# Patient Record
Sex: FEMALE | Race: WHITE | Employment: OTHER | URBAN - METROPOLITAN AREA
[De-identification: names, ages, dates, MRNs, and addresses within clinical notes are randomized per-mention and may not be internally consistent; named-entity substitution may affect disease eponyms.]

---

## 2017-01-05 ENCOUNTER — OFFICE VISIT (OUTPATIENT)
Dept: ORTHOPEDIC SURGERY | Age: 77
End: 2017-01-05

## 2017-01-05 VITALS — BODY MASS INDEX: 21.67 KG/M2 | HEIGHT: 65 IN | WEIGHT: 130.07 LBS

## 2017-01-05 DIAGNOSIS — S52.531A CLOSED COLLES' FRACTURE OF RIGHT RADIUS, INITIAL ENCOUNTER: Primary | ICD-10-CM

## 2017-01-05 DIAGNOSIS — S52.532A CLOSED COLLES' FRACTURE OF LEFT RADIUS, INITIAL ENCOUNTER: ICD-10-CM

## 2017-01-05 PROCEDURE — 73110 X-RAY EXAM OF WRIST: CPT | Performed by: ORTHOPAEDIC SURGERY

## 2017-01-05 PROCEDURE — L3908 WHO COCK-UP NONMOLDE PRE OTS: HCPCS | Performed by: ORTHOPAEDIC SURGERY

## 2017-01-05 PROCEDURE — 99024 POSTOP FOLLOW-UP VISIT: CPT | Performed by: ORTHOPAEDIC SURGERY

## 2017-01-11 ENCOUNTER — TELEPHONE (OUTPATIENT)
Dept: INTERNAL MEDICINE | Age: 77
End: 2017-01-11

## 2017-01-11 RX ORDER — OXYCODONE AND ACETAMINOPHEN 10; 325 MG/1; MG/1
1 TABLET ORAL EVERY 6 HOURS PRN
Qty: 120 TABLET | Refills: 0 | Status: SHIPPED | OUTPATIENT
Start: 2017-01-11 | End: 2017-02-09 | Stop reason: SDUPTHER

## 2017-01-23 ENCOUNTER — OFFICE VISIT (OUTPATIENT)
Dept: ORTHOPEDIC SURGERY | Age: 77
End: 2017-01-23

## 2017-01-23 VITALS — BODY MASS INDEX: 21.67 KG/M2 | WEIGHT: 130.07 LBS | HEIGHT: 65 IN

## 2017-01-23 DIAGNOSIS — S52.532A CLOSED COLLES' FRACTURE OF LEFT RADIUS, INITIAL ENCOUNTER: ICD-10-CM

## 2017-01-23 DIAGNOSIS — S52.531A CLOSED COLLES' FRACTURE OF RIGHT RADIUS, INITIAL ENCOUNTER: Primary | ICD-10-CM

## 2017-01-23 PROCEDURE — 73110 X-RAY EXAM OF WRIST: CPT | Performed by: ORTHOPAEDIC SURGERY

## 2017-01-23 PROCEDURE — 99024 POSTOP FOLLOW-UP VISIT: CPT | Performed by: ORTHOPAEDIC SURGERY

## 2017-01-23 PROCEDURE — L3908 WHO COCK-UP NONMOLDE PRE OTS: HCPCS | Performed by: ORTHOPAEDIC SURGERY

## 2017-01-27 ENCOUNTER — HOSPITAL ENCOUNTER (OUTPATIENT)
Dept: OCCUPATIONAL THERAPY | Age: 77
Discharge: HOME | End: 2017-01-31
Admitting: ORTHOPAEDIC SURGERY

## 2017-02-03 ENCOUNTER — HOSPITAL ENCOUNTER (OUTPATIENT)
Dept: OCCUPATIONAL THERAPY | Age: 77
Discharge: HOME | End: 2017-02-03
Admitting: ORTHOPAEDIC SURGERY

## 2017-02-09 ENCOUNTER — TELEPHONE (OUTPATIENT)
Dept: INTERNAL MEDICINE | Age: 77
End: 2017-02-09

## 2017-02-09 RX ORDER — OXYCODONE AND ACETAMINOPHEN 10; 325 MG/1; MG/1
1 TABLET ORAL EVERY 6 HOURS PRN
Qty: 120 TABLET | Refills: 0 | Status: SHIPPED | OUTPATIENT
Start: 2017-02-09 | End: 2017-04-03 | Stop reason: SDUPTHER

## 2017-02-13 ENCOUNTER — OFFICE VISIT (OUTPATIENT)
Dept: ORTHOPEDIC SURGERY | Age: 77
End: 2017-02-13

## 2017-02-13 ENCOUNTER — TELEPHONE (OUTPATIENT)
Dept: INTERNAL MEDICINE | Age: 77
End: 2017-02-13

## 2017-02-13 DIAGNOSIS — F32.A ANXIETY AND DEPRESSION: ICD-10-CM

## 2017-02-13 DIAGNOSIS — S52.532A CLOSED COLLES' FRACTURE OF LEFT RADIUS, INITIAL ENCOUNTER: ICD-10-CM

## 2017-02-13 DIAGNOSIS — S52.531A CLOSED COLLES' FRACTURE OF RIGHT RADIUS, INITIAL ENCOUNTER: Primary | ICD-10-CM

## 2017-02-13 DIAGNOSIS — F41.9 ANXIETY AND DEPRESSION: ICD-10-CM

## 2017-02-13 PROCEDURE — 73110 X-RAY EXAM OF WRIST: CPT | Performed by: ORTHOPAEDIC SURGERY

## 2017-02-13 PROCEDURE — 99024 POSTOP FOLLOW-UP VISIT: CPT | Performed by: ORTHOPAEDIC SURGERY

## 2017-02-13 RX ORDER — ALPRAZOLAM 0.5 MG/1
TABLET ORAL
Qty: 90 TABLET | Refills: 0 | Status: SHIPPED | OUTPATIENT
Start: 2017-02-13 | End: 2017-04-14 | Stop reason: SDUPTHER

## 2017-03-13 ENCOUNTER — OFFICE VISIT (OUTPATIENT)
Dept: ORTHOPEDIC SURGERY | Age: 77
End: 2017-03-13

## 2017-03-13 VITALS
DIASTOLIC BLOOD PRESSURE: 68 MMHG | WEIGHT: 130.07 LBS | HEIGHT: 65 IN | BODY MASS INDEX: 21.67 KG/M2 | SYSTOLIC BLOOD PRESSURE: 145 MMHG | HEART RATE: 71 BPM

## 2017-03-13 DIAGNOSIS — S52.531A CLOSED COLLES' FRACTURE OF RIGHT RADIUS, INITIAL ENCOUNTER: Primary | ICD-10-CM

## 2017-03-13 DIAGNOSIS — S52.532A CLOSED COLLES' FRACTURE OF LEFT RADIUS, INITIAL ENCOUNTER: ICD-10-CM

## 2017-03-13 DIAGNOSIS — G56.01 CARPAL TUNNEL SYNDROME ON RIGHT: ICD-10-CM

## 2017-03-13 PROCEDURE — 73110 X-RAY EXAM OF WRIST: CPT | Performed by: ORTHOPAEDIC SURGERY

## 2017-03-13 PROCEDURE — 99024 POSTOP FOLLOW-UP VISIT: CPT | Performed by: ORTHOPAEDIC SURGERY

## 2017-03-13 PROCEDURE — 20526 THER INJECTION CARP TUNNEL: CPT | Performed by: ORTHOPAEDIC SURGERY

## 2017-03-22 ENCOUNTER — TELEPHONE (OUTPATIENT)
Dept: INTERNAL MEDICINE | Age: 77
End: 2017-03-22

## 2017-03-22 RX ORDER — ALPRAZOLAM 0.5 MG/1
0.5 TABLET ORAL 3 TIMES DAILY PRN
Qty: 21 TABLET | Refills: 0 | Status: SHIPPED | OUTPATIENT
Start: 2017-03-22 | End: 2017-03-29

## 2017-03-22 RX ORDER — OXYCODONE AND ACETAMINOPHEN 10; 325 MG/1; MG/1
1 TABLET ORAL EVERY 6 HOURS PRN
Qty: 28 TABLET | Refills: 0 | Status: SHIPPED | OUTPATIENT
Start: 2017-03-22 | End: 2017-03-29

## 2017-04-03 ENCOUNTER — OFFICE VISIT (OUTPATIENT)
Dept: INTERNAL MEDICINE | Age: 77
End: 2017-04-03

## 2017-04-03 VITALS
BODY MASS INDEX: 21.66 KG/M2 | HEIGHT: 65 IN | HEART RATE: 72 BPM | SYSTOLIC BLOOD PRESSURE: 148 MMHG | DIASTOLIC BLOOD PRESSURE: 80 MMHG | WEIGHT: 130 LBS

## 2017-04-03 DIAGNOSIS — E78.5 HYPERLIPIDEMIA, UNSPECIFIED HYPERLIPIDEMIA TYPE: ICD-10-CM

## 2017-04-03 DIAGNOSIS — Z00.00 PREVENTATIVE HEALTH CARE: ICD-10-CM

## 2017-04-03 DIAGNOSIS — F41.9 ANXIETY AND DEPRESSION: ICD-10-CM

## 2017-04-03 DIAGNOSIS — G89.29 CHRONIC BILATERAL LOW BACK PAIN WITHOUT SCIATICA: ICD-10-CM

## 2017-04-03 DIAGNOSIS — S52.532D CLOSED COLLES' FRACTURE OF LEFT RADIUS WITH ROUTINE HEALING, SUBSEQUENT ENCOUNTER: ICD-10-CM

## 2017-04-03 DIAGNOSIS — G89.29 CHRONIC RIGHT FLANK PAIN: ICD-10-CM

## 2017-04-03 DIAGNOSIS — I10 ESSENTIAL HYPERTENSION: ICD-10-CM

## 2017-04-03 DIAGNOSIS — F10.10 ALCOHOL ABUSE: ICD-10-CM

## 2017-04-03 DIAGNOSIS — F10.10 ALCOHOL ABUSE, CONTINUOUS: ICD-10-CM

## 2017-04-03 DIAGNOSIS — Z00.00 PREVENTATIVE HEALTH CARE: Primary | ICD-10-CM

## 2017-04-03 DIAGNOSIS — M54.50 CHRONIC BILATERAL LOW BACK PAIN WITHOUT SCIATICA: ICD-10-CM

## 2017-04-03 DIAGNOSIS — N39.0 SEPSIS SECONDARY TO UTI (HCC): ICD-10-CM

## 2017-04-03 DIAGNOSIS — F32.A ANXIETY AND DEPRESSION: ICD-10-CM

## 2017-04-03 DIAGNOSIS — Z87.891 FORMER CIGARETTE SMOKER: ICD-10-CM

## 2017-04-03 DIAGNOSIS — S52.531D CLOSED COLLES' FRACTURE OF RIGHT RADIUS WITH ROUTINE HEALING, SUBSEQUENT ENCOUNTER: ICD-10-CM

## 2017-04-03 DIAGNOSIS — A41.9 SEPSIS SECONDARY TO UTI (HCC): ICD-10-CM

## 2017-04-03 DIAGNOSIS — R10.9 CHRONIC RIGHT FLANK PAIN: ICD-10-CM

## 2017-04-03 DIAGNOSIS — J32.9 SINUSITIS, UNSPECIFIED CHRONICITY, UNSPECIFIED LOCATION: ICD-10-CM

## 2017-04-03 LAB
BASOPHILS ABSOLUTE: 0.1 K/UL (ref 0–0.2)
BASOPHILS RELATIVE PERCENT: 1 %
BILIRUBIN URINE: NEGATIVE
BLOOD, URINE: NEGATIVE
CLARITY: CLEAR
COLOR: YELLOW
EOSINOPHILS ABSOLUTE: 0 K/UL (ref 0–0.6)
EOSINOPHILS RELATIVE PERCENT: 0.6 %
GLUCOSE URINE: NEGATIVE MG/DL
HCT VFR BLD CALC: 46.7 % (ref 36–48)
HEMOGLOBIN: 15.3 G/DL (ref 12–16)
KETONES, URINE: NEGATIVE MG/DL
LEUKOCYTE ESTERASE, URINE: NEGATIVE
LYMPHOCYTES ABSOLUTE: 1.1 K/UL (ref 1–5.1)
LYMPHOCYTES RELATIVE PERCENT: 12.9 %
MCH RBC QN AUTO: 31.8 PG (ref 26–34)
MCHC RBC AUTO-ENTMCNC: 32.7 G/DL (ref 31–36)
MCV RBC AUTO: 97.1 FL (ref 80–100)
MICROSCOPIC EXAMINATION: NORMAL
MONOCYTES ABSOLUTE: 0.6 K/UL (ref 0–1.3)
MONOCYTES RELATIVE PERCENT: 7.6 %
NEUTROPHILS ABSOLUTE: 6.6 K/UL (ref 1.7–7.7)
NEUTROPHILS RELATIVE PERCENT: 77.9 %
NITRITE, URINE: NEGATIVE
PDW BLD-RTO: 15.5 % (ref 12.4–15.4)
PH UA: 5.5
PLATELET # BLD: 246 K/UL (ref 135–450)
PMV BLD AUTO: 9 FL (ref 5–10.5)
PROTEIN UA: NEGATIVE MG/DL
RBC # BLD: 4.81 M/UL (ref 4–5.2)
SPECIFIC GRAVITY UA: 1.02
URINE TYPE: NORMAL
UROBILINOGEN, URINE: 0.2 E.U./DL
WBC # BLD: 8.5 K/UL (ref 4–11)

## 2017-04-03 PROCEDURE — G0439 PPPS, SUBSEQ VISIT: HCPCS | Performed by: INTERNAL MEDICINE

## 2017-04-03 PROCEDURE — 93000 ELECTROCARDIOGRAM COMPLETE: CPT | Performed by: INTERNAL MEDICINE

## 2017-04-03 RX ORDER — OXYCODONE AND ACETAMINOPHEN 10; 325 MG/1; MG/1
1 TABLET ORAL EVERY 6 HOURS PRN
Qty: 120 TABLET | Refills: 0 | Status: SHIPPED | OUTPATIENT
Start: 2017-04-03 | End: 2017-05-10 | Stop reason: SDUPTHER

## 2017-04-03 RX ORDER — AZITHROMYCIN 500 MG/1
TABLET, FILM COATED ORAL
Qty: 3 TABLET | Refills: 0 | Status: SHIPPED | OUTPATIENT
Start: 2017-04-03 | End: 2017-04-13

## 2017-04-03 ASSESSMENT — ENCOUNTER SYMPTOMS
RESPIRATORY NEGATIVE: 1
EYES NEGATIVE: 1
GASTROINTESTINAL NEGATIVE: 1
BACK PAIN: 1
SINUS PRESSURE: 1

## 2017-04-04 LAB
A/G RATIO: 1.3 (ref 1.1–2.2)
ALBUMIN SERPL-MCNC: 4.5 G/DL (ref 3.4–5)
ALP BLD-CCNC: 86 U/L (ref 40–129)
ALT SERPL-CCNC: 10 U/L (ref 10–40)
AMPHETAMINE SCREEN, URINE: ABNORMAL
ANION GAP SERPL CALCULATED.3IONS-SCNC: 20 MMOL/L (ref 3–16)
AST SERPL-CCNC: 14 U/L (ref 15–37)
BARBITURATE SCREEN URINE: ABNORMAL
BENZODIAZEPINE SCREEN, URINE: POSITIVE
BILIRUB SERPL-MCNC: 0.4 MG/DL (ref 0–1)
BUN BLDV-MCNC: 30 MG/DL (ref 7–20)
CALCIUM SERPL-MCNC: 9.9 MG/DL (ref 8.3–10.6)
CANNABINOID SCREEN URINE: ABNORMAL
CHLORIDE BLD-SCNC: 94 MMOL/L (ref 99–110)
CHOLESTEROL, TOTAL: 323 MG/DL (ref 0–199)
CO2: 21 MMOL/L (ref 21–32)
COCAINE METABOLITE SCREEN URINE: ABNORMAL
CREAT SERPL-MCNC: 1.1 MG/DL (ref 0.6–1.2)
GFR AFRICAN AMERICAN: 59
GFR NON-AFRICAN AMERICAN: 48
GLOBULIN: 3.4 G/DL
GLUCOSE BLD-MCNC: 90 MG/DL (ref 70–99)
HDLC SERPL-MCNC: 120 MG/DL (ref 40–60)
LDL CHOLESTEROL CALCULATED: ABNORMAL MG/DL
LDL CHOLESTEROL DIRECT: 155 MG/DL
Lab: ABNORMAL
METHADONE SCREEN, URINE: ABNORMAL
OPIATE SCREEN URINE: ABNORMAL
OXYCODONE URINE: POSITIVE
PH UA: 5.5
PHENCYCLIDINE SCREEN URINE: ABNORMAL
POTASSIUM SERPL-SCNC: 4.9 MMOL/L (ref 3.5–5.1)
PROPOXYPHENE SCREEN: ABNORMAL
SODIUM BLD-SCNC: 135 MMOL/L (ref 136–145)
TOTAL PROTEIN: 7.9 G/DL (ref 6.4–8.2)
TRIGL SERPL-MCNC: 364 MG/DL (ref 0–150)
TSH REFLEX FT4: 1.11 UIU/ML (ref 0.27–4.2)
VLDLC SERPL CALC-MCNC: ABNORMAL MG/DL

## 2017-04-05 ENCOUNTER — TELEPHONE (OUTPATIENT)
Dept: INTERNAL MEDICINE | Age: 77
End: 2017-04-05

## 2017-04-05 DIAGNOSIS — I10 ESSENTIAL HYPERTENSION: ICD-10-CM

## 2017-04-05 DIAGNOSIS — E78.49 OTHER HYPERLIPIDEMIA: Primary | ICD-10-CM

## 2017-04-05 RX ORDER — ATORVASTATIN CALCIUM 10 MG/1
10 TABLET, FILM COATED ORAL DAILY
Qty: 30 TABLET | Refills: 11 | Status: SHIPPED | OUTPATIENT
Start: 2017-04-05 | End: 2017-06-21 | Stop reason: CLARIF

## 2017-04-14 ENCOUNTER — TELEPHONE (OUTPATIENT)
Dept: INTERNAL MEDICINE | Age: 77
End: 2017-04-14

## 2017-04-14 DIAGNOSIS — F32.A ANXIETY AND DEPRESSION: ICD-10-CM

## 2017-04-14 DIAGNOSIS — F41.9 ANXIETY AND DEPRESSION: ICD-10-CM

## 2017-04-14 RX ORDER — ALPRAZOLAM 0.5 MG/1
TABLET ORAL
Qty: 90 TABLET | Refills: 0 | OUTPATIENT
Start: 2017-04-14 | End: 2017-05-10 | Stop reason: SDUPTHER

## 2017-05-08 ENCOUNTER — OFFICE VISIT (OUTPATIENT)
Dept: ORTHOPEDIC SURGERY | Age: 77
End: 2017-05-08

## 2017-05-08 VITALS — HEIGHT: 65 IN | BODY MASS INDEX: 21.67 KG/M2 | WEIGHT: 130.07 LBS

## 2017-05-08 DIAGNOSIS — S52.532A CLOSED COLLES' FRACTURE OF LEFT RADIUS, INITIAL ENCOUNTER: Primary | ICD-10-CM

## 2017-05-08 PROCEDURE — 3014F SCREEN MAMMO DOC REV: CPT | Performed by: ORTHOPAEDIC SURGERY

## 2017-05-08 PROCEDURE — 4040F PNEUMOC VAC/ADMIN/RCVD: CPT | Performed by: ORTHOPAEDIC SURGERY

## 2017-05-08 PROCEDURE — 1123F ACP DISCUSS/DSCN MKR DOCD: CPT | Performed by: ORTHOPAEDIC SURGERY

## 2017-05-08 PROCEDURE — G8400 PT W/DXA NO RESULTS DOC: HCPCS | Performed by: ORTHOPAEDIC SURGERY

## 2017-05-08 PROCEDURE — 1036F TOBACCO NON-USER: CPT | Performed by: ORTHOPAEDIC SURGERY

## 2017-05-08 PROCEDURE — 99213 OFFICE O/P EST LOW 20 MIN: CPT | Performed by: ORTHOPAEDIC SURGERY

## 2017-05-08 PROCEDURE — 1090F PRES/ABSN URINE INCON ASSESS: CPT | Performed by: ORTHOPAEDIC SURGERY

## 2017-05-08 PROCEDURE — G8427 DOCREV CUR MEDS BY ELIG CLIN: HCPCS | Performed by: ORTHOPAEDIC SURGERY

## 2017-05-08 PROCEDURE — G8419 CALC BMI OUT NRM PARAM NOF/U: HCPCS | Performed by: ORTHOPAEDIC SURGERY

## 2017-05-08 PROCEDURE — 3017F COLORECTAL CA SCREEN DOC REV: CPT | Performed by: ORTHOPAEDIC SURGERY

## 2017-05-08 PROCEDURE — 73110 X-RAY EXAM OF WRIST: CPT | Performed by: ORTHOPAEDIC SURGERY

## 2017-05-10 ENCOUNTER — TELEPHONE (OUTPATIENT)
Dept: INTERNAL MEDICINE | Age: 77
End: 2017-05-10

## 2017-05-10 DIAGNOSIS — F41.9 ANXIETY AND DEPRESSION: ICD-10-CM

## 2017-05-10 DIAGNOSIS — F32.A ANXIETY AND DEPRESSION: ICD-10-CM

## 2017-05-10 RX ORDER — OXYCODONE AND ACETAMINOPHEN 10; 325 MG/1; MG/1
1 TABLET ORAL EVERY 6 HOURS PRN
Qty: 120 TABLET | Refills: 0 | Status: SHIPPED | OUTPATIENT
Start: 2017-05-10 | End: 2017-06-06 | Stop reason: SDUPTHER

## 2017-05-10 RX ORDER — ALPRAZOLAM 0.5 MG/1
TABLET ORAL
Qty: 90 TABLET | Refills: 0 | Status: SHIPPED | OUTPATIENT
Start: 2017-05-10 | End: 2017-06-26 | Stop reason: SDUPTHER

## 2017-06-06 ENCOUNTER — OFFICE VISIT (OUTPATIENT)
Dept: INTERNAL MEDICINE | Age: 77
End: 2017-06-06

## 2017-06-06 ENCOUNTER — TELEPHONE (OUTPATIENT)
Dept: PAIN MANAGEMENT | Age: 77
End: 2017-06-06

## 2017-06-06 VITALS
SYSTOLIC BLOOD PRESSURE: 138 MMHG | BODY MASS INDEX: 21.85 KG/M2 | WEIGHT: 128 LBS | HEART RATE: 64 BPM | HEIGHT: 64 IN | DIASTOLIC BLOOD PRESSURE: 82 MMHG

## 2017-06-06 DIAGNOSIS — N28.89 RENAL MASS, RIGHT: ICD-10-CM

## 2017-06-06 DIAGNOSIS — R93.5 ABNORMAL CT OF THE ABDOMEN: Primary | ICD-10-CM

## 2017-06-06 DIAGNOSIS — M25.552 LEFT HIP PAIN: ICD-10-CM

## 2017-06-06 DIAGNOSIS — R10.9 CHRONIC RIGHT FLANK PAIN: ICD-10-CM

## 2017-06-06 DIAGNOSIS — M54.50 CHRONIC BILATERAL LOW BACK PAIN WITHOUT SCIATICA: ICD-10-CM

## 2017-06-06 DIAGNOSIS — F10.10 ALCOHOL ABUSE, CONTINUOUS: ICD-10-CM

## 2017-06-06 DIAGNOSIS — G89.29 CHRONIC RIGHT FLANK PAIN: ICD-10-CM

## 2017-06-06 DIAGNOSIS — E78.49 OTHER HYPERLIPIDEMIA: ICD-10-CM

## 2017-06-06 DIAGNOSIS — G89.29 CHRONIC BILATERAL LOW BACK PAIN WITHOUT SCIATICA: ICD-10-CM

## 2017-06-06 DIAGNOSIS — I10 ESSENTIAL HYPERTENSION: ICD-10-CM

## 2017-06-06 PROBLEM — J32.9 SINUSITIS: Status: RESOLVED | Noted: 2017-04-03 | Resolved: 2017-06-06

## 2017-06-06 PROCEDURE — 3017F COLORECTAL CA SCREEN DOC REV: CPT | Performed by: INTERNAL MEDICINE

## 2017-06-06 PROCEDURE — G8427 DOCREV CUR MEDS BY ELIG CLIN: HCPCS | Performed by: INTERNAL MEDICINE

## 2017-06-06 PROCEDURE — 1036F TOBACCO NON-USER: CPT | Performed by: INTERNAL MEDICINE

## 2017-06-06 PROCEDURE — 1123F ACP DISCUSS/DSCN MKR DOCD: CPT | Performed by: INTERNAL MEDICINE

## 2017-06-06 PROCEDURE — G8400 PT W/DXA NO RESULTS DOC: HCPCS | Performed by: INTERNAL MEDICINE

## 2017-06-06 PROCEDURE — G8419 CALC BMI OUT NRM PARAM NOF/U: HCPCS | Performed by: INTERNAL MEDICINE

## 2017-06-06 PROCEDURE — 1090F PRES/ABSN URINE INCON ASSESS: CPT | Performed by: INTERNAL MEDICINE

## 2017-06-06 PROCEDURE — 4040F PNEUMOC VAC/ADMIN/RCVD: CPT | Performed by: INTERNAL MEDICINE

## 2017-06-06 PROCEDURE — 99214 OFFICE O/P EST MOD 30 MIN: CPT | Performed by: INTERNAL MEDICINE

## 2017-06-06 RX ORDER — OXYCODONE AND ACETAMINOPHEN 10; 325 MG/1; MG/1
1 TABLET ORAL EVERY 6 HOURS PRN
Qty: 120 TABLET | Refills: 0 | Status: SHIPPED | OUTPATIENT
Start: 2017-06-06 | End: 2017-06-26 | Stop reason: SDUPTHER

## 2017-06-06 ASSESSMENT — PATIENT HEALTH QUESTIONNAIRE - PHQ9
1. LITTLE INTEREST OR PLEASURE IN DOING THINGS: 0
SUM OF ALL RESPONSES TO PHQ QUESTIONS 1-9: 0
2. FEELING DOWN, DEPRESSED OR HOPELESS: 0
SUM OF ALL RESPONSES TO PHQ9 QUESTIONS 1 & 2: 0

## 2017-06-06 ASSESSMENT — ENCOUNTER SYMPTOMS
RESPIRATORY NEGATIVE: 1
BACK PAIN: 1
TROUBLE SWALLOWING: 0

## 2017-06-07 ENCOUNTER — TELEPHONE (OUTPATIENT)
Dept: PAIN MANAGEMENT | Age: 77
End: 2017-06-07

## 2017-06-07 LAB
A/G RATIO: 1.6 (ref 1.1–2.2)
ALBUMIN SERPL-MCNC: 4.4 G/DL (ref 3.4–5)
ALP BLD-CCNC: 65 U/L (ref 40–129)
ALT SERPL-CCNC: 9 U/L (ref 10–40)
ANION GAP SERPL CALCULATED.3IONS-SCNC: 13 MMOL/L (ref 3–16)
AST SERPL-CCNC: 13 U/L (ref 15–37)
BASOPHILS ABSOLUTE: 0 K/UL (ref 0–0.2)
BASOPHILS RELATIVE PERCENT: 0.6 %
BILIRUB SERPL-MCNC: 0.8 MG/DL (ref 0–1)
BILIRUBIN URINE: NEGATIVE
BLOOD, URINE: NEGATIVE
BUN BLDV-MCNC: 24 MG/DL (ref 7–20)
CALCIUM SERPL-MCNC: 9.2 MG/DL (ref 8.3–10.6)
CHLORIDE BLD-SCNC: 100 MMOL/L (ref 99–110)
CHOLESTEROL, TOTAL: 257 MG/DL (ref 0–199)
CLARITY: CLEAR
CO2: 25 MMOL/L (ref 21–32)
COLOR: YELLOW
CREAT SERPL-MCNC: 1.2 MG/DL (ref 0.6–1.2)
EOSINOPHILS ABSOLUTE: 0.2 K/UL (ref 0–0.6)
EOSINOPHILS RELATIVE PERCENT: 4 %
GFR AFRICAN AMERICAN: 53
GFR NON-AFRICAN AMERICAN: 44
GLOBULIN: 2.7 G/DL
GLUCOSE BLD-MCNC: 84 MG/DL (ref 70–99)
GLUCOSE URINE: NEGATIVE MG/DL
HCT VFR BLD CALC: 40.9 % (ref 36–48)
HDLC SERPL-MCNC: 92 MG/DL (ref 40–60)
HEMOGLOBIN: 13.4 G/DL (ref 12–16)
KETONES, URINE: NEGATIVE MG/DL
LDL CHOLESTEROL CALCULATED: 123 MG/DL
LEUKOCYTE ESTERASE, URINE: NEGATIVE
LYMPHOCYTES ABSOLUTE: 1.2 K/UL (ref 1–5.1)
LYMPHOCYTES RELATIVE PERCENT: 20.3 %
MCH RBC QN AUTO: 32.6 PG (ref 26–34)
MCHC RBC AUTO-ENTMCNC: 32.7 G/DL (ref 31–36)
MCV RBC AUTO: 99.7 FL (ref 80–100)
MICROSCOPIC EXAMINATION: NORMAL
MONOCYTES ABSOLUTE: 0.6 K/UL (ref 0–1.3)
MONOCYTES RELATIVE PERCENT: 10.8 %
NEUTROPHILS ABSOLUTE: 3.8 K/UL (ref 1.7–7.7)
NEUTROPHILS RELATIVE PERCENT: 64.3 %
NITRITE, URINE: NEGATIVE
PDW BLD-RTO: 15.4 % (ref 12.4–15.4)
PH UA: 6.5
PLATELET # BLD: 294 K/UL (ref 135–450)
PMV BLD AUTO: 8.9 FL (ref 5–10.5)
POTASSIUM SERPL-SCNC: 5.7 MMOL/L (ref 3.5–5.1)
PROTEIN UA: NEGATIVE MG/DL
RBC # BLD: 4.1 M/UL (ref 4–5.2)
SODIUM BLD-SCNC: 138 MMOL/L (ref 136–145)
SPECIFIC GRAVITY UA: 1.01
TOTAL PROTEIN: 7.1 G/DL (ref 6.4–8.2)
TRIGL SERPL-MCNC: 211 MG/DL (ref 0–150)
URINE REFLEX TO CULTURE: NORMAL
URINE TYPE: NORMAL
UROBILINOGEN, URINE: 0.2 E.U./DL
VLDLC SERPL CALC-MCNC: 42 MG/DL
WBC # BLD: 6 K/UL (ref 4–11)

## 2017-06-08 ENCOUNTER — TELEPHONE (OUTPATIENT)
Dept: INTERNAL MEDICINE | Age: 77
End: 2017-06-08

## 2017-06-08 DIAGNOSIS — E87.5 SERUM POTASSIUM ELEVATED: Primary | ICD-10-CM

## 2017-06-13 DIAGNOSIS — E87.5 SERUM POTASSIUM ELEVATED: ICD-10-CM

## 2017-06-13 LAB
A/G RATIO: 1.7 (ref 1.1–2.2)
ALBUMIN SERPL-MCNC: 4.2 G/DL (ref 3.4–5)
ALP BLD-CCNC: 56 U/L (ref 40–129)
ALT SERPL-CCNC: 10 U/L (ref 10–40)
ANION GAP SERPL CALCULATED.3IONS-SCNC: 13 MMOL/L (ref 3–16)
AST SERPL-CCNC: 13 U/L (ref 15–37)
BILIRUB SERPL-MCNC: 0.5 MG/DL (ref 0–1)
BUN BLDV-MCNC: 29 MG/DL (ref 7–20)
CALCIUM SERPL-MCNC: 8.9 MG/DL (ref 8.3–10.6)
CHLORIDE BLD-SCNC: 100 MMOL/L (ref 99–110)
CO2: 25 MMOL/L (ref 21–32)
CREAT SERPL-MCNC: 1.5 MG/DL (ref 0.6–1.2)
GFR AFRICAN AMERICAN: 41
GFR NON-AFRICAN AMERICAN: 34
GLOBULIN: 2.5 G/DL
GLUCOSE BLD-MCNC: 122 MG/DL (ref 70–99)
POTASSIUM SERPL-SCNC: 4.7 MMOL/L (ref 3.5–5.1)
SODIUM BLD-SCNC: 138 MMOL/L (ref 136–145)
TOTAL PROTEIN: 6.7 G/DL (ref 6.4–8.2)

## 2017-06-19 ENCOUNTER — HOSPITAL ENCOUNTER (OUTPATIENT)
Dept: CT IMAGING | Age: 77
Discharge: HOME | End: 2017-06-19
Attending: INTERNAL MEDICINE | Admitting: INTERNAL MEDICINE

## 2017-06-19 DIAGNOSIS — R93.5 ABNORMAL FINDINGS ON DIAGNOSTIC IMAGING OF OTHER ABDOMINAL REGIONS, INCLUDING RETROPERITONEUM: ICD-10-CM

## 2017-06-19 DIAGNOSIS — R93.5 ABNORMAL CT OF THE ABDOMEN: ICD-10-CM

## 2017-06-19 DIAGNOSIS — M25.552 LEFT HIP PAIN: ICD-10-CM

## 2017-06-19 LAB
GFR AFRICAN AMERICAN: 48
GFR NON-AFRICAN AMERICAN: 40
PERFORMED ON: ABNORMAL
POC CREATININE: 1.3 MG/DL (ref 0.6–1.2)
POC SAMPLE TYPE: ABNORMAL

## 2017-06-21 ENCOUNTER — OFFICE VISIT (OUTPATIENT)
Dept: PAIN MANAGEMENT | Age: 77
End: 2017-06-21

## 2017-06-21 VITALS
HEIGHT: 65 IN | DIASTOLIC BLOOD PRESSURE: 59 MMHG | HEART RATE: 68 BPM | SYSTOLIC BLOOD PRESSURE: 120 MMHG | BODY MASS INDEX: 22 KG/M2 | WEIGHT: 132.06 LBS

## 2017-06-21 DIAGNOSIS — M41.25 OTHER IDIOPATHIC SCOLIOSIS, THORACOLUMBAR REGION: ICD-10-CM

## 2017-06-21 DIAGNOSIS — M43.17 SPONDYLOLISTHESIS OF LUMBOSACRAL REGION: Primary | ICD-10-CM

## 2017-06-21 DIAGNOSIS — F11.90 CHRONIC, CONTINUOUS USE OF OPIOIDS: ICD-10-CM

## 2017-06-21 DIAGNOSIS — G89.4 CHRONIC PAIN SYNDROME: ICD-10-CM

## 2017-06-21 PROCEDURE — G8420 CALC BMI NORM PARAMETERS: HCPCS | Performed by: ANESTHESIOLOGY

## 2017-06-21 PROCEDURE — 3017F COLORECTAL CA SCREEN DOC REV: CPT | Performed by: ANESTHESIOLOGY

## 2017-06-21 PROCEDURE — 1090F PRES/ABSN URINE INCON ASSESS: CPT | Performed by: ANESTHESIOLOGY

## 2017-06-21 PROCEDURE — 99204 OFFICE O/P NEW MOD 45 MIN: CPT | Performed by: ANESTHESIOLOGY

## 2017-06-21 PROCEDURE — G8427 DOCREV CUR MEDS BY ELIG CLIN: HCPCS | Performed by: ANESTHESIOLOGY

## 2017-06-21 PROCEDURE — G8400 PT W/DXA NO RESULTS DOC: HCPCS | Performed by: ANESTHESIOLOGY

## 2017-06-21 PROCEDURE — 4040F PNEUMOC VAC/ADMIN/RCVD: CPT | Performed by: ANESTHESIOLOGY

## 2017-06-21 PROCEDURE — 1123F ACP DISCUSS/DSCN MKR DOCD: CPT | Performed by: ANESTHESIOLOGY

## 2017-06-21 PROCEDURE — G0444 DEPRESSION SCREEN ANNUAL: HCPCS | Performed by: ANESTHESIOLOGY

## 2017-06-21 PROCEDURE — 1036F TOBACCO NON-USER: CPT | Performed by: ANESTHESIOLOGY

## 2017-06-21 ASSESSMENT — PATIENT HEALTH QUESTIONNAIRE - PHQ9
3. TROUBLE FALLING OR STAYING ASLEEP: 1
5. POOR APPETITE OR OVEREATING: 2
4. FEELING TIRED OR HAVING LITTLE ENERGY: 2
1. LITTLE INTEREST OR PLEASURE IN DOING THINGS: 3
9. THOUGHTS THAT YOU WOULD BE BETTER OFF DEAD, OR OF HURTING YOURSELF: 0
2. FEELING DOWN, DEPRESSED OR HOPELESS: 2
6. FEELING BAD ABOUT YOURSELF - OR THAT YOU ARE A FAILURE OR HAVE LET YOURSELF OR YOUR FAMILY DOWN: 1
10. IF YOU CHECKED OFF ANY PROBLEMS, HOW DIFFICULT HAVE THESE PROBLEMS MADE IT FOR YOU TO DO YOUR WORK, TAKE CARE OF THINGS AT HOME, OR GET ALONG WITH OTHER PEOPLE: 1
8. MOVING OR SPEAKING SO SLOWLY THAT OTHER PEOPLE COULD HAVE NOTICED. OR THE OPPOSITE, BEING SO FIGETY OR RESTLESS THAT YOU HAVE BEEN MOVING AROUND A LOT MORE THAN USUAL: 0
SUM OF ALL RESPONSES TO PHQ9 QUESTIONS 1 & 2: 5
7. TROUBLE CONCENTRATING ON THINGS, SUCH AS READING THE NEWSPAPER OR WATCHING TELEVISION: 1
SUM OF ALL RESPONSES TO PHQ QUESTIONS 1-9: 12

## 2017-06-21 ASSESSMENT — ENCOUNTER SYMPTOMS
COUGH: 0
EYE PAIN: 0
ORTHOPNEA: 0
EYE REDNESS: 0
WHEEZING: 0
HEARTBURN: 0
BACK PAIN: 1
HEMOPTYSIS: 0
SHORTNESS OF BREATH: 0
ABDOMINAL PAIN: 0
EYE DISCHARGE: 0
NAUSEA: 0
VOMITING: 0
CONSTIPATION: 0

## 2017-06-25 LAB
6-ACETYLMORPHINE: NOT DETECTED
7-AMINOCLONAZEPAM: NOT DETECTED
ALPHA-OH-ALPRAZOLAM: PRESENT
ALPRAZOLAM: PRESENT
AMPHETAMINE: NOT DETECTED
BARBITURATES: NOT DETECTED
BENZOYLECGONINE: NOT DETECTED
BUPRENORPHINE: NOT DETECTED
CARISOPRODOL: NOT DETECTED
CLONAZEPAM: NOT DETECTED
CODEINE: NOT DETECTED
CREATININE URINE: 160.7 MG/DL (ref 20–400)
DIAZEPAM: NOT DETECTED
DRUGS EXPECTED: NORMAL
EER PAIN MGT DRUG PANEL, HIGH RES/EMIT U: NORMAL
ETHYL GLUCURONIDE: NOT DETECTED
FENTANYL: NOT DETECTED
HYDROCODONE: NOT DETECTED
HYDROMORPHONE: NOT DETECTED
LORAZEPAM: NOT DETECTED
MARIJUANA METABOLITE: NOT DETECTED
MDA: NOT DETECTED
MDEA: NOT DETECTED
MDMA URINE: NOT DETECTED
MEPERIDINE: NOT DETECTED
METHADONE: NOT DETECTED
METHAMPHETAMINE: NOT DETECTED
METHYLPHENIDATE: NOT DETECTED
MIDAZOLAM: NOT DETECTED
MORPHINE: NOT DETECTED
NORBUPRENORPHINE, FREE: NOT DETECTED
NORDIAZEPAM: NOT DETECTED
NORFENTANYL: NOT DETECTED
NORHYDROCODONE, URINE: NOT DETECTED
NOROXYCODONE: PRESENT
NOROXYMORPHONE, URINE: PRESENT
OXAZEPAM: NOT DETECTED
OXYCODONE: PRESENT
OXYMORPHONE: PRESENT
PAIN MANAGEMENT DRUG PANEL: NORMAL
PAIN MANAGEMENT DRUG PANEL: NORMAL
PCP: NOT DETECTED
PHENTERMINE: NOT DETECTED
PROPOXYPHENE: NOT DETECTED
TAPENTADOL, URINE: NOT DETECTED
TAPENTADOL-O-SULFATE, URINE: NOT DETECTED
TEMAZEPAM: NOT DETECTED
TRAMADOL: NOT DETECTED
ZOLPIDEM: NOT DETECTED

## 2017-06-26 ENCOUNTER — HOSPITAL ENCOUNTER (OUTPATIENT)
Dept: OTHER | Age: 77
Discharge: HOME | End: 2017-06-26

## 2017-06-26 ENCOUNTER — OFFICE VISIT (OUTPATIENT)
Dept: INTERNAL MEDICINE | Age: 77
End: 2017-06-26

## 2017-06-26 VITALS
WEIGHT: 132 LBS | HEIGHT: 65 IN | DIASTOLIC BLOOD PRESSURE: 80 MMHG | HEART RATE: 76 BPM | SYSTOLIC BLOOD PRESSURE: 136 MMHG | BODY MASS INDEX: 21.99 KG/M2

## 2017-06-26 DIAGNOSIS — M43.17 SPONDYLOLISTHESIS, LUMBOSACRAL REGION: ICD-10-CM

## 2017-06-26 DIAGNOSIS — N28.89 RENAL MASS, RIGHT: ICD-10-CM

## 2017-06-26 DIAGNOSIS — R93.5 ABNORMAL CT OF THE ABDOMEN: ICD-10-CM

## 2017-06-26 DIAGNOSIS — R39.11 URINARY HESITANCY: ICD-10-CM

## 2017-06-26 DIAGNOSIS — F41.9 ANXIETY AND DEPRESSION: ICD-10-CM

## 2017-06-26 DIAGNOSIS — F32.A ANXIETY AND DEPRESSION: ICD-10-CM

## 2017-06-26 DIAGNOSIS — G89.4 CHRONIC PAIN SYNDROME: ICD-10-CM

## 2017-06-26 PROCEDURE — 3017F COLORECTAL CA SCREEN DOC REV: CPT | Performed by: INTERNAL MEDICINE

## 2017-06-26 PROCEDURE — 1036F TOBACCO NON-USER: CPT | Performed by: INTERNAL MEDICINE

## 2017-06-26 PROCEDURE — 1090F PRES/ABSN URINE INCON ASSESS: CPT | Performed by: INTERNAL MEDICINE

## 2017-06-26 PROCEDURE — 1123F ACP DISCUSS/DSCN MKR DOCD: CPT | Performed by: INTERNAL MEDICINE

## 2017-06-26 PROCEDURE — G8427 DOCREV CUR MEDS BY ELIG CLIN: HCPCS | Performed by: INTERNAL MEDICINE

## 2017-06-26 PROCEDURE — G8420 CALC BMI NORM PARAMETERS: HCPCS | Performed by: INTERNAL MEDICINE

## 2017-06-26 PROCEDURE — 4040F PNEUMOC VAC/ADMIN/RCVD: CPT | Performed by: INTERNAL MEDICINE

## 2017-06-26 PROCEDURE — G8400 PT W/DXA NO RESULTS DOC: HCPCS | Performed by: INTERNAL MEDICINE

## 2017-06-26 PROCEDURE — 99214 OFFICE O/P EST MOD 30 MIN: CPT | Performed by: INTERNAL MEDICINE

## 2017-06-26 RX ORDER — ALPRAZOLAM 0.5 MG/1
TABLET ORAL
Qty: 90 TABLET | Refills: 0 | Status: SHIPPED | OUTPATIENT
Start: 2017-06-26 | End: 2017-09-27 | Stop reason: SDUPTHER

## 2017-06-26 RX ORDER — OXYCODONE AND ACETAMINOPHEN 10; 325 MG/1; MG/1
1 TABLET ORAL EVERY 6 HOURS PRN
Qty: 120 TABLET | Refills: 0 | Status: SHIPPED | OUTPATIENT
Start: 2017-06-26 | End: 2017-08-23 | Stop reason: DRUGHIGH

## 2017-06-26 ASSESSMENT — ENCOUNTER SYMPTOMS
BLOOD IN STOOL: 0
BACK PAIN: 1
SHORTNESS OF BREATH: 0
TROUBLE SWALLOWING: 0
RHINORRHEA: 1

## 2017-07-24 ENCOUNTER — TELEPHONE (OUTPATIENT)
Dept: INTERNAL MEDICINE | Age: 77
End: 2017-07-24

## 2017-08-07 ENCOUNTER — OFFICE VISIT (OUTPATIENT)
Dept: ORTHOPEDIC SURGERY | Age: 77
End: 2017-08-07

## 2017-08-07 VITALS — HEIGHT: 65 IN | WEIGHT: 132.06 LBS | BODY MASS INDEX: 22 KG/M2

## 2017-08-07 DIAGNOSIS — S52.531A CLOSED COLLES' FRACTURE OF RIGHT RADIUS, INITIAL ENCOUNTER: Primary | ICD-10-CM

## 2017-08-07 DIAGNOSIS — S52.532A CLOSED COLLES' FRACTURE OF LEFT RADIUS, INITIAL ENCOUNTER: ICD-10-CM

## 2017-08-07 PROCEDURE — 73110 X-RAY EXAM OF WRIST: CPT | Performed by: ORTHOPAEDIC SURGERY

## 2017-08-07 PROCEDURE — 1036F TOBACCO NON-USER: CPT | Performed by: ORTHOPAEDIC SURGERY

## 2017-08-07 PROCEDURE — 4040F PNEUMOC VAC/ADMIN/RCVD: CPT | Performed by: ORTHOPAEDIC SURGERY

## 2017-08-07 PROCEDURE — G8420 CALC BMI NORM PARAMETERS: HCPCS | Performed by: ORTHOPAEDIC SURGERY

## 2017-08-07 PROCEDURE — 1090F PRES/ABSN URINE INCON ASSESS: CPT | Performed by: ORTHOPAEDIC SURGERY

## 2017-08-07 PROCEDURE — 1123F ACP DISCUSS/DSCN MKR DOCD: CPT | Performed by: ORTHOPAEDIC SURGERY

## 2017-08-07 PROCEDURE — 3017F COLORECTAL CA SCREEN DOC REV: CPT | Performed by: ORTHOPAEDIC SURGERY

## 2017-08-07 PROCEDURE — G8400 PT W/DXA NO RESULTS DOC: HCPCS | Performed by: ORTHOPAEDIC SURGERY

## 2017-08-07 PROCEDURE — G8427 DOCREV CUR MEDS BY ELIG CLIN: HCPCS | Performed by: ORTHOPAEDIC SURGERY

## 2017-08-07 PROCEDURE — 99213 OFFICE O/P EST LOW 20 MIN: CPT | Performed by: ORTHOPAEDIC SURGERY

## 2017-08-09 ENCOUNTER — TELEPHONE (OUTPATIENT)
Dept: INTERNAL MEDICINE | Age: 77
End: 2017-08-09

## 2017-08-09 ENCOUNTER — OFFICE VISIT (OUTPATIENT)
Dept: INTERNAL MEDICINE | Age: 77
End: 2017-08-09

## 2017-08-09 VITALS
WEIGHT: 137 LBS | HEIGHT: 64 IN | SYSTOLIC BLOOD PRESSURE: 140 MMHG | DIASTOLIC BLOOD PRESSURE: 80 MMHG | HEART RATE: 64 BPM | BODY MASS INDEX: 23.39 KG/M2

## 2017-08-09 DIAGNOSIS — G89.4 CHRONIC PAIN SYNDROME: ICD-10-CM

## 2017-08-09 DIAGNOSIS — F41.9 ANXIETY AND DEPRESSION: ICD-10-CM

## 2017-08-09 DIAGNOSIS — F32.A ANXIETY AND DEPRESSION: ICD-10-CM

## 2017-08-09 PROCEDURE — 1123F ACP DISCUSS/DSCN MKR DOCD: CPT | Performed by: INTERNAL MEDICINE

## 2017-08-09 PROCEDURE — 1036F TOBACCO NON-USER: CPT | Performed by: INTERNAL MEDICINE

## 2017-08-09 PROCEDURE — G8400 PT W/DXA NO RESULTS DOC: HCPCS | Performed by: INTERNAL MEDICINE

## 2017-08-09 PROCEDURE — G8427 DOCREV CUR MEDS BY ELIG CLIN: HCPCS | Performed by: INTERNAL MEDICINE

## 2017-08-09 PROCEDURE — G8420 CALC BMI NORM PARAMETERS: HCPCS | Performed by: INTERNAL MEDICINE

## 2017-08-09 PROCEDURE — 99214 OFFICE O/P EST MOD 30 MIN: CPT | Performed by: INTERNAL MEDICINE

## 2017-08-09 PROCEDURE — 4040F PNEUMOC VAC/ADMIN/RCVD: CPT | Performed by: INTERNAL MEDICINE

## 2017-08-09 PROCEDURE — 3017F COLORECTAL CA SCREEN DOC REV: CPT | Performed by: INTERNAL MEDICINE

## 2017-08-09 PROCEDURE — 1090F PRES/ABSN URINE INCON ASSESS: CPT | Performed by: INTERNAL MEDICINE

## 2017-08-09 RX ORDER — FLUCONAZOLE 100 MG/1
100 TABLET ORAL DAILY
Qty: 7 TABLET | Refills: 0 | Status: SHIPPED | OUTPATIENT
Start: 2017-08-09 | End: 2018-02-16

## 2017-08-09 RX ORDER — VENLAFAXINE HYDROCHLORIDE 75 MG/1
75 CAPSULE, EXTENDED RELEASE ORAL DAILY
Qty: 30 CAPSULE | Refills: 5 | Status: SHIPPED | OUTPATIENT
Start: 2017-08-09 | End: 2018-02-09 | Stop reason: SDUPTHER

## 2017-08-09 ASSESSMENT — ENCOUNTER SYMPTOMS
BACK PAIN: 1
SHORTNESS OF BREATH: 0
ABDOMINAL PAIN: 0

## 2017-08-11 ENCOUNTER — TELEPHONE (OUTPATIENT)
Dept: PAIN MANAGEMENT | Age: 77
End: 2017-08-11

## 2017-08-11 DIAGNOSIS — M43.17 SPONDYLOLISTHESIS OF LUMBOSACRAL REGION: Primary | ICD-10-CM

## 2017-08-23 ENCOUNTER — OFFICE VISIT (OUTPATIENT)
Dept: PAIN MANAGEMENT | Age: 77
End: 2017-08-23

## 2017-08-23 VITALS
BODY MASS INDEX: 22.13 KG/M2 | SYSTOLIC BLOOD PRESSURE: 136 MMHG | HEART RATE: 81 BPM | DIASTOLIC BLOOD PRESSURE: 78 MMHG | HEIGHT: 65 IN | WEIGHT: 132.8 LBS

## 2017-08-23 DIAGNOSIS — M43.17 SPONDYLOLISTHESIS OF LUMBOSACRAL REGION: Primary | ICD-10-CM

## 2017-08-23 DIAGNOSIS — F11.90 CHRONIC, CONTINUOUS USE OF OPIOIDS: ICD-10-CM

## 2017-08-23 PROCEDURE — G8427 DOCREV CUR MEDS BY ELIG CLIN: HCPCS | Performed by: ANESTHESIOLOGY

## 2017-08-23 PROCEDURE — G8400 PT W/DXA NO RESULTS DOC: HCPCS | Performed by: ANESTHESIOLOGY

## 2017-08-23 PROCEDURE — 1090F PRES/ABSN URINE INCON ASSESS: CPT | Performed by: ANESTHESIOLOGY

## 2017-08-23 PROCEDURE — 3017F COLORECTAL CA SCREEN DOC REV: CPT | Performed by: ANESTHESIOLOGY

## 2017-08-23 PROCEDURE — G8420 CALC BMI NORM PARAMETERS: HCPCS | Performed by: ANESTHESIOLOGY

## 2017-08-23 PROCEDURE — 1123F ACP DISCUSS/DSCN MKR DOCD: CPT | Performed by: ANESTHESIOLOGY

## 2017-08-23 PROCEDURE — 99213 OFFICE O/P EST LOW 20 MIN: CPT | Performed by: ANESTHESIOLOGY

## 2017-08-23 PROCEDURE — 4040F PNEUMOC VAC/ADMIN/RCVD: CPT | Performed by: ANESTHESIOLOGY

## 2017-08-23 PROCEDURE — 1036F TOBACCO NON-USER: CPT | Performed by: ANESTHESIOLOGY

## 2017-08-23 RX ORDER — OXYCODONE AND ACETAMINOPHEN 10; 325 MG/1; MG/1
1 TABLET ORAL EVERY 8 HOURS PRN
Qty: 90 TABLET | Refills: 0 | Status: SHIPPED | OUTPATIENT
Start: 2017-08-23 | End: 2017-10-03 | Stop reason: SDUPTHER

## 2017-08-23 ASSESSMENT — ENCOUNTER SYMPTOMS
CONSTIPATION: 0
SHORTNESS OF BREATH: 0
WHEEZING: 0
HEMOPTYSIS: 0
COUGH: 0
VOMITING: 0
ABDOMINAL PAIN: 0
EYE PAIN: 0
ORTHOPNEA: 0
BACK PAIN: 0
HEARTBURN: 0
EYE DISCHARGE: 0
EYE REDNESS: 0
NAUSEA: 0

## 2017-08-28 ENCOUNTER — HOSPITAL ENCOUNTER (OUTPATIENT)
Dept: PHYSICAL THERAPY | Age: 77
Discharge: HOME | End: 2017-08-31
Admitting: ANESTHESIOLOGY

## 2017-08-30 ENCOUNTER — HOSPITAL ENCOUNTER (OUTPATIENT)
Dept: PHYSICAL THERAPY | Age: 77
Discharge: HOME | End: 2017-08-31

## 2017-09-06 ENCOUNTER — HOSPITAL ENCOUNTER (OUTPATIENT)
Dept: PHYSICAL THERAPY | Age: 77
Discharge: HOME | End: 2017-09-07

## 2017-09-08 ENCOUNTER — HOSPITAL ENCOUNTER (OUTPATIENT)
Dept: PHYSICAL THERAPY | Age: 77
Discharge: HOME | End: 2017-09-09

## 2017-09-13 ENCOUNTER — HOSPITAL ENCOUNTER (OUTPATIENT)
Dept: PHYSICAL THERAPY | Age: 77
Discharge: HOME | End: 2017-09-14

## 2017-09-22 ENCOUNTER — HOSPITAL ENCOUNTER (OUTPATIENT)
Dept: PHYSICAL THERAPY | Age: 77
Discharge: HOME | End: 2017-09-23

## 2017-09-27 ENCOUNTER — TELEPHONE (OUTPATIENT)
Dept: INTERNAL MEDICINE | Age: 77
End: 2017-09-27

## 2017-09-27 DIAGNOSIS — F41.9 ANXIETY AND DEPRESSION: ICD-10-CM

## 2017-09-27 DIAGNOSIS — F32.A ANXIETY AND DEPRESSION: ICD-10-CM

## 2017-09-27 RX ORDER — ALPRAZOLAM 0.5 MG/1
TABLET ORAL
Qty: 90 TABLET | Refills: 0 | OUTPATIENT
Start: 2017-09-27 | End: 2017-11-10 | Stop reason: SDUPTHER

## 2017-10-03 ENCOUNTER — HOSPITAL ENCOUNTER (OUTPATIENT)
Dept: PHYSICAL THERAPY | Age: 77
Discharge: HOME | End: 2017-10-04
Admitting: ANESTHESIOLOGY

## 2017-10-03 DIAGNOSIS — M43.17 SPONDYLOLISTHESIS OF LUMBOSACRAL REGION: ICD-10-CM

## 2017-10-03 DIAGNOSIS — F11.90 CHRONIC, CONTINUOUS USE OF OPIOIDS: ICD-10-CM

## 2017-10-03 RX ORDER — OXYCODONE AND ACETAMINOPHEN 10; 325 MG/1; MG/1
1 TABLET ORAL EVERY 8 HOURS PRN
Qty: 60 TABLET | Refills: 0 | Status: SHIPPED | OUTPATIENT
Start: 2017-10-03 | End: 2017-10-20 | Stop reason: SDUPTHER

## 2017-10-03 NOTE — TELEPHONE ENCOUNTER
Pt is calling for med refill on her Percocet 10mg. Pt states that she has 3 left, and all so states that if she can not get a refill she would like to come in for a f/u. Last seen 8/23/17  Disposition 10/20/17  Ok to refill?

## 2017-10-20 ENCOUNTER — OFFICE VISIT (OUTPATIENT)
Dept: PAIN MANAGEMENT | Age: 77
End: 2017-10-20

## 2017-10-20 VITALS
HEART RATE: 78 BPM | DIASTOLIC BLOOD PRESSURE: 74 MMHG | WEIGHT: 137 LBS | BODY MASS INDEX: 22.82 KG/M2 | SYSTOLIC BLOOD PRESSURE: 133 MMHG | HEIGHT: 65 IN

## 2017-10-20 DIAGNOSIS — M54.17 LUMBOSACRAL RADICULOPATHY: Primary | ICD-10-CM

## 2017-10-20 DIAGNOSIS — M43.17 SPONDYLOLISTHESIS OF LUMBOSACRAL REGION: ICD-10-CM

## 2017-10-20 DIAGNOSIS — F11.90 CHRONIC, CONTINUOUS USE OF OPIOIDS: ICD-10-CM

## 2017-10-20 PROCEDURE — G8420 CALC BMI NORM PARAMETERS: HCPCS | Performed by: ANESTHESIOLOGY

## 2017-10-20 PROCEDURE — 4040F PNEUMOC VAC/ADMIN/RCVD: CPT | Performed by: ANESTHESIOLOGY

## 2017-10-20 PROCEDURE — G8427 DOCREV CUR MEDS BY ELIG CLIN: HCPCS | Performed by: ANESTHESIOLOGY

## 2017-10-20 PROCEDURE — 4004F PT TOBACCO SCREEN RCVD TLK: CPT | Performed by: ANESTHESIOLOGY

## 2017-10-20 PROCEDURE — 1090F PRES/ABSN URINE INCON ASSESS: CPT | Performed by: ANESTHESIOLOGY

## 2017-10-20 PROCEDURE — G8400 PT W/DXA NO RESULTS DOC: HCPCS | Performed by: ANESTHESIOLOGY

## 2017-10-20 PROCEDURE — 1123F ACP DISCUSS/DSCN MKR DOCD: CPT | Performed by: ANESTHESIOLOGY

## 2017-10-20 PROCEDURE — 3017F COLORECTAL CA SCREEN DOC REV: CPT | Performed by: ANESTHESIOLOGY

## 2017-10-20 PROCEDURE — G8484 FLU IMMUNIZE NO ADMIN: HCPCS | Performed by: ANESTHESIOLOGY

## 2017-10-20 PROCEDURE — 99213 OFFICE O/P EST LOW 20 MIN: CPT | Performed by: ANESTHESIOLOGY

## 2017-10-20 RX ORDER — OXYCODONE AND ACETAMINOPHEN 10; 325 MG/1; MG/1
1 TABLET ORAL 3 TIMES DAILY PRN
Qty: 90 TABLET | Refills: 0 | Status: SHIPPED | OUTPATIENT
Start: 2017-10-23 | End: 2017-11-16 | Stop reason: SDUPTHER

## 2017-10-20 ASSESSMENT — ENCOUNTER SYMPTOMS
EYE REDNESS: 0
WHEEZING: 0
ORTHOPNEA: 0
SHORTNESS OF BREATH: 0
BACK PAIN: 0
COUGH: 0
EYE PAIN: 0
ABDOMINAL PAIN: 0
VOMITING: 0
EYE DISCHARGE: 0
NAUSEA: 0
HEARTBURN: 0
HEMOPTYSIS: 0
CONSTIPATION: 0

## 2017-10-20 NOTE — PROGRESS NOTES
[facility], [place]  [address], [place], [address]  [phone] (H)  [phone] (f)    10/20/17        Durwood Fear  1940      Juan Carroll MD      Chief Complain:   Chief Complaint   Patient presents with    Lower Back Pain     2 month f/u on lumbar pain that radiates down to the Lt leg to the calf        History of Present Illness   HPI    Chronic progressive right sided low back pain episodically since 1970, progressive for the past 2 years, worsened after hospital admission for a fall 7 months ago; no acute changes in bladder or bowel. Pain is achy, throbbing, sharp in nature with radiation to the legs (L>R); no weakness. Pain worse in the morning, and worsened by lifting, bending, twisting, changing position and helped by opioid pain medications. Reports she tried possible spinal injections last year without any benefit. She has been on chronic opiates for several years.     RED FLAG symptoms (Symptoms may include, but not limited to, acute trauma, fever, drug use, malignancy, weakness, perianal numbness, bladder/bowel changes) since last visit - No    Changes since last visit:   Currently in PT      Past Medical History:   Diagnosis Date    Anxiety     Colon polyps     check q5yrs    HTN (hypertension) 10/11    Hyperlipidemia     Proteinuria 10/11    Screening mammogram 2010    benign       Past Surgical History:   Procedure Laterality Date    COLONOSCOPY  2010    Dr. Heather Proctor - recheck 5 yrs    ERCP  5/8/2015    sphincter and stent    WRIST FRACTURE SURGERY Left 12/23/2016    OPEN REDUCTION INTERNAL FIXATION LEFT DISTAL RADIUS FRACTURE       Allergies   Allergen Reactions    Penicillins Swelling     Swelling in vaginal area only  Vaginal swelling       Current Outpatient Prescriptions   Medication Sig Dispense Refill    [START ON 10/23/2017] oxyCODONE-acetaminophen (PERCOCET)  MG per tablet Take 1 tablet by mouth 3 times daily as needed (for use of opioids  ORT score = Low; risk factors - depression, advanced age. Plan:     1. Chronic pain in lower back with radiation to legs - no acute changes. 2. Reports minimal help from PT; pain moderately controlled on Percocet 10 mg TID PRN. 3. Failed conservative care with PT, NSAIDs and medications over the past 3 months - recommend MRI lumbar spine. 4. Discussed further options including injections vs. Surgery. Meanwhile continue care as above; follow with us after MRI for further options. - Failed conservative care with PT and medications. - Based on an analysis of risks, benefits, and alternatives, prescription of opioid appears appropriate for management of his chronic pain. Analgesic Plan:   Continue present regimen: Yes   Adjust dose of present analgesic: No   Switch analgesics: No   Add/Adjust concomitant therapy: No     - Encouraged regular home exercises and back strengthening as long-term goals. - Counseled on role and limitations of medications and injections.  - Educational material provided on multidisciplinary chronic pain management, safe opioid use. Controlled Substances Monitoring: Attestation: The Prescription Monitoring Report for this patient was reviewed today. Robi Zuniga MD)  Documentation: Possible medication side effects, risk of tolerance and/or dependence, and alternative treatments discussed., Obtaining appropriate analgesic effect of treatment., No signs of potential drug abuse or diversion identified., Existing medication contract. Robi Zuniga MD)    The entire treatment plan was discussed with patient and agreed. More than 25 minutes spent on face-to-face encounter with the patient by the provider. More than 50% of the time spent on counseling/coordination of care regarding chronic pain.         Amrita Nguyen MD  Board Certified in Anesthesiology and Pain Medicine

## 2017-10-20 NOTE — PATIENT INSTRUCTIONS
1. Continue home exercises. 2. I have ordered MRI for the lower back; continue Percocet for severe pain as provided. 3. Follow with us after MRI is completed for further options. Patient Education        Low Back Pain: Exercises  Your Care Instructions  Here are some examples of typical rehabilitation exercises for your condition. Start each exercise slowly. Ease off the exercise if you start to have pain. Your doctor or physical therapist will tell you when you can start these exercises and which ones will work best for you. How to do the exercises  Press-up    1. Lie on your stomach, supporting your body with your forearms. 2. Press your elbows down into the floor to raise your upper back. As you do this, relax your stomach muscles and allow your back to arch without using your back muscles. As your press up, do not let your hips or pelvis come off the floor. 3. Hold for 15 to 30 seconds, then relax. 4. Repeat 2 to 4 times. Alternate arm and leg (bird dog) exercise    Note: Do this exercise slowly. Try to keep your body straight at all times, and do not let one hip drop lower than the other. 1. Start on the floor, on your hands and knees. 2. Tighten your belly muscles. 3. Raise one leg off the floor, and hold it straight out behind you. Be careful not to let your hip drop down, because that will twist your trunk. 4. Hold for about 6 seconds, then lower your leg and switch to the other leg. 5. Repeat 8 to 12 times on each leg. 6. Over time, work up to holding for 10 to 30 seconds each time. 7. If you feel stable and secure with your leg raised, try raising the opposite arm straight out in front of you at the same time. Knee-to-chest exercise    1. Lie on your back with your knees bent and your feet flat on the floor. 2. Bring one knee to your chest, keeping the other foot flat on the floor (or keeping the other leg straight, whichever feels better on your lower back).   3. Keep your lower back pressed to the floor. Hold for at least 15 to 30 seconds. 4. Relax, and lower the knee to the starting position. 5. Repeat with the other leg. Repeat 2 to 4 times with each leg. 6. To get more stretch, put your other leg flat on the floor while pulling your knee to your chest.  Curl-ups    1. Lie on the floor on your back with your knees bent at a 90-degree angle. Your feet should be flat on the floor, about 12 inches from your buttocks. 2. Cross your arms over your chest. If this bothers your neck, try putting your hands behind your neck (not your head), with your elbows spread apart. 3. Slowly tighten your belly muscles and raise your shoulder blades off the floor. 4. Keep your head in line with your body, and do not press your chin to your chest.  5. Hold this position for 1 or 2 seconds, then slowly lower yourself back down to the floor. 6. Repeat 8 to 12 times. Pelvic tilt exercise    1. Lie on your back with your knees bent. 2. \"Brace\" your stomach. This means to tighten your muscles by pulling in and imagining your belly button moving toward your spine. You should feel like your back is pressing to the floor and your hips and pelvis are rocking back. 3. Hold for about 6 seconds while you breathe smoothly. 4. Repeat 8 to 12 times. Heel dig bridging    1. Lie on your back with both knees bent and your ankles bent so that only your heels are digging into the floor. Your knees should be bent about 90 degrees. 2. Then push your heels into the floor, squeeze your buttocks, and lift your hips off the floor until your shoulders, hips, and knees are all in a straight line. 3. Hold for about 6 seconds as you continue to breathe normally, and then slowly lower your hips back down to the floor and rest for up to 10 seconds. 4. Do 8 to 12 repetitions. Hamstring stretch in doorway    1. Lie on your back in a doorway, with one leg through the open door. 2. Slide your leg up the wall to straighten your knee.

## 2017-10-25 ENCOUNTER — HOSPITAL ENCOUNTER (OUTPATIENT)
Dept: MRI IMAGING | Age: 77
Discharge: HOME | End: 2017-10-25
Attending: ANESTHESIOLOGY | Admitting: ANESTHESIOLOGY

## 2017-10-25 DIAGNOSIS — M54.17 LUMBOSACRAL NEURITIS: ICD-10-CM

## 2017-10-25 DIAGNOSIS — M54.17 RADICULOPATHY OF LUMBOSACRAL REGION: ICD-10-CM

## 2017-11-01 ENCOUNTER — HOSPITAL ENCOUNTER (OUTPATIENT)
Dept: PHYSICAL THERAPY | Age: 77
Discharge: HOME | End: 2017-11-30
Attending: ANESTHESIOLOGY | Admitting: ANESTHESIOLOGY

## 2017-11-03 ENCOUNTER — OFFICE VISIT (OUTPATIENT)
Dept: PAIN MANAGEMENT | Age: 77
End: 2017-11-03

## 2017-11-03 VITALS
WEIGHT: 137 LBS | HEART RATE: 80 BPM | BODY MASS INDEX: 22.82 KG/M2 | HEIGHT: 65 IN | SYSTOLIC BLOOD PRESSURE: 175 MMHG | DIASTOLIC BLOOD PRESSURE: 84 MMHG

## 2017-11-03 DIAGNOSIS — F11.90 CHRONIC, CONTINUOUS USE OF OPIOIDS: ICD-10-CM

## 2017-11-03 DIAGNOSIS — M48.062 SPINAL STENOSIS OF LUMBAR REGION WITH NEUROGENIC CLAUDICATION: Primary | ICD-10-CM

## 2017-11-03 DIAGNOSIS — M43.16 SPONDYLOLISTHESIS, LUMBAR REGION: ICD-10-CM

## 2017-11-03 PROCEDURE — G8427 DOCREV CUR MEDS BY ELIG CLIN: HCPCS | Performed by: ANESTHESIOLOGY

## 2017-11-03 PROCEDURE — 1090F PRES/ABSN URINE INCON ASSESS: CPT | Performed by: ANESTHESIOLOGY

## 2017-11-03 PROCEDURE — 99213 OFFICE O/P EST LOW 20 MIN: CPT | Performed by: ANESTHESIOLOGY

## 2017-11-03 PROCEDURE — 4040F PNEUMOC VAC/ADMIN/RCVD: CPT | Performed by: ANESTHESIOLOGY

## 2017-11-03 PROCEDURE — G8484 FLU IMMUNIZE NO ADMIN: HCPCS | Performed by: ANESTHESIOLOGY

## 2017-11-03 PROCEDURE — 3017F COLORECTAL CA SCREEN DOC REV: CPT | Performed by: ANESTHESIOLOGY

## 2017-11-03 PROCEDURE — G8400 PT W/DXA NO RESULTS DOC: HCPCS | Performed by: ANESTHESIOLOGY

## 2017-11-03 PROCEDURE — 1123F ACP DISCUSS/DSCN MKR DOCD: CPT | Performed by: ANESTHESIOLOGY

## 2017-11-03 PROCEDURE — 4004F PT TOBACCO SCREEN RCVD TLK: CPT | Performed by: ANESTHESIOLOGY

## 2017-11-03 PROCEDURE — G8420 CALC BMI NORM PARAMETERS: HCPCS | Performed by: ANESTHESIOLOGY

## 2017-11-03 ASSESSMENT — ENCOUNTER SYMPTOMS
HEMOPTYSIS: 0
SHORTNESS OF BREATH: 0
EYE PAIN: 0
EYE DISCHARGE: 0
WHEEZING: 0
NAUSEA: 0
VOMITING: 0
COUGH: 0
HEARTBURN: 0
ORTHOPNEA: 0
EYE REDNESS: 0
CONSTIPATION: 0
ABDOMINAL PAIN: 0
BACK PAIN: 1

## 2017-11-03 NOTE — PATIENT INSTRUCTIONS
1. Continue home exercises and Percocet for pain as provided. 2. I have referred you to spine surgeon for consultation. 3. Follow with us after surgical consultation      Patient Education        Lumbar Spinal Stenosis: Care Instructions  Your Care Instructions    Stenosis in the spine is a narrowing of the canal that is around the spinal cord and nerve roots in your back. It can happen as part of aging. Sometimes bone and other tissue grow into this canal and press on the nerves that branch out from the spinal cord. This can cause pain, numbness, and weakness. When it happens in the lower part of your back, it is called lumbar spinal stenosis. It can cause problems in the legs, feet, and rear end (buttocks). You may be able to get relief from the symptoms of spinal stenosis by taking pain medicine. Your doctor may suggest physical therapy and exercises to keep your spine strong and flexible. Some people try steroid shots to reduce swelling. If pain and numbness in your legs are still so bad that you cannot do your normal activities, you may need surgery. Follow-up care is a key part of your treatment and safety. Be sure to make and go to all appointments, and call your doctor if you are having problems. It's also a good idea to know your test results and keep a list of the medicines you take. How can you care for yourself at home? · Take an over-the-counter pain medicine, such as acetaminophen (Tylenol), ibuprofen (Advil, Motrin), or naproxen (Aleve). Be safe with medicines. Read and follow all instructions on the label. · Do not take two or more pain medicines at the same time unless the doctor told you to. Many pain medicines have acetaminophen, which is Tylenol. Too much acetaminophen (Tylenol) can be harmful. · Stay at a healthy weight. Being overweight puts extra strain on your spine. · Change positions often when you sit or stand. This can ease pain.  It may also reduce pressure on the spinal cord and instructions adapted under license by [facility] ([facility]). If you have questions about a medical condition or this instruction, always ask your healthcare professional. [address] any warranty or liability for your use of this information. Patient Education        Safe Use of Opioid Pain Medicine: Care Instructions  Your Care Instructions  Pain is your body's way of warning you that something is wrong. Pain feels different for everybody. Only you can describe your pain. A doctor can suggest or prescribe many types of medicines for pain. These range from nonprescription medicines like acetaminophen (Tylenol) to powerful medicines called opioids. Opioids work well to relieve pain. But they also can cause problems, especially if they are taken too often or in too large a dose. They can interact with other medicines, or they may make it hard for you to do your job or to think clearly. They can even cause death. For these reasons, doctors are very careful about how they prescribe opioids. The doctor carefully considered what pain medicine is right for you. You may not have received opioid pain medicine if your doctor was concerned about drug interactions or your safety, or if he or she had other concerns. It is best to have one doctor or clinic treat your pain. This way you will get the pain medicine that will help you the most, and a doctor will be able to watch for any problems that the medicine might cause. The doctor has checked you carefully, but problems can develop later. If you notice any problems or new symptoms, get medical treatment right away. Follow-up care is a key part of your treatment and safety. Be sure to make and go to all appointments, and call your doctor if you are having problems. It's also a good idea to know your test results and keep a list of the medicines you take. How can you care for yourself at home?   · Try other ways to reduce pain:  ¨ Relax, and reduce

## 2017-11-09 ENCOUNTER — TELEPHONE (OUTPATIENT)
Dept: INTERNAL MEDICINE | Age: 77
End: 2017-11-09

## 2017-11-09 DIAGNOSIS — F32.A ANXIETY AND DEPRESSION: ICD-10-CM

## 2017-11-09 DIAGNOSIS — F41.9 ANXIETY AND DEPRESSION: ICD-10-CM

## 2017-11-09 NOTE — TELEPHONE ENCOUNTER
Patient needs a refill on ALPRAZolam (XANAX) 0.5 MG tablet     . They need a 30 day supply.      Mail order or local pharmacy: local    Pharmacy:     Patient  or mail to patient(If mail order):patient

## 2017-11-10 RX ORDER — ALPRAZOLAM 0.5 MG/1
TABLET ORAL
Qty: 90 TABLET | Refills: 0 | Status: SHIPPED | OUTPATIENT
Start: 2017-11-10 | End: 2017-12-15 | Stop reason: SDUPTHER

## 2017-11-10 NOTE — TELEPHONE ENCOUNTER
Pt calling back and was informed that Xanax pended for Gabby Toledo MD to review    Advised Pt to check with Pharm in 24-48  hours . ..  Pls send     Pt states she will be completely out of medication   Please call pt back at [phone]

## 2017-11-14 ENCOUNTER — TELEPHONE (OUTPATIENT)
Dept: PAIN MANAGEMENT | Age: 77
End: 2017-11-14

## 2017-11-14 NOTE — TELEPHONE ENCOUNTER
Pt called and would like to come in for a f/u or make an injection appt. Pt stated that she is trying to go to the [facility]. Pt stated that Jayla told her that she would have to have injections done before she can be seen with them.

## 2017-11-16 DIAGNOSIS — M43.17 SPONDYLOLISTHESIS OF LUMBOSACRAL REGION: ICD-10-CM

## 2017-11-16 RX ORDER — OXYCODONE AND ACETAMINOPHEN 10; 325 MG/1; MG/1
1 TABLET ORAL 3 TIMES DAILY PRN
Qty: 90 TABLET | Refills: 0 | Status: SHIPPED | OUTPATIENT
Start: 2017-11-22 | End: 2017-12-13 | Stop reason: SDUPTHER

## 2017-12-07 ENCOUNTER — HOSPITAL ENCOUNTER (OUTPATIENT)
Dept: CARDIAC CATH/INVASIVE PROCEDURES | Age: 77
Discharge: HOME | End: 2017-12-07
Attending: ANESTHESIOLOGY | Admitting: ANESTHESIOLOGY

## 2017-12-07 DIAGNOSIS — M48.062 LUMBAR STENOSIS WITH NEUROGENIC CLAUDICATION: ICD-10-CM

## 2017-12-07 NOTE — PROCEDURES
Procedure: Caudal Epidural steroid injection  under fluorsocopy     Anesthesia: Local  Blood loss: None  Complications: None     Patient has chronic low back and leg pain. No improvement with PT, medications and here for trial of epidural steroid injections.     The entire procedure was done under sterile precautions. After informed consent, patient was placed prone and monitors placed. Time-out session was done. The back was cleaned with Chloraprep and sterile drapes placed. After infiltrating skin and subcutaneous tissues with 1% lidocaine, I used 22g spinal needle to access caudal epidural space under fluoroscopy. The needle tip was verified on lateral view. After negative aspiration for blood and CSF, I placed 1 ml of Omnipaque 180, which showed epidural spread without vascular uptake. Then after negative aspiration, I placed 10 ml of a mixture of Kenalog 80 mg in preservative free normal saline incrementally. Then the needle was removed, hemostasis obtained and needle entry site covered with band-aid. Patient tolerated procedure well without any problems, was ambulatory and discharged in stable condition.     Plan:  I will assess benefit in 2 weeks and repeat CASSIE if needed in a month.         Gracie Gunderson MD  Board Certified in Anesthesiology and Pain Medicine

## 2017-12-07 NOTE — H&P
Cc: low back and leg pain    Past Medical History:   Diagnosis Date    Anxiety     Colon polyps     check q5yrs    HTN (hypertension) 10/11    Hyperlipidemia     Proteinuria 10/11    Screening mammogram 2010    benign       Past Surgical History:   Procedure Laterality Date    COLONOSCOPY  2010    Dr. Parish Dural - recheck 5 yrs    ERCP  5/8/2015    sphincter and stent    WRIST FRACTURE SURGERY Left 12/23/2016    OPEN REDUCTION INTERNAL FIXATION LEFT DISTAL RADIUS FRACTURE       Allergies   Allergen Reactions    Penicillins Swelling     Swelling in vaginal area only  Vaginal swelling       Current Outpatient Prescriptions on File Prior to Encounter   Medication Sig Dispense Refill    oxyCODONE-acetaminophen (PERCOCET)  MG per tablet Take 1 tablet by mouth 3 times daily as needed (for severe pain)  Refill date: 11/22/2017. Earliest Fill Date: 11/22/17 90 tablet 0    ALPRAZolam (XANAX) 0.5 MG tablet Take 1 tid prn  . 90 tablet 0    venlafaxine (EFFEXOR XR) 75 MG extended release capsule Take 1 capsule by mouth daily 30 capsule 5    fluconazole (DIFLUCAN) 100 MG tablet Take 1 tablet by mouth daily 7 tablet 0    ibuprofen (ADVIL;MOTRIN) 200 MG tablet Take 200 mg by mouth every 6 hours as needed for Pain       No current facility-administered medications on file prior to encounter. REVIEW OF SYSTEMS  Constitutional: No weight loss, malaise or fevers.   HEENT: Negative for frequent or significant headaches  Resp: Negative for cough, wheezing, or shortness of breath  Cardiovascular: Negative for chest pain, leg swelling or palpitations  GI: Negative for abdominal discomfort, blood in stools or black stools or change in bowel habits  : No history of dysuria, frequency, or incontinence  Endo: Negative for cold or heat intolerance, polyuria, polydipsia and goiter  Heme/Lymph: Negative for prolonged bleeding, bruising easily or swollen nodes  Neurologic: No history or headaches, syncope, paralysis, seizures or tremors      PHYSICAL EXAM  Constitutional: Well developed, Well nourished and No acute distress  HEENT: PERRLA and EOM's intact  Resp: Clear , Respiratory effort: normal and Chest wall: Normal  Cardiovascular: Regular rate and rhythm, no m/r/g  GI: Soft, Non-tender, Bowel sounds present and Masses - no  Integumentary: Warm, Dry and No rash on chest, arms or legs  Musculoskeletal: No deformities  Neurological/Psychiatric: Oriented to time, place & person , Alert and No gross focal neurologic deficits      DIAGNOSIS: Lumbar spinal stenosis    PROCEDURE: Lumbar/caudal Epidural steroid injection under fluoroscopy      Discussed procedure, benefits, possible complications and alternatives with patient in detail, and agreed.     Irma Gold MD  Board Certified in Anesthesiology and Pain Medicine

## 2017-12-12 DIAGNOSIS — F32.A ANXIETY AND DEPRESSION: ICD-10-CM

## 2017-12-12 DIAGNOSIS — F41.9 ANXIETY AND DEPRESSION: ICD-10-CM

## 2017-12-12 RX ORDER — ALPRAZOLAM 0.5 MG/1
TABLET ORAL
Qty: 90 TABLET | Refills: 0 | OUTPATIENT
Start: 2017-12-12

## 2017-12-13 ENCOUNTER — TELEPHONE (OUTPATIENT)
Dept: PAIN MANAGEMENT | Age: 77
End: 2017-12-13

## 2017-12-13 DIAGNOSIS — M43.17 SPONDYLOLISTHESIS OF LUMBOSACRAL REGION: ICD-10-CM

## 2017-12-13 RX ORDER — OXYCODONE AND ACETAMINOPHEN 10; 325 MG/1; MG/1
1 TABLET ORAL 3 TIMES DAILY PRN
Qty: 90 TABLET | Refills: 0 | Status: SHIPPED | OUTPATIENT
Start: 2017-12-21 | End: 2018-01-19 | Stop reason: ALTCHOICE

## 2017-12-14 ENCOUNTER — TELEPHONE (OUTPATIENT)
Dept: INTERNAL MEDICINE | Age: 77
End: 2017-12-14

## 2017-12-14 DIAGNOSIS — F41.9 ANXIETY AND DEPRESSION: ICD-10-CM

## 2017-12-14 DIAGNOSIS — F32.A ANXIETY AND DEPRESSION: ICD-10-CM

## 2017-12-14 NOTE — TELEPHONE ENCOUNTER
Pt called needs RX for [employer] to . .. Pharm on file . .. Advised Pt to check with Pharm in 24 hours . ..  Pls send    Pt has appt for 12/22- Needs medication till appt

## 2017-12-15 RX ORDER — ALPRAZOLAM 0.5 MG/1
TABLET ORAL
Qty: 30 TABLET | Refills: 0 | OUTPATIENT
Start: 2017-12-15 | End: 2017-12-22

## 2017-12-19 NOTE — TELEPHONE ENCOUNTER
The patient is stating that the pharmacy she took her last pill today. She says that the bottle from the pharmacy says 90 but they must have shorted her 7 pills. She said that she is absolutely not taking anymore than three per day. I told her that she needs to start counting her pills at the pharmacy before she leaves the counter; especially with controlled substances. She is requesting that you would give her just 7 pills until Thursday when she can fill the prescription that she has already to fill 12/21/2017.

## 2017-12-21 ENCOUNTER — HOSPITAL ENCOUNTER (OUTPATIENT)
Dept: CARDIAC CATH/INVASIVE PROCEDURES | Age: 77
Discharge: HOME | End: 2017-12-21
Attending: ANESTHESIOLOGY | Admitting: ANESTHESIOLOGY

## 2017-12-21 DIAGNOSIS — M51.36 LUMBAR ADJACENT SEGMENT DISEASE WITH SPONDYLOLISTHESIS: ICD-10-CM

## 2017-12-21 DIAGNOSIS — M43.16 LUMBAR ADJACENT SEGMENT DISEASE WITH SPONDYLOLISTHESIS: ICD-10-CM

## 2017-12-21 ASSESSMENT — ENCOUNTER SYMPTOMS
COUGH: 0
CONSTIPATION: 0
ORTHOPNEA: 0
EYE PAIN: 0
EYE DISCHARGE: 0
NAUSEA: 0
SHORTNESS OF BREATH: 0
EYE REDNESS: 0
HEMOPTYSIS: 0
HEARTBURN: 0
ABDOMINAL PAIN: 0
WHEEZING: 0
BACK PAIN: 1
VOMITING: 0

## 2017-12-21 NOTE — H&P
[address], [place], [address]  [phone] (D)  [phone] (f)      12/21/17      Eyal Aldridgeintosh  1940       Fortunato Kearns MD        Chief Complain:      Low back and leg pain    No chief complaint on file. History of Present Illness   HPI    Low back and left leg pain      Past Medical History:   Diagnosis Date    Anxiety     Colon polyps     check q5yrs    HTN (hypertension) 10/11    Hyperlipidemia     Proteinuria 10/11    Screening mammogram 2010    benign       Past Surgical History:   Procedure Laterality Date    COLONOSCOPY  2010    Dr. Deonna Mijares - recheck 5 yrs    ERCP  5/8/2015    sphincter and stent    WRIST FRACTURE SURGERY Left 12/23/2016    OPEN REDUCTION INTERNAL FIXATION LEFT DISTAL RADIUS FRACTURE       Allergies   Allergen Reactions    Penicillins Swelling     Swelling in vaginal area only  Vaginal swelling       Current Outpatient Prescriptions   Medication Sig Dispense Refill    ALPRAZolam (XANAX) 0.5 MG tablet Take 1 tid prn  . 30 tablet 0    oxyCODONE-acetaminophen (PERCOCET)  MG per tablet Take 1 tablet by mouth 3 times daily as needed (for severe pain)  Refill date: 11/22/2017. Earliest Fill Date: 12/21/17 90 tablet 0    venlafaxine (EFFEXOR XR) 75 MG extended release capsule Take 1 capsule by mouth daily 30 capsule 5    fluconazole (DIFLUCAN) 100 MG tablet Take 1 tablet by mouth daily 7 tablet 0    ibuprofen (ADVIL;MOTRIN) 200 MG tablet Take 200 mg by mouth every 6 hours as needed for Pain       No current facility-administered medications for this encounter. Family History   Problem Relation Age of Onset    Diabetes Mother     Heart Disease Father     Cancer Brother     Kidney Disease Brother        Social History     Social History    Marital status:      Spouse name: N/A    Number of children: N/A    Years of education: N/A     Occupational History    Not on file.      Social

## 2017-12-22 ENCOUNTER — OFFICE VISIT (OUTPATIENT)
Dept: INTERNAL MEDICINE | Age: 77
End: 2017-12-22

## 2017-12-22 VITALS
DIASTOLIC BLOOD PRESSURE: 80 MMHG | WEIGHT: 129 LBS | HEART RATE: 84 BPM | BODY MASS INDEX: 21.49 KG/M2 | SYSTOLIC BLOOD PRESSURE: 150 MMHG | HEIGHT: 65 IN

## 2017-12-22 DIAGNOSIS — F41.9 ANXIETY AND DEPRESSION: ICD-10-CM

## 2017-12-22 DIAGNOSIS — F32.A ANXIETY AND DEPRESSION: ICD-10-CM

## 2017-12-22 DIAGNOSIS — F17.210 CIGARETTE SMOKER: ICD-10-CM

## 2017-12-22 DIAGNOSIS — I10 ESSENTIAL HYPERTENSION: ICD-10-CM

## 2017-12-22 DIAGNOSIS — G89.4 CHRONIC PAIN SYNDROME: ICD-10-CM

## 2017-12-22 PROCEDURE — 1123F ACP DISCUSS/DSCN MKR DOCD: CPT | Performed by: INTERNAL MEDICINE

## 2017-12-22 PROCEDURE — G8484 FLU IMMUNIZE NO ADMIN: HCPCS | Performed by: INTERNAL MEDICINE

## 2017-12-22 PROCEDURE — 3017F COLORECTAL CA SCREEN DOC REV: CPT | Performed by: INTERNAL MEDICINE

## 2017-12-22 PROCEDURE — 99214 OFFICE O/P EST MOD 30 MIN: CPT | Performed by: INTERNAL MEDICINE

## 2017-12-22 PROCEDURE — 1090F PRES/ABSN URINE INCON ASSESS: CPT | Performed by: INTERNAL MEDICINE

## 2017-12-22 PROCEDURE — 4004F PT TOBACCO SCREEN RCVD TLK: CPT | Performed by: INTERNAL MEDICINE

## 2017-12-22 PROCEDURE — G8420 CALC BMI NORM PARAMETERS: HCPCS | Performed by: INTERNAL MEDICINE

## 2017-12-22 PROCEDURE — 4040F PNEUMOC VAC/ADMIN/RCVD: CPT | Performed by: INTERNAL MEDICINE

## 2017-12-22 PROCEDURE — G8427 DOCREV CUR MEDS BY ELIG CLIN: HCPCS | Performed by: INTERNAL MEDICINE

## 2017-12-22 PROCEDURE — G8400 PT W/DXA NO RESULTS DOC: HCPCS | Performed by: INTERNAL MEDICINE

## 2017-12-22 RX ORDER — ALPRAZOLAM 0.5 MG/1
TABLET ORAL
Qty: 90 TABLET | Refills: 2 | Status: SHIPPED | OUTPATIENT
Start: 2017-12-22 | End: 2018-03-23 | Stop reason: SDUPTHER

## 2017-12-22 ASSESSMENT — ENCOUNTER SYMPTOMS
ABDOMINAL PAIN: 1
BACK PAIN: 1
SHORTNESS OF BREATH: 0

## 2017-12-22 NOTE — ASSESSMENT & PLAN NOTE
Status post injections. CASSIE. Mild help in the past. Her right abdominal pain actually left for a while but has since returned. Per her pain clinic.

## 2017-12-22 NOTE — PROGRESS NOTES
SUBJECTIVE:    Patient ID: Basim Trinh is an 78 y.o. female. HPI: Patient here today for the f/u of chronic problems -- see Problem List and associated comments. New issues or complaints include (also see Assessment for more details):  Here for refill of her Xanax. Her depression and anxiety is stable and not significant weight change over the last 3-4 years. Has coincided with her chronic pain syndrome. She sustained functional and productive on medications and current treatments. Review of Systems   Constitutional: Negative for activity change and appetite change. Respiratory: Negative for shortness of breath. Cardiovascular: Negative for chest pain. Gastrointestinal: Positive for abdominal pain (Recurrent/persistentright side). Musculoskeletal: Positive for back pain. Neurological: Positive for numbness (Right handstatus post wrist fracture). Psychiatric/Behavioral: Negative for behavioral problems, dysphoric mood, sleep disturbance and suicidal ideas. The patient is nervous/anxious. OBJECTIVE:    BP (!) 150/80 (Site: Left Arm, Position: Sitting, Cuff Size: Medium Adult)   Pulse 84   Ht 5' 5\" (1.651 m)   Wt 129 lb (58.5 kg)   BMI 21.47 kg/m²      Physical Exam   Constitutional: She is oriented to person, place, and time. She appears well-developed and well-nourished. No distress. Eyes: EOM are normal. No scleral icterus. Cardiovascular: Normal rate and regular rhythm. Pulmonary/Chest: Effort normal. No stridor. No respiratory distress. She has decreased breath sounds. She has no wheezes. She has no rhonchi. Abdominal: Soft. Bowel sounds are normal.   Musculoskeletal: She exhibits no edema. Lymphadenopathy:     She has no cervical adenopathy. Neurological: She is alert and oriented to person, place, and time. Skin: She is not diaphoretic. No pallor.    Psychiatric: Her speech is normal and behavior is normal. Judgment and thought content normal. Her mood appears not anxious. Cognition and memory are normal. She does not exhibit a depressed mood. ASSESSMENT:       Encounter Diagnoses   Name Primary?  Anxiety and depression     Cigarette smoker     Essential hypertension     Chronic pain syndrome        Anxiety and depression  Monitor report done. Refill Xanax. Medication worked well for her. She is well aware of potential interactions with pain medications. So far she has doing well with both. Cigarette smoker  Smoking again. Discussed. Essential hypertension  BP OK - continue current meds and treatment as is. Chronic pain syndrome  Status post injections. CASSIE. Mild help in the past. Her right abdominal pain actually left for a while but has since returned. Per her pain clinic. PLAN:  See ASSESSMENT for evaluation & PLAN    No orders of the defined types were placed in this encounter. PSH, PMH, SH and FH reviewed and noted. Recent and past labs, tests and consults also reviewed. Recent or new meds also reviewed.

## 2017-12-22 NOTE — ASSESSMENT & PLAN NOTE
Monitor report done. Refill Xanax. Medication worked well for her. She is well aware of potential interactions with pain medications. So far she has doing well with both.

## 2018-01-04 ENCOUNTER — TELEPHONE (OUTPATIENT)
Dept: PAIN MANAGEMENT | Age: 78
End: 2018-01-04

## 2018-01-04 DIAGNOSIS — M48.062 SPINAL STENOSIS OF LUMBAR REGION WITH NEUROGENIC CLAUDICATION: ICD-10-CM

## 2018-01-04 DIAGNOSIS — M43.17 SPONDYLOLISTHESIS OF LUMBOSACRAL REGION: Primary | ICD-10-CM

## 2018-01-05 PROBLEM — M48.062 SPINAL STENOSIS OF LUMBAR REGION WITH NEUROGENIC CLAUDICATION: Status: ACTIVE | Noted: 2018-01-05

## 2018-01-05 NOTE — TELEPHONE ENCOUNTER
She is stating three per Day of the percocet is not enough she was wondering if you can increase the frequency.

## 2018-01-09 ENCOUNTER — HOSPITAL ENCOUNTER (OUTPATIENT)
Dept: GENERAL RADIOLOGY | Age: 78
Discharge: HOME | End: 2018-01-09
Attending: NEUROLOGICAL SURGERY | Admitting: NEUROLOGICAL SURGERY

## 2018-01-09 DIAGNOSIS — R52 PAIN: ICD-10-CM

## 2018-01-09 DIAGNOSIS — Z13.820 ENCOUNTER FOR IMAGING TO ASSESS OSTEOPOROSIS: ICD-10-CM

## 2018-01-09 DIAGNOSIS — M81.0 AGE-RELATED OSTEOPOROSIS WITHOUT CURRENT PATHOLOGICAL FRACTURE: ICD-10-CM

## 2018-01-19 ENCOUNTER — OFFICE VISIT (OUTPATIENT)
Dept: PAIN MANAGEMENT | Age: 78
End: 2018-01-19

## 2018-01-19 VITALS
HEART RATE: 88 BPM | BODY MASS INDEX: 21.49 KG/M2 | WEIGHT: 129 LBS | HEIGHT: 65 IN | DIASTOLIC BLOOD PRESSURE: 75 MMHG | SYSTOLIC BLOOD PRESSURE: 143 MMHG

## 2018-01-19 DIAGNOSIS — M43.16 SPONDYLOLISTHESIS OF LUMBAR REGION: Primary | ICD-10-CM

## 2018-01-19 DIAGNOSIS — M48.062 SPINAL STENOSIS OF LUMBAR REGION WITH NEUROGENIC CLAUDICATION: ICD-10-CM

## 2018-01-19 DIAGNOSIS — F11.90 CHRONIC, CONTINUOUS USE OF OPIOIDS: ICD-10-CM

## 2018-01-19 PROCEDURE — G8427 DOCREV CUR MEDS BY ELIG CLIN: HCPCS | Performed by: ANESTHESIOLOGY

## 2018-01-19 PROCEDURE — G8420 CALC BMI NORM PARAMETERS: HCPCS | Performed by: ANESTHESIOLOGY

## 2018-01-19 PROCEDURE — 4040F PNEUMOC VAC/ADMIN/RCVD: CPT | Performed by: ANESTHESIOLOGY

## 2018-01-19 PROCEDURE — 1090F PRES/ABSN URINE INCON ASSESS: CPT | Performed by: ANESTHESIOLOGY

## 2018-01-19 PROCEDURE — 1123F ACP DISCUSS/DSCN MKR DOCD: CPT | Performed by: ANESTHESIOLOGY

## 2018-01-19 PROCEDURE — 99213 OFFICE O/P EST LOW 20 MIN: CPT | Performed by: ANESTHESIOLOGY

## 2018-01-19 PROCEDURE — G8399 PT W/DXA RESULTS DOCUMENT: HCPCS | Performed by: ANESTHESIOLOGY

## 2018-01-19 PROCEDURE — 3017F COLORECTAL CA SCREEN DOC REV: CPT | Performed by: ANESTHESIOLOGY

## 2018-01-19 PROCEDURE — G8484 FLU IMMUNIZE NO ADMIN: HCPCS | Performed by: ANESTHESIOLOGY

## 2018-01-19 PROCEDURE — 4004F PT TOBACCO SCREEN RCVD TLK: CPT | Performed by: ANESTHESIOLOGY

## 2018-01-19 RX ORDER — MORPHINE SULFATE 15 MG/1
15 TABLET, FILM COATED, EXTENDED RELEASE ORAL 2 TIMES DAILY
Qty: 30 TABLET | Refills: 0 | Status: SHIPPED | OUTPATIENT
Start: 2018-01-19 | End: 2018-01-30 | Stop reason: SDUPTHER

## 2018-01-19 ASSESSMENT — ENCOUNTER SYMPTOMS
EYE REDNESS: 0
BACK PAIN: 1
ABDOMINAL PAIN: 0
VOMITING: 0
NAUSEA: 0
WHEEZING: 0
HEMOPTYSIS: 0
SHORTNESS OF BREATH: 0
EYE PAIN: 0
HEARTBURN: 0
ORTHOPNEA: 0
CONSTIPATION: 0
COUGH: 0
EYE DISCHARGE: 0

## 2018-01-19 NOTE — PATIENT INSTRUCTIONS
October 14, 2016  Content Version: 11.5  © 3423-6443 [employer]. Care instructions adapted under license by [facility] [place] ([facility]). If you have questions about a medical condition or this instruction, always ask your healthcare professional. [address] any warranty or liability for your use of this information.

## 2018-01-19 NOTE — PROGRESS NOTES
0    ALPRAZolam (XANAX) 0.5 MG tablet Take 1 tid prn  . 90 tablet 2    oxyCODONE-acetaminophen (PERCOCET)  MG per tablet Take 1 tablet by mouth 3 times daily as needed (for severe pain)  Refill date: 11/22/2017. Earliest Fill Date: 12/21/17 90 tablet 0    venlafaxine (EFFEXOR XR) 75 MG extended release capsule Take 1 capsule by mouth daily 30 capsule 5    ibuprofen (ADVIL;MOTRIN) 200 MG tablet Take 200 mg by mouth every 6 hours as needed for Pain      fluconazole (DIFLUCAN) 100 MG tablet Take 1 tablet by mouth daily 7 tablet 0     No current facility-administered medications for this visit. Family History   Problem Relation Age of Onset    Diabetes Mother     Heart Disease Father     Cancer Brother     Kidney Disease Brother        Social History     Social History    Marital status:      Spouse name: N/A    Number of children: N/A    Years of education: N/A     Occupational History    Not on file. Social History Main Topics    Smoking status: Current Every Day Smoker     Packs/day: 2.00     Years: 50.00     Types: Cigarettes     Last attempt to quit: 12/9/2016    Smokeless tobacco: Never Used    Alcohol use No    Drug use: No    Sexual activity: No     Other Topics Concern    Not on file     Social History Narrative    No narrative on file         Imaging Studies:   MRI LS (10/27/2017)  \"Impression       Diffuse degenerative disc disease and degenerative spondylosis   with marked central bilateral foraminal spinal stenosis as   described.       Please see above levels.       Grade 1 spondylolisthesis L4-L5 and L5 on S1.\"          Results of the above studies were personally reviewed by me with the patient in detail along with the images, when available. The patient was instructed to contact the primary care provider regarding other unrelated findings not addressed during today's visit.     Review of Systems:   Review of Systems   Constitutional: Negative for chills, fever, No     Reports lost or stolen prescriptions: No     Attempts to obtain prescriptions from other providers: No     Use of pain medication in response to situational stressor: No      Increasingly unkempt or impaired: No     Negative mood changes: No     Abusing alcohol or illicit drugs: No     Appears intoxicated: No     Other: NA     Controlled Substances Monitoring:    Attestation: The Prescription Monitoring Report for this patient was reviewed today. Kenji Gregory MD)  Documentation: Possible medication side effects, risk of tolerance and/or dependence, and alternative treatments discussed., No signs of potential drug abuse or diversion identified. Kenji Gregory MD)    Based on an analysis of risks, benefits, and alternatives, utilization of prescription drugs is medically reasonable for the treatment of chronic pain. Overall Progress:       PEG Score = 10 / 10     Physical Therapy: Benefit 30%   Counseling: N/A   Injections: Benefit <30%     Patient compliance on plan of care: Fair   Progress on current plan:  Poor    Assessment:    1. Spondylolisthesis of lumbar region  Chronic.  - morphine (MS CONTIN) 15 MG extended release tablet; Take 1 tablet by mouth 2 times daily for 15 days. Dispense: 30 tablet; Refill: 0  - External Referral To Physicial Medicine    2. Spinal stenosis of lumbar region with neurogenic claudication  Chronic  - morphine (MS CONTIN) 15 MG extended release tablet; Take 1 tablet by mouth 2 times daily for 15 days. Dispense: 30 tablet; Refill: 0  - External Referral To Physicial Medicine    3. Chronic, continuous use of opioids  - External Referral To Physicial Medicine      Plan:     1. Chronic progressive low back pain with radiation due to spondylolisthesis and spinal stenosis  2. Tried PT and two lumbar CASSIE without help; Seen spine surgeon at [address] [place] [address] -  surgery offered. 3. She is requesting referral for a 'pain pump' - will refer to [facility] for the same.   4.

## 2018-01-29 ENCOUNTER — TELEPHONE (OUTPATIENT)
Dept: INTERNAL MEDICINE | Age: 78
End: 2018-01-29

## 2018-01-29 DIAGNOSIS — M48.062 SPINAL STENOSIS OF LUMBAR REGION WITH NEUROGENIC CLAUDICATION: ICD-10-CM

## 2018-01-29 DIAGNOSIS — M43.16 SPONDYLOLISTHESIS OF LUMBAR REGION: ICD-10-CM

## 2018-01-30 RX ORDER — MORPHINE SULFATE 15 MG/1
15 TABLET, FILM COATED, EXTENDED RELEASE ORAL 2 TIMES DAILY
Qty: 12 TABLET | Refills: 0 | Status: SHIPPED | OUTPATIENT
Start: 2018-01-30 | End: 2018-02-07 | Stop reason: SDUPTHER

## 2018-02-07 ENCOUNTER — TELEPHONE (OUTPATIENT)
Dept: PAIN MANAGEMENT | Age: 78
End: 2018-02-07

## 2018-02-07 DIAGNOSIS — M48.062 SPINAL STENOSIS OF LUMBAR REGION WITH NEUROGENIC CLAUDICATION: ICD-10-CM

## 2018-02-07 DIAGNOSIS — M43.16 SPONDYLOLISTHESIS OF LUMBAR REGION: ICD-10-CM

## 2018-02-07 RX ORDER — MORPHINE SULFATE 15 MG/1
15 TABLET, FILM COATED, EXTENDED RELEASE ORAL 2 TIMES DAILY
Qty: 20 TABLET | Refills: 0 | Status: SHIPPED | OUTPATIENT
Start: 2018-02-07 | End: 2018-02-15 | Stop reason: ALTCHOICE

## 2018-02-08 ENCOUNTER — TELEPHONE (OUTPATIENT)
Dept: INTERNAL MEDICINE | Age: 78
End: 2018-02-08

## 2018-02-08 RX ORDER — FUROSEMIDE 40 MG/1
40 TABLET ORAL DAILY
Qty: 15 TABLET | Refills: 0 | Status: SHIPPED | OUTPATIENT
Start: 2018-02-08 | End: 2018-02-16 | Stop reason: SDUPTHER

## 2018-02-08 RX ORDER — POTASSIUM CHLORIDE 1.5 G/1.77G
20 POWDER, FOR SOLUTION ORAL 2 TIMES DAILY
Qty: 10 EACH | Refills: 0 | Status: SHIPPED | OUTPATIENT
Start: 2018-02-08 | End: 2018-02-16

## 2018-02-09 RX ORDER — VENLAFAXINE HYDROCHLORIDE 75 MG/1
CAPSULE, EXTENDED RELEASE ORAL
Qty: 30 CAPSULE | Refills: 0 | Status: SHIPPED | OUTPATIENT
Start: 2018-02-09 | End: 2018-03-12 | Stop reason: SDUPTHER

## 2018-02-15 ENCOUNTER — OFFICE VISIT (OUTPATIENT)
Dept: PAIN MANAGEMENT | Age: 78
End: 2018-02-15

## 2018-02-15 VITALS
BODY MASS INDEX: 21.49 KG/M2 | HEART RATE: 77 BPM | DIASTOLIC BLOOD PRESSURE: 63 MMHG | WEIGHT: 129 LBS | SYSTOLIC BLOOD PRESSURE: 110 MMHG | HEIGHT: 65 IN

## 2018-02-15 DIAGNOSIS — F11.90 CHRONIC, CONTINUOUS USE OF OPIOIDS: ICD-10-CM

## 2018-02-15 DIAGNOSIS — M43.17 SPONDYLOLISTHESIS OF LUMBOSACRAL REGION: Primary | ICD-10-CM

## 2018-02-15 DIAGNOSIS — G89.4 CHRONIC PAIN SYNDROME: ICD-10-CM

## 2018-02-15 PROCEDURE — 1123F ACP DISCUSS/DSCN MKR DOCD: CPT | Performed by: ANESTHESIOLOGY

## 2018-02-15 PROCEDURE — G8484 FLU IMMUNIZE NO ADMIN: HCPCS | Performed by: ANESTHESIOLOGY

## 2018-02-15 PROCEDURE — 3017F COLORECTAL CA SCREEN DOC REV: CPT | Performed by: ANESTHESIOLOGY

## 2018-02-15 PROCEDURE — 1090F PRES/ABSN URINE INCON ASSESS: CPT | Performed by: ANESTHESIOLOGY

## 2018-02-15 PROCEDURE — G8399 PT W/DXA RESULTS DOCUMENT: HCPCS | Performed by: ANESTHESIOLOGY

## 2018-02-15 PROCEDURE — G8420 CALC BMI NORM PARAMETERS: HCPCS | Performed by: ANESTHESIOLOGY

## 2018-02-15 PROCEDURE — 4040F PNEUMOC VAC/ADMIN/RCVD: CPT | Performed by: ANESTHESIOLOGY

## 2018-02-15 PROCEDURE — 99214 OFFICE O/P EST MOD 30 MIN: CPT | Performed by: ANESTHESIOLOGY

## 2018-02-15 PROCEDURE — 4004F PT TOBACCO SCREEN RCVD TLK: CPT | Performed by: ANESTHESIOLOGY

## 2018-02-15 PROCEDURE — G8427 DOCREV CUR MEDS BY ELIG CLIN: HCPCS | Performed by: ANESTHESIOLOGY

## 2018-02-15 RX ORDER — OXYCODONE AND ACETAMINOPHEN 10; 325 MG/1; MG/1
1 TABLET ORAL 3 TIMES DAILY PRN
Qty: 90 TABLET | Refills: 0 | Status: SHIPPED | OUTPATIENT
Start: 2018-02-15 | End: 2018-03-15 | Stop reason: DRUGHIGH

## 2018-02-15 ASSESSMENT — ENCOUNTER SYMPTOMS
SHORTNESS OF BREATH: 0
EYE REDNESS: 0
CONSTIPATION: 0
HEMOPTYSIS: 0
ORTHOPNEA: 0
BACK PAIN: 0
EYE PAIN: 0
NAUSEA: 0
ABDOMINAL PAIN: 0
HEARTBURN: 0
VOMITING: 0
EYE DISCHARGE: 0
WHEEZING: 0
COUGH: 0

## 2018-02-15 NOTE — PATIENT INSTRUCTIONS
1. Continue home exercises and use Percocet for severe pain. 2. Consider counseling in your PCP's office for the anxiety issues. 3. Follow with us after 1 month. Patient Education        Low Back Pain: Exercises  Your Care Instructions  Here are some examples of typical rehabilitation exercises for your condition. Start each exercise slowly. Ease off the exercise if you start to have pain. Your doctor or physical therapist will tell you when you can start these exercises and which ones will work best for you. How to do the exercises  Press-up    1. Lie on your stomach, supporting your body with your forearms. 2. Press your elbows down into the floor to raise your upper back. As you do this, relax your stomach muscles and allow your back to arch without using your back muscles. As your press up, do not let your hips or pelvis come off the floor. 3. Hold for 15 to 30 seconds, then relax. 4. Repeat 2 to 4 times. Alternate arm and leg (bird dog) exercise    Do this exercise slowly. Try to keep your body straight at all times, and do not let one hip drop lower than the other. 1. Start on the floor, on your hands and knees. 2. Tighten your belly muscles. 3. Raise one leg off the floor, and hold it straight out behind you. Be careful not to let your hip drop down, because that will twist your trunk. 4. Hold for about 6 seconds, then lower your leg and switch to the other leg. 5. Repeat 8 to 12 times on each leg. 6. Over time, work up to holding for 10 to 30 seconds each time. 7. If you feel stable and secure with your leg raised, try raising the opposite arm straight out in front of you at the same time. Knee-to-chest exercise    1. Lie on your back with your knees bent and your feet flat on the floor. 2. Bring one knee to your chest, keeping the other foot flat on the floor (or keeping the other leg straight, whichever feels better on your lower back). 3. Keep your lower back pressed to the floor. Hold for at least 15 to 30 seconds. 4. Relax, and lower the knee to the starting position. 5. Repeat with the other leg. Repeat 2 to 4 times with each leg. 6. To get more stretch, put your other leg flat on the floor while pulling your knee to your chest.  Curl-ups    1. Lie on the floor on your back with your knees bent at a 90-degree angle. Your feet should be flat on the floor, about 12 inches from your buttocks. 2. Cross your arms over your chest. If this bothers your neck, try putting your hands behind your neck (not your head), with your elbows spread apart. 3. Slowly tighten your belly muscles and raise your shoulder blades off the floor. 4. Keep your head in line with your body, and do not press your chin to your chest.  5. Hold this position for 1 or 2 seconds, then slowly lower yourself back down to the floor. 6. Repeat 8 to 12 times. Pelvic tilt exercise    1. Lie on your back with your knees bent. 2. \"Brace\" your stomach. This means to tighten your muscles by pulling in and imagining your belly button moving toward your spine. You should feel like your back is pressing to the floor and your hips and pelvis are rocking back. 3. Hold for about 6 seconds while you breathe smoothly. 4. Repeat 8 to 12 times. Heel dig bridging    1. Lie on your back with both knees bent and your ankles bent so that only your heels are digging into the floor. Your knees should be bent about 90 degrees. 2. Then push your heels into the floor, squeeze your buttocks, and lift your hips off the floor until your shoulders, hips, and knees are all in a straight line. 3. Hold for about 6 seconds as you continue to breathe normally, and then slowly lower your hips back down to the floor and rest for up to 10 seconds. 4. Do 8 to 12 repetitions. Hamstring stretch in doorway    1. Lie on your back in a doorway, with one leg through the open door. 2. Slide your leg up the wall to straighten your knee.  You should feel a gentle stretch down the back of your leg. 3. Hold the stretch for at least 15 to 30 seconds. Do not arch your back, point your toes, or bend either knee. Keep one heel touching the floor and the other heel touching the wall. 4. Repeat with your other leg. 5. Do 2 to 4 times for each leg. Hip flexor stretch    1. Kneel on the floor with one knee bent and one leg behind you. Place your forward knee over your foot. Keep your other knee touching the floor. 2. Slowly push your hips forward until you feel a stretch in the upper thigh of your rear leg. 3. Hold the stretch for at least 15 to 30 seconds. Repeat with your other leg. 4. Do 2 to 4 times on each side. Wall sit    1. Stand with your back 10 to 12 inches away from a wall. 2. Lean into the wall until your back is flat against it. 3. Slowly slide down until your knees are slightly bent, pressing your lower back into the wall. 4. Hold for about 6 seconds, then slide back up the wall. 5. Repeat 8 to 12 times. Follow-up care is a key part of your treatment and safety. Be sure to make and go to all appointments, and call your doctor if you are having problems. It's also a good idea to know your test results and keep a list of the medicines you take. Where can you learn more? Go to https://FIGHTER Interactivepeamaraeweb.Scopix. org and sign in to your [employer] account. Enter F853 in the GraphSQL box to learn more about \"Low Back Pain: Exercises. \"     If you do not have an account, please click on the \"Sign Up Now\" link. Current as of: March 21, 2017  Content Version: 11.5  © 0601-8411 seedchange. Care instructions adapted under license by [facility] (ChatAnd) [facility] ([facility]). If you have questions about a medical condition or this instruction, always ask your healthcare professional. [address] any warranty or liability for your use of this information.        Patient Education        Safe Use of Opioid Pain Medicine: Care you-and even save your life-if you take too much of an opioid. · Try other ways to reduce pain. ¨ Relax, and reduce stress. Relaxation techniques such as deep breathing or meditation can help. ¨ Keep moving. Gentle, daily exercise can help reduce pain over the long run. Try low- or no-impact exercises such as walking, swimming, and stationary biking. Do stretches to stay flexible. ¨ Try heat, cold packs, and massage. ¨ Get enough sleep. Pain can make you tired and drain your energy. Talk with your doctor if you have trouble sleeping because of pain. ¨ Think positive. Your thoughts can affect your pain level. Do things that you enjoy to distract yourself when you have pain instead of focusing on the pain. See a movie, read a book, listen to music, or spend time with a friend. · If you are not taking a prescription pain medicine, ask your doctor if you can take an over-the-counter medicine. When should you call for help? Call your doctor now or seek immediate medical care if:  ? · You have a new kind of pain. ? · You have new symptoms, such as a fever or rash, along with the pain. ? Watch closely for changes in your health, and be sure to contact your doctor if:  ? · You think you might be using too much pain medicine, and you need help to use less or stop. ? · Your pain gets worse. ? · You would like a referral to a doctor or clinic that specializes in pain management. Where can you learn more? Go to [email or web address]://Albert Medical Devicesrosette.[email or web address]. org and sign in to your [employer] account. Enter R108 in the [facility] box to learn more about \"Safe Use of Opioid Pain Medicine: Care Instructions. \"     If you do not have an account, please click on the \"Sign Up Now\" link. Current as of: October 14, 2016  Content Version: 11.5  © 4456-1360 Healthwise, [employer]. Care instructions adapted under license by [facility] ([facility]).  If you have questions about a medical condition or this instruction, always ask

## 2018-02-15 NOTE — PROGRESS NOTES
and weight loss. HENT: Negative for hearing loss and tinnitus. Eyes: Negative for pain, discharge and redness. Respiratory: Negative for cough, hemoptysis, shortness of breath and wheezing. Cardiovascular: Negative for chest pain, palpitations and orthopnea. Gastrointestinal: Negative for abdominal pain, constipation, heartburn, nausea and vomiting. Genitourinary: Negative for frequency, hematuria and urgency. Musculoskeletal: Negative for back pain, falls, joint pain, myalgias and neck pain. Skin: Negative for itching and rash. Neurological: Negative for dizziness, tingling, sensory change, focal weakness, seizures, weakness and headaches. Endo/Heme/Allergies: Does not bruise/bleed easily. Psychiatric/Behavioral: Negative for depression, substance abuse and suicidal ideas. The patient is not nervous/anxious and does not have insomnia. The patient was instructed to contact primary care physician regarding ROS positives not being addressed during today's visit. Physical Exam:   Physical Exam   Constitutional: She is oriented to person, place, and time. No distress. HENT:   Head: Normocephalic. Eyes: EOM are normal. Pupils are equal, round, and reactive to light. Neck: Normal range of motion. Neck supple. No thyromegaly present. Cardiovascular: Normal rate, regular rhythm, normal heart sounds and intact distal pulses. Pulmonary/Chest: Effort normal and breath sounds normal. No respiratory distress. She exhibits no tenderness. Abdominal: Soft. Bowel sounds are normal. She exhibits no mass. There is no tenderness. There is no guarding. Musculoskeletal: Normal range of motion. She exhibits no tenderness or deformity. Lymphadenopathy:     She has no cervical adenopathy. Neurological: She is alert and oriented to person, place, and time. She has normal strength and normal reflexes. No cranial nerve deficit or sensory deficit. She exhibits normal muscle tone. or illicit drugs: No     Appears intoxicated: No     Other: NA     Controlled Substances Monitoring:    Attestation: The Prescription Monitoring Report for this patient was reviewed today. Jay Gillespie MD)  Documentation: Possible medication side effects, risk of tolerance/dependence & alternative treatments discussed. , Random urine drug screen sent today., No signs of potential drug abuse or diversion identified. Jay Gillespie MD)    Based on an analysis of risks, benefits, and alternatives, utilization of prescription drugs is medically reasonable for the management of chronic pain. Overall Progress:       PEG Score = 7.5 / 10     Physical Therapy: Benefit 0% - last in 09/2017   Counseling: Benefit 0%   Injections: Benefit 0%     Patient compliance on plan of care:  Poor   Progress on current plan:  Fair    Assessment:    1. Spondylolisthesis of lumbosacral region  Chronic.  - oxyCODONE-acetaminophen (PERCOCET)  MG per tablet; Take 1 tablet by mouth 3 times daily as needed (for severe pain) for up to 30 days - Stop MS Contin. Earliest Fill Date: 2/15/18  Dispense: 90 tablet; Refill: 0    2. Chronic pain syndrome  Chronic. 3. Chronic, continuous use of opioids  - Drug Panel-PM-HI Res-UR Interp-A      Plan:     1. Chronic low back pain with tingling in legs; no acute changes. 2. Failed PT and multiple medications (including NSAIDs, neuropathic medications). 3. Encouraged her to see [facility] counselor in PCP's office - she declines at this time. 4. Failed spinal injections and was not offered surgery; scheduled to see [facility] for possible \"pain pump\". 5. Reports fair help from MS Contin, but wishes to switch back to Percocet, since it helped \"better\". 6. Counseled on limitation of opioids; will provide Percocet 10 mg TID PRN; will get UDS as well - re-assess next month.      Analgesic Plan:   Continue present regimen: No   Adjust dose of present analgesic: No   Switch analgesics: Yes - switch

## 2018-02-16 ENCOUNTER — OFFICE VISIT (OUTPATIENT)
Dept: INTERNAL MEDICINE | Age: 78
End: 2018-02-16

## 2018-02-16 VITALS
OXYGEN SATURATION: 99 % | DIASTOLIC BLOOD PRESSURE: 60 MMHG | HEIGHT: 65 IN | WEIGHT: 132 LBS | HEART RATE: 83 BPM | BODY MASS INDEX: 21.99 KG/M2 | SYSTOLIC BLOOD PRESSURE: 110 MMHG

## 2018-02-16 DIAGNOSIS — R60.0 EDEMA OF BOTH LEGS: ICD-10-CM

## 2018-02-16 DIAGNOSIS — I10 ESSENTIAL HYPERTENSION: Primary | ICD-10-CM

## 2018-02-16 DIAGNOSIS — R01.1 MURMUR: ICD-10-CM

## 2018-02-16 DIAGNOSIS — M81.8 OTHER OSTEOPOROSIS WITHOUT CURRENT PATHOLOGICAL FRACTURE: ICD-10-CM

## 2018-02-16 DIAGNOSIS — G89.29 CHRONIC BILATERAL LOW BACK PAIN WITHOUT SCIATICA: ICD-10-CM

## 2018-02-16 DIAGNOSIS — M54.50 CHRONIC BILATERAL LOW BACK PAIN WITHOUT SCIATICA: ICD-10-CM

## 2018-02-16 PROCEDURE — G8420 CALC BMI NORM PARAMETERS: HCPCS | Performed by: INTERNAL MEDICINE

## 2018-02-16 PROCEDURE — 4040F PNEUMOC VAC/ADMIN/RCVD: CPT | Performed by: INTERNAL MEDICINE

## 2018-02-16 PROCEDURE — 99214 OFFICE O/P EST MOD 30 MIN: CPT | Performed by: INTERNAL MEDICINE

## 2018-02-16 PROCEDURE — 3017F COLORECTAL CA SCREEN DOC REV: CPT | Performed by: INTERNAL MEDICINE

## 2018-02-16 PROCEDURE — G8399 PT W/DXA RESULTS DOCUMENT: HCPCS | Performed by: INTERNAL MEDICINE

## 2018-02-16 PROCEDURE — 1123F ACP DISCUSS/DSCN MKR DOCD: CPT | Performed by: INTERNAL MEDICINE

## 2018-02-16 PROCEDURE — G8427 DOCREV CUR MEDS BY ELIG CLIN: HCPCS | Performed by: INTERNAL MEDICINE

## 2018-02-16 PROCEDURE — G8484 FLU IMMUNIZE NO ADMIN: HCPCS | Performed by: INTERNAL MEDICINE

## 2018-02-16 PROCEDURE — 1090F PRES/ABSN URINE INCON ASSESS: CPT | Performed by: INTERNAL MEDICINE

## 2018-02-16 PROCEDURE — 4004F PT TOBACCO SCREEN RCVD TLK: CPT | Performed by: INTERNAL MEDICINE

## 2018-02-16 RX ORDER — POTASSIUM CHLORIDE 20 MEQ/1
20 TABLET, EXTENDED RELEASE ORAL DAILY
Qty: 90 TABLET | Refills: 3 | Status: SHIPPED | OUTPATIENT
Start: 2018-02-16 | End: 2018-02-16 | Stop reason: SDUPTHER

## 2018-02-16 RX ORDER — FUROSEMIDE 40 MG/1
40 TABLET ORAL DAILY
Qty: 90 TABLET | Refills: 3 | Status: SHIPPED | OUTPATIENT
Start: 2018-02-16 | End: 2019-02-13

## 2018-02-16 RX ORDER — POTASSIUM CHLORIDE 20 MEQ/1
20 TABLET, EXTENDED RELEASE ORAL DAILY
Qty: 90 TABLET | Refills: 3 | Status: SHIPPED | OUTPATIENT
Start: 2018-02-16 | End: 2019-02-13

## 2018-02-16 RX ORDER — FUROSEMIDE 40 MG/1
40 TABLET ORAL DAILY
Qty: 90 TABLET | Refills: 3 | Status: SHIPPED | OUTPATIENT
Start: 2018-02-16 | End: 2018-02-16 | Stop reason: SDUPTHER

## 2018-02-16 ASSESSMENT — ENCOUNTER SYMPTOMS
ABDOMINAL PAIN: 1
SHORTNESS OF BREATH: 0
BACK PAIN: 1

## 2018-02-16 NOTE — PROGRESS NOTES
SUBJECTIVE:    Patient ID: Dennie Haus is an 78 y.o. female. HPI: Patient here today for the f/u of chronic problems -- see Problem List and associated comments. New issues or complaints include (also see Assessment for more details):  ER follow-up for swelling in bilateral legs. ER evaluation otherwise unremarkable. Patient without any new breathing problems. No chest pain or palpitations. Chronic problems all stable. Also diagnosis of osteoporosis. Review of Systems   Respiratory: Negative for shortness of breath. Cardiovascular: Positive for leg swelling. Negative for chest pain and palpitations. Gastrointestinal: Positive for abdominal pain. Genitourinary: Negative for decreased urine volume and difficulty urinating. Musculoskeletal: Positive for back pain. OBJECTIVE:    /60 (Site: Left Arm, Position: Sitting, Cuff Size: Medium Adult)   Pulse 83   Ht 5' 5\" (1.651 m)   Wt 132 lb (59.9 kg)   SpO2 99%   BMI 21.97 kg/m²      Physical Exam   Constitutional: She is oriented to person, place, and time. She appears well-developed and well-nourished. No distress. Using wheeled walker   Neck: No JVD present. Cardiovascular: Normal rate, regular rhythm, normal heart sounds and intact distal pulses. Exam reveals no gallop. No murmur heard. Pulmonary/Chest: Effort normal. No respiratory distress. Abdominal: Soft. Bowel sounds are normal.   Musculoskeletal: She exhibits edema ( bilateral ankles trace). Flexed at waist.   Neurological: She is alert and oriented to person, place, and time. No cranial nerve deficit. Skin: She is not diaphoretic. No pallor. Psychiatric: Her behavior is normal. Judgment and thought content normal.       ASSESSMENT:       Encounter Diagnoses   Name Primary?     Essential hypertension Yes    Edema of both legs     Murmur     Other osteoporosis without current pathological fracture     Chronic bilateral low back pain without sciatica        Edema

## 2018-02-16 NOTE — ASSESSMENT & PLAN NOTE
New onset 2 weeks ago. No etiology. Evaluated at [facility] ER. Labs and chest x-ray okay. Responded to Lasix. We'll keep continue Lasix and may try just on a when necessary basis. Take potassium when she takes Lasix.  Check echocardiogram.

## 2018-02-19 ENCOUNTER — TELEPHONE (OUTPATIENT)
Dept: INTERNAL MEDICINE | Age: 78
End: 2018-02-19

## 2018-02-20 LAB
6-ACETYLMORPHINE: NOT DETECTED
7-AMINOCLONAZEPAM: NOT DETECTED
ALPHA-OH-ALPRAZOLAM: PRESENT
ALPRAZOLAM: PRESENT
AMPHETAMINE: NOT DETECTED
BARBITURATES: NOT DETECTED
BENZOYLECGONINE: NOT DETECTED
BUPRENORPHINE: NOT DETECTED
CARISOPRODOL: NOT DETECTED
CLONAZEPAM: NOT DETECTED
CODEINE: NOT DETECTED
CREATININE URINE: 75.3 MG/DL (ref 20–400)
DIAZEPAM: NOT DETECTED
DRUGS EXPECTED: NORMAL
EER PAIN MGT DRUG PANEL, HIGH RES/EMIT U: NORMAL
ETHYL GLUCURONIDE: PRESENT
FENTANYL: NOT DETECTED
HYDROCODONE: NOT DETECTED
HYDROMORPHONE: NOT DETECTED
LORAZEPAM: NOT DETECTED
MARIJUANA METABOLITE: NOT DETECTED
MDA: NOT DETECTED
MDEA: NOT DETECTED
MDMA URINE: NOT DETECTED
MEPERIDINE: NOT DETECTED
METHADONE: NOT DETECTED
METHAMPHETAMINE: NOT DETECTED
METHYLPHENIDATE: NOT DETECTED
MIDAZOLAM: NOT DETECTED
MORPHINE: PRESENT
NORBUPRENORPHINE, FREE: NOT DETECTED
NORDIAZEPAM: NOT DETECTED
NORFENTANYL: NOT DETECTED
NORHYDROCODONE, URINE: NOT DETECTED
NOROXYCODONE: NOT DETECTED
NOROXYMORPHONE, URINE: NOT DETECTED
OXAZEPAM: NOT DETECTED
OXYCODONE: NOT DETECTED
OXYMORPHONE: NOT DETECTED
PAIN MANAGEMENT DRUG PANEL: NORMAL
PAIN MANAGEMENT DRUG PANEL: NORMAL
PCP: NOT DETECTED
PHENTERMINE: NOT DETECTED
PROPOXYPHENE: NOT DETECTED
TAPENTADOL, URINE: NOT DETECTED
TAPENTADOL-O-SULFATE, URINE: NOT DETECTED
TEMAZEPAM: NOT DETECTED
TRAMADOL: NOT DETECTED
ZOLPIDEM: NOT DETECTED

## 2018-03-12 ENCOUNTER — TELEPHONE (OUTPATIENT)
Dept: INTERNAL MEDICINE | Age: 78
End: 2018-03-12

## 2018-03-12 RX ORDER — VENLAFAXINE HYDROCHLORIDE 75 MG/1
CAPSULE, EXTENDED RELEASE ORAL
Qty: 30 CAPSULE | Refills: 0 | Status: SHIPPED | OUTPATIENT
Start: 2018-03-12 | End: 2018-03-14 | Stop reason: SDUPTHER

## 2018-03-12 NOTE — TELEPHONE ENCOUNTER
Pt would like to be called with the results of an ECHO that Dr. Hale Friend ordered for her. She had the E[place] done [place] [facility]. The results are scanned into Epci and attached to the original order. Please call.

## 2018-03-12 NOTE — TELEPHONE ENCOUNTER
Echocardiogram looks good. Good heart strength. Heart is a little bit stiff and doesn't fully relax between beats but overall it is pretty good.

## 2018-03-14 ENCOUNTER — TELEPHONE (OUTPATIENT)
Dept: INTERNAL MEDICINE | Age: 78
End: 2018-03-14

## 2018-03-14 RX ORDER — VENLAFAXINE HYDROCHLORIDE 75 MG/1
CAPSULE, EXTENDED RELEASE ORAL
Qty: 20 CAPSULE | Refills: 0 | Status: SHIPPED | OUTPATIENT
Start: 2018-03-14 | End: 2018-05-05 | Stop reason: SDUPTHER

## 2018-03-15 ENCOUNTER — OFFICE VISIT (OUTPATIENT)
Dept: PAIN MANAGEMENT | Age: 78
End: 2018-03-15

## 2018-03-15 VITALS
BODY MASS INDEX: 22.66 KG/M2 | HEART RATE: 91 BPM | SYSTOLIC BLOOD PRESSURE: 141 MMHG | HEIGHT: 65 IN | DIASTOLIC BLOOD PRESSURE: 94 MMHG | WEIGHT: 136 LBS

## 2018-03-15 DIAGNOSIS — F11.90 CHRONIC, CONTINUOUS USE OF OPIOIDS: ICD-10-CM

## 2018-03-15 DIAGNOSIS — M43.17 SPONDYLOLISTHESIS OF LUMBOSACRAL REGION: Primary | ICD-10-CM

## 2018-03-15 DIAGNOSIS — M48.062 SPINAL STENOSIS OF LUMBAR REGION WITH NEUROGENIC CLAUDICATION: ICD-10-CM

## 2018-03-15 PROCEDURE — 99213 OFFICE O/P EST LOW 20 MIN: CPT | Performed by: ANESTHESIOLOGY

## 2018-03-15 PROCEDURE — G8484 FLU IMMUNIZE NO ADMIN: HCPCS | Performed by: ANESTHESIOLOGY

## 2018-03-15 PROCEDURE — 1123F ACP DISCUSS/DSCN MKR DOCD: CPT | Performed by: ANESTHESIOLOGY

## 2018-03-15 PROCEDURE — G8399 PT W/DXA RESULTS DOCUMENT: HCPCS | Performed by: ANESTHESIOLOGY

## 2018-03-15 PROCEDURE — 4040F PNEUMOC VAC/ADMIN/RCVD: CPT | Performed by: ANESTHESIOLOGY

## 2018-03-15 PROCEDURE — 4004F PT TOBACCO SCREEN RCVD TLK: CPT | Performed by: ANESTHESIOLOGY

## 2018-03-15 PROCEDURE — G8420 CALC BMI NORM PARAMETERS: HCPCS | Performed by: ANESTHESIOLOGY

## 2018-03-15 PROCEDURE — 1090F PRES/ABSN URINE INCON ASSESS: CPT | Performed by: ANESTHESIOLOGY

## 2018-03-15 PROCEDURE — G8427 DOCREV CUR MEDS BY ELIG CLIN: HCPCS | Performed by: ANESTHESIOLOGY

## 2018-03-15 PROCEDURE — 3017F COLORECTAL CA SCREEN DOC REV: CPT | Performed by: ANESTHESIOLOGY

## 2018-03-15 RX ORDER — OXYCODONE AND ACETAMINOPHEN 7.5; 325 MG/1; MG/1
1 TABLET ORAL EVERY 8 HOURS PRN
Qty: 90 TABLET | Refills: 0 | Status: SHIPPED | OUTPATIENT
Start: 2018-03-17 | End: 2018-04-12 | Stop reason: SDUPTHER

## 2018-03-15 ASSESSMENT — ENCOUNTER SYMPTOMS
ABDOMINAL PAIN: 0
ORTHOPNEA: 0
EYE PAIN: 0
CONSTIPATION: 0
NAUSEA: 0
HEMOPTYSIS: 0
BACK PAIN: 1
HEARTBURN: 0
WHEEZING: 0
SHORTNESS OF BREATH: 0
VOMITING: 0
EYE REDNESS: 0
COUGH: 0
EYE DISCHARGE: 0

## 2018-03-15 NOTE — PROGRESS NOTES
[facility]  [address], [place], [address]  [phone] (G)  [phone] (f)    3/15/18        Daniele July 1940      Tabatha Garcia MD      Chief Complaint:   Chief Complaint   Patient presents with    Lower Back Pain     4 Week follow up of Lumbar pain radiatating down the left leg. History of Present Illness   HPI    Seen us since 06/2017 -  Chronic low back pain for >40 years; on chronic opioids for years. No help from [facility] (12/2017) and no surgery offered (Dr. Phyllis Bhakta). Pain medication helps without much improvement in function. Pain constant in lower back, achy, sharp in nature with radiation to the left leg; mild weakness in leg; no incontinence. Pain worse with daily activities, standing, walking and helped somewhat by pain medication. RED FLAG symptoms (Symptoms may include, but not limited to, acute trauma, fever, drug use, malignancy, weakness, perianal numbness, bladder/bowel changes) since last visit - No    Changes since last visit:   Seen Rheum (Dr Sabino Collazo). Scheduled to see [address] [place] [address] for possible intrathecal pump. Past Medical History:   Diagnosis Date    Anxiety     Colon polyps     check q5yrs    HTN (hypertension) 10/11    Hyperlipidemia     Proteinuria 10/11    Screening mammogram 2010    benign       Past Surgical History:   Procedure Laterality Date    COLONOSCOPY  2010    Dr. Marissa Rosario - recheck 5 yrs    ERCP  5/8/2015    sphincter and stent    WRIST FRACTURE SURGERY Left 12/23/2016    OPEN REDUCTION INTERNAL FIXATION LEFT DISTAL RADIUS FRACTURE       Allergies   Allergen Reactions    Penicillins Swelling     Swelling in vaginal area only  Vaginal swelling       Current Outpatient Prescriptions   Medication Sig Dispense Refill    [START ON 3/17/2018] oxyCODONE-acetaminophen (PERCOCET) 7.5-325 MG per tablet Take 1 tablet by mouth every 8 hours as needed for Pain for up to 30 days.  Earliest Fill Date: 3/17/18 90 studies were personally reviewed by me with the patient in detail along with the images, when available. The patient was instructed to contact the primary care provider regarding other unrelated findings not addressed during today's visit. Review of Systems:   Review of Systems   Constitutional: Negative for chills, fever, malaise/fatigue and weight loss. HENT: Negative for hearing loss and tinnitus. Eyes: Negative for pain, discharge and redness. Respiratory: Negative for cough, hemoptysis, shortness of breath and wheezing. Cardiovascular: Negative for chest pain, palpitations and orthopnea. Gastrointestinal: Negative for abdominal pain, constipation, heartburn, nausea and vomiting. Genitourinary: Negative for frequency, hematuria and urgency. Musculoskeletal: Positive for back pain. Negative for falls, joint pain, myalgias and neck pain. Skin: Negative for itching and rash. Neurological: Positive for tingling and sensory change. Negative for dizziness, focal weakness, seizures, weakness and headaches. Endo/Heme/Allergies: Does not bruise/bleed easily. Psychiatric/Behavioral: Negative for depression, substance abuse and suicidal ideas. The patient is not nervous/anxious and does not have insomnia. The patient was instructed to contact primary care physician regarding ROS positives not being addressed during today's visit. Physical Exam:   Physical Exam   Constitutional: She is oriented to person, place, and time. No distress. Cheerful today, ambulates with cane   HENT:   Head: Normocephalic. Eyes: EOM are normal. Pupils are equal, round, and reactive to light. Neck: Normal range of motion. Neck supple. No thyromegaly present. Cardiovascular: Normal rate, regular rhythm, normal heart sounds and intact distal pulses. Pulmonary/Chest: Effort normal and breath sounds normal. No respiratory distress. She exhibits no tenderness. Abdominal: Soft.  Bowel sounds are normal. She exhibits no mass. There is no tenderness. There is no guarding. Musculoskeletal: Normal range of motion. She exhibits tenderness. She exhibits no deformity. Mild to moderate lumbosacral paraspinal tenderness present. Lymphadenopathy:     She has no cervical adenopathy. Neurological: She is alert and oriented to person, place, and time. She has normal strength. No cranial nerve deficit or sensory deficit. She exhibits abnormal muscle tone (mild left foot weakness on dorsiflexion). Gait (compensated) abnormal. Coordination normal. She displays no Babinski's sign on the right side. She displays no Babinski's sign on the left side. Reflex Scores:       Tricep reflexes are 2+ on the right side and 2+ on the left side. Bicep reflexes are 2+ on the right side and 2+ on the left side. Brachioradialis reflexes are 2+ on the right side and 2+ on the left side. Patellar reflexes are 1+ on the right side and 1+ on the left side. Achilles reflexes are 1+ on the right side and 1+ on the left side. Skin: Skin is warm. No rash noted. She is not diaphoretic. Psychiatric: She has a normal mood and affect. Her behavior is normal. Thought content normal.       Vitals:    03/15/18 1437 03/15/18 1438   BP: (!) 148/91 (!) 141/94   Site: Right Arm    Position: Sitting    Cuff Size: Medium Adult    Pulse: 91    Weight: 136 lb (61.7 kg)    Height: 5' 5\" (1.651 m)        Body mass index is 22.63 kg/m². Pain Score:   9 /10    Goals of chronic pain therapy:      Multi-disciplinary comprehensive pain management with functional improvement and reduced opioid use. Prescription pain medication monitoring:      MEDD current = 45   ORT Score =  2   Other Risk factors - Anxiety, concurrent use of benzo, advanced age.       OARRS:    Checked today: Yes    Adverse report: No   UDS:    Date of last UDS: 02/15/2018    Adverse report: Yes (see below)      Medication Effects -        Analgesia:      Average level

## 2018-03-15 NOTE — PATIENT INSTRUCTIONS
drug interactions or your safety, or if he or she had other concerns. It is best to have one doctor or clinic treat your pain. This way you will get the pain medicine that will help you the most. And a doctor will be able to watch for any problems that the medicine might cause. The doctor has checked you carefully, but problems can develop later. If you notice any problems or new symptoms,  get medical treatment right away. Follow-up care is a key part of your treatment and safety. Be sure to make and go to all appointments, and call your doctor if you are having problems. It's also a good idea to know your test results and keep a list of the medicines you take. How can you care for yourself at home? · If you need to take opioids to manage your pain, remember these safety tips. ¨ Follow directions carefully. It's easy to misuse opioids if you take a dose other than what's prescribed by your doctor. This can lead to overdose and even death. Even sharing them with someone they weren't meant for is misuse. ¨ Be cautious. Opioids may affect your judgment and decision making. Do not drive or operate machinery until you can think clearly. Talk with your doctor about when it is safe to drive. ¨ Reduce the risk of drug interactions. Opioids can be dangerous if you take them with alcohol or with certain drugs like sleeping pills and muscle relaxers. Make sure your doctor knows about all the other medicines you take, including over-the-counter medicines. Don't start any new medicines before you talk to your doctor or pharmacist.  Annel Narrative Keep others safe. Store opioids in a safe and secure place. Make sure that pets, children, friends, and family can't get to them. When you're done using opioids, make sure to properly dispose of them.  You can either use a community drug take-back program or your drugstore's mail-back program. If one of these programs isn't available, you can flush opioid skin patches and unused opioid pills

## 2018-03-23 DIAGNOSIS — F41.9 ANXIETY AND DEPRESSION: ICD-10-CM

## 2018-03-23 DIAGNOSIS — F32.A ANXIETY AND DEPRESSION: ICD-10-CM

## 2018-03-23 RX ORDER — ALPRAZOLAM 0.5 MG/1
TABLET ORAL
Qty: 90 TABLET | Refills: 0 | OUTPATIENT
Start: 2018-03-23 | End: 2018-04-22

## 2018-04-12 DIAGNOSIS — M48.062 SPINAL STENOSIS OF LUMBAR REGION WITH NEUROGENIC CLAUDICATION: ICD-10-CM

## 2018-04-12 DIAGNOSIS — M43.17 SPONDYLOLISTHESIS OF LUMBOSACRAL REGION: ICD-10-CM

## 2018-04-12 RX ORDER — OXYCODONE AND ACETAMINOPHEN 7.5; 325 MG/1; MG/1
1 TABLET ORAL EVERY 8 HOURS PRN
Qty: 90 TABLET | Refills: 0 | Status: SHIPPED | OUTPATIENT
Start: 2018-04-16 | End: 2018-05-15 | Stop reason: DRUGHIGH

## 2018-04-23 RX ORDER — ALPRAZOLAM 0.5 MG/1
TABLET ORAL
Qty: 90 TABLET | Refills: 0 | OUTPATIENT
Start: 2018-04-23 | End: 2018-05-05 | Stop reason: SDUPTHER

## 2018-05-07 RX ORDER — VENLAFAXINE HYDROCHLORIDE 75 MG/1
CAPSULE, EXTENDED RELEASE ORAL
Qty: 30 CAPSULE | Refills: 3 | Status: SHIPPED | OUTPATIENT
Start: 2018-05-07 | End: 2018-05-16 | Stop reason: SDUPTHER

## 2018-05-07 RX ORDER — ALPRAZOLAM 0.5 MG/1
TABLET ORAL
Qty: 30 TABLET | Refills: 0 | OUTPATIENT
Start: 2018-05-07 | End: 2018-05-16 | Stop reason: SDUPTHER

## 2018-05-15 ENCOUNTER — OFFICE VISIT (OUTPATIENT)
Dept: PAIN MANAGEMENT | Age: 78
End: 2018-05-15

## 2018-05-15 VITALS
SYSTOLIC BLOOD PRESSURE: 147 MMHG | HEIGHT: 65 IN | WEIGHT: 132 LBS | BODY MASS INDEX: 21.99 KG/M2 | HEART RATE: 65 BPM | DIASTOLIC BLOOD PRESSURE: 74 MMHG

## 2018-05-15 DIAGNOSIS — M43.16 SPONDYLOLISTHESIS OF LUMBAR REGION: Primary | ICD-10-CM

## 2018-05-15 DIAGNOSIS — M54.16 LUMBAR RADICULOPATHY: ICD-10-CM

## 2018-05-15 DIAGNOSIS — F11.90 CHRONIC, CONTINUOUS USE OF OPIOIDS: ICD-10-CM

## 2018-05-15 PROCEDURE — G8420 CALC BMI NORM PARAMETERS: HCPCS | Performed by: ANESTHESIOLOGY

## 2018-05-15 PROCEDURE — 1090F PRES/ABSN URINE INCON ASSESS: CPT | Performed by: ANESTHESIOLOGY

## 2018-05-15 PROCEDURE — 4040F PNEUMOC VAC/ADMIN/RCVD: CPT | Performed by: ANESTHESIOLOGY

## 2018-05-15 PROCEDURE — 99214 OFFICE O/P EST MOD 30 MIN: CPT | Performed by: ANESTHESIOLOGY

## 2018-05-15 PROCEDURE — G8399 PT W/DXA RESULTS DOCUMENT: HCPCS | Performed by: ANESTHESIOLOGY

## 2018-05-15 PROCEDURE — 1123F ACP DISCUSS/DSCN MKR DOCD: CPT | Performed by: ANESTHESIOLOGY

## 2018-05-15 PROCEDURE — 3017F COLORECTAL CA SCREEN DOC REV: CPT | Performed by: ANESTHESIOLOGY

## 2018-05-15 PROCEDURE — 4004F PT TOBACCO SCREEN RCVD TLK: CPT | Performed by: ANESTHESIOLOGY

## 2018-05-15 PROCEDURE — G8427 DOCREV CUR MEDS BY ELIG CLIN: HCPCS | Performed by: ANESTHESIOLOGY

## 2018-05-15 RX ORDER — OXYCODONE AND ACETAMINOPHEN 10; 325 MG/1; MG/1
1 TABLET ORAL EVERY 8 HOURS PRN
Qty: 90 TABLET | Refills: 0 | Status: SHIPPED | OUTPATIENT
Start: 2018-05-15 | End: 2018-06-12 | Stop reason: SDUPTHER

## 2018-05-15 ASSESSMENT — ENCOUNTER SYMPTOMS
WHEEZING: 0
COUGH: 0
ORTHOPNEA: 0
ABDOMINAL PAIN: 0
BACK PAIN: 1
SHORTNESS OF BREATH: 0
VOMITING: 0
EYE PAIN: 0
HEARTBURN: 0
EYE DISCHARGE: 0
EYE REDNESS: 0
HEMOPTYSIS: 0
NAUSEA: 0
CONSTIPATION: 0

## 2018-05-16 ENCOUNTER — OFFICE VISIT (OUTPATIENT)
Dept: INTERNAL MEDICINE | Age: 78
End: 2018-05-16

## 2018-05-16 VITALS
HEIGHT: 65 IN | BODY MASS INDEX: 23.16 KG/M2 | SYSTOLIC BLOOD PRESSURE: 156 MMHG | HEART RATE: 67 BPM | DIASTOLIC BLOOD PRESSURE: 80 MMHG | WEIGHT: 139 LBS | OXYGEN SATURATION: 97 %

## 2018-05-16 DIAGNOSIS — I10 ESSENTIAL HYPERTENSION: ICD-10-CM

## 2018-05-16 DIAGNOSIS — R60.0 EDEMA OF BOTH LEGS: ICD-10-CM

## 2018-05-16 DIAGNOSIS — F41.9 ANXIETY AND DEPRESSION: Primary | ICD-10-CM

## 2018-05-16 DIAGNOSIS — J43.8 OTHER EMPHYSEMA (HCC): ICD-10-CM

## 2018-05-16 DIAGNOSIS — F17.210 CIGARETTE SMOKER: ICD-10-CM

## 2018-05-16 DIAGNOSIS — F32.A ANXIETY AND DEPRESSION: Primary | ICD-10-CM

## 2018-05-16 DIAGNOSIS — F11.90 CHRONIC, CONTINUOUS USE OF OPIOIDS: ICD-10-CM

## 2018-05-16 PROCEDURE — 99214 OFFICE O/P EST MOD 30 MIN: CPT | Performed by: INTERNAL MEDICINE

## 2018-05-16 PROCEDURE — 4004F PT TOBACCO SCREEN RCVD TLK: CPT | Performed by: INTERNAL MEDICINE

## 2018-05-16 PROCEDURE — G8399 PT W/DXA RESULTS DOCUMENT: HCPCS | Performed by: INTERNAL MEDICINE

## 2018-05-16 PROCEDURE — G8926 SPIRO NO PERF OR DOC: HCPCS | Performed by: INTERNAL MEDICINE

## 2018-05-16 PROCEDURE — 4040F PNEUMOC VAC/ADMIN/RCVD: CPT | Performed by: INTERNAL MEDICINE

## 2018-05-16 PROCEDURE — 1123F ACP DISCUSS/DSCN MKR DOCD: CPT | Performed by: INTERNAL MEDICINE

## 2018-05-16 PROCEDURE — G8427 DOCREV CUR MEDS BY ELIG CLIN: HCPCS | Performed by: INTERNAL MEDICINE

## 2018-05-16 PROCEDURE — 3023F SPIROM DOC REV: CPT | Performed by: INTERNAL MEDICINE

## 2018-05-16 PROCEDURE — 3017F COLORECTAL CA SCREEN DOC REV: CPT | Performed by: INTERNAL MEDICINE

## 2018-05-16 PROCEDURE — G8420 CALC BMI NORM PARAMETERS: HCPCS | Performed by: INTERNAL MEDICINE

## 2018-05-16 PROCEDURE — 1090F PRES/ABSN URINE INCON ASSESS: CPT | Performed by: INTERNAL MEDICINE

## 2018-05-16 RX ORDER — VENLAFAXINE HYDROCHLORIDE 75 MG/1
CAPSULE, EXTENDED RELEASE ORAL
Qty: 30 CAPSULE | Refills: 5 | Status: SHIPPED | OUTPATIENT
Start: 2018-05-16 | End: 2019-04-11 | Stop reason: SDUPTHER

## 2018-05-16 RX ORDER — ALPRAZOLAM 0.5 MG/1
TABLET ORAL
Qty: 90 TABLET | Refills: 2 | Status: SHIPPED | OUTPATIENT
Start: 2018-05-16 | End: 2018-08-16

## 2018-05-16 ASSESSMENT — ENCOUNTER SYMPTOMS
WHEEZING: 0
GASTROINTESTINAL NEGATIVE: 1
SHORTNESS OF BREATH: 0
BACK PAIN: 1

## 2018-05-21 ENCOUNTER — TELEPHONE (OUTPATIENT)
Dept: INTERNAL MEDICINE | Age: 78
End: 2018-05-21

## 2018-05-21 DIAGNOSIS — R10.9 CHRONIC RIGHT FLANK PAIN: Primary | ICD-10-CM

## 2018-05-21 DIAGNOSIS — G89.29 CHRONIC RIGHT FLANK PAIN: Primary | ICD-10-CM

## 2018-06-12 DIAGNOSIS — M43.16 SPONDYLOLISTHESIS OF LUMBAR REGION: ICD-10-CM

## 2018-06-12 DIAGNOSIS — M54.16 LUMBAR RADICULOPATHY: ICD-10-CM

## 2018-06-12 RX ORDER — OXYCODONE AND ACETAMINOPHEN 10; 325 MG/1; MG/1
1 TABLET ORAL EVERY 8 HOURS PRN
Qty: 90 TABLET | Refills: 0 | Status: SHIPPED | OUTPATIENT
Start: 2018-06-14 | End: 2018-07-14

## 2018-07-13 ENCOUNTER — TELEPHONE (OUTPATIENT)
Dept: PAIN MANAGEMENT | Age: 78
End: 2018-07-13

## 2018-07-13 NOTE — TELEPHONE ENCOUNTER
I called the patient to let her know that Dr. Eliu Brower is cancelling her appointment today because she is seeing another PM&R doctor. She is no longer to be seen here. I called both numbers on chart; one number no answer or voice mail available. The second number left message to call back.

## 2018-07-13 NOTE — TELEPHONE ENCOUNTER
OARRS and chart reviewed -  Patient currently following with another provider for medications. May cancel visit and send termination letter.

## 2018-08-20 ENCOUNTER — OFFICE VISIT (OUTPATIENT)
Dept: INTERNAL MEDICINE | Age: 78
End: 2018-08-20

## 2018-08-20 VITALS
DIASTOLIC BLOOD PRESSURE: 70 MMHG | WEIGHT: 140 LBS | HEIGHT: 65 IN | BODY MASS INDEX: 23.32 KG/M2 | SYSTOLIC BLOOD PRESSURE: 142 MMHG | HEART RATE: 78 BPM | OXYGEN SATURATION: 95 %

## 2018-08-20 DIAGNOSIS — Z23 NEED FOR PNEUMOCOCCAL VACCINATION: Primary | ICD-10-CM

## 2018-08-20 DIAGNOSIS — I10 ESSENTIAL HYPERTENSION: ICD-10-CM

## 2018-08-20 DIAGNOSIS — E04.1 THYROID NODULE: ICD-10-CM

## 2018-08-20 DIAGNOSIS — E78.49 OTHER HYPERLIPIDEMIA: ICD-10-CM

## 2018-08-20 DIAGNOSIS — F41.9 ANXIETY AND DEPRESSION: ICD-10-CM

## 2018-08-20 DIAGNOSIS — F32.A ANXIETY AND DEPRESSION: ICD-10-CM

## 2018-08-20 DIAGNOSIS — M43.16 SPONDYLOLISTHESIS OF LUMBAR REGION: ICD-10-CM

## 2018-08-20 DIAGNOSIS — G89.29 CHRONIC BILATERAL LOW BACK PAIN WITHOUT SCIATICA: ICD-10-CM

## 2018-08-20 DIAGNOSIS — M54.50 CHRONIC BILATERAL LOW BACK PAIN WITHOUT SCIATICA: ICD-10-CM

## 2018-08-20 DIAGNOSIS — J43.8 OTHER EMPHYSEMA (HCC): ICD-10-CM

## 2018-08-20 DIAGNOSIS — Z00.00 PREVENTATIVE HEALTH CARE: ICD-10-CM

## 2018-08-20 DIAGNOSIS — M54.16 LUMBAR RADICULOPATHY: ICD-10-CM

## 2018-08-20 DIAGNOSIS — F17.210 CIGARETTE SMOKER: ICD-10-CM

## 2018-08-20 PROBLEM — M41.25 IDIOPATHIC SCOLIOSIS OF THORACOLUMBAR SPINE: Status: RESOLVED | Noted: 2017-06-21 | Resolved: 2018-08-20

## 2018-08-20 PROBLEM — G89.4 CHRONIC PAIN SYNDROME: Status: RESOLVED | Noted: 2017-06-21 | Resolved: 2018-08-20

## 2018-08-20 PROBLEM — M25.552 LEFT HIP PAIN: Status: RESOLVED | Noted: 2017-06-06 | Resolved: 2018-08-20

## 2018-08-20 LAB
BASOPHILS ABSOLUTE: 0 K/UL (ref 0–0.2)
BASOPHILS RELATIVE PERCENT: 0.4 %
EOSINOPHILS ABSOLUTE: 0.1 K/UL (ref 0–0.6)
EOSINOPHILS RELATIVE PERCENT: 2.2 %
HCT VFR BLD CALC: 33.6 % (ref 36–48)
HEMOGLOBIN: 11.3 G/DL (ref 12–16)
LYMPHOCYTES ABSOLUTE: 1.2 K/UL (ref 1–5.1)
LYMPHOCYTES RELATIVE PERCENT: 18.8 %
MCH RBC QN AUTO: 32.7 PG (ref 26–34)
MCHC RBC AUTO-ENTMCNC: 33.5 G/DL (ref 31–36)
MCV RBC AUTO: 97.5 FL (ref 80–100)
MONOCYTES ABSOLUTE: 0.6 K/UL (ref 0–1.3)
MONOCYTES RELATIVE PERCENT: 9.9 %
NEUTROPHILS ABSOLUTE: 4.3 K/UL (ref 1.7–7.7)
NEUTROPHILS RELATIVE PERCENT: 68.7 %
PDW BLD-RTO: 13.6 % (ref 12.4–15.4)
PLATELET # BLD: 292 K/UL (ref 135–450)
PMV BLD AUTO: 8.2 FL (ref 5–10.5)
RBC # BLD: 3.45 M/UL (ref 4–5.2)
WBC # BLD: 6.2 K/UL (ref 4–11)

## 2018-08-20 PROCEDURE — G8926 SPIRO NO PERF OR DOC: HCPCS | Performed by: INTERNAL MEDICINE

## 2018-08-20 PROCEDURE — 1123F ACP DISCUSS/DSCN MKR DOCD: CPT | Performed by: INTERNAL MEDICINE

## 2018-08-20 PROCEDURE — 1101F PT FALLS ASSESS-DOCD LE1/YR: CPT | Performed by: INTERNAL MEDICINE

## 2018-08-20 PROCEDURE — 3023F SPIROM DOC REV: CPT | Performed by: INTERNAL MEDICINE

## 2018-08-20 PROCEDURE — 1090F PRES/ABSN URINE INCON ASSESS: CPT | Performed by: INTERNAL MEDICINE

## 2018-08-20 PROCEDURE — 99214 OFFICE O/P EST MOD 30 MIN: CPT | Performed by: INTERNAL MEDICINE

## 2018-08-20 PROCEDURE — G8420 CALC BMI NORM PARAMETERS: HCPCS | Performed by: INTERNAL MEDICINE

## 2018-08-20 PROCEDURE — G8427 DOCREV CUR MEDS BY ELIG CLIN: HCPCS | Performed by: INTERNAL MEDICINE

## 2018-08-20 PROCEDURE — G8399 PT W/DXA RESULTS DOCUMENT: HCPCS | Performed by: INTERNAL MEDICINE

## 2018-08-20 PROCEDURE — 4004F PT TOBACCO SCREEN RCVD TLK: CPT | Performed by: INTERNAL MEDICINE

## 2018-08-20 PROCEDURE — G0009 ADMIN PNEUMOCOCCAL VACCINE: HCPCS | Performed by: INTERNAL MEDICINE

## 2018-08-20 PROCEDURE — 90732 PPSV23 VACC 2 YRS+ SUBQ/IM: CPT | Performed by: INTERNAL MEDICINE

## 2018-08-20 PROCEDURE — 4040F PNEUMOC VAC/ADMIN/RCVD: CPT | Performed by: INTERNAL MEDICINE

## 2018-08-20 RX ORDER — OXYCODONE HYDROCHLORIDE AND ACETAMINOPHEN 10; 325 MG/1; MG/1
1 TABLET ORAL EVERY 6 HOURS PRN
Qty: 90 TABLET | Refills: 0 | COMMUNITY
Start: 2018-08-20 | End: 2021-04-15

## 2018-08-20 ASSESSMENT — PATIENT HEALTH QUESTIONNAIRE - PHQ9
2. FEELING DOWN, DEPRESSED OR HOPELESS: 0
SUM OF ALL RESPONSES TO PHQ9 QUESTIONS 1 & 2: 0
SUM OF ALL RESPONSES TO PHQ QUESTIONS 1-9: 0
1. LITTLE INTEREST OR PLEASURE IN DOING THINGS: 0
SUM OF ALL RESPONSES TO PHQ QUESTIONS 1-9: 0

## 2018-08-20 ASSESSMENT — ENCOUNTER SYMPTOMS
GASTROINTESTINAL NEGATIVE: 1
BACK PAIN: 1
SHORTNESS OF BREATH: 1
WHEEZING: 0

## 2018-08-20 NOTE — PROGRESS NOTES
SUBJECTIVE:    Patient ID: Ileana Meng is an 70 y.o. female. HPI: Patient here today for the f/u of chronic problems -- see Problem List and associated comments. New issues or complaints include (also see Assessment for more details):  Here for follow-up. She continues to smoke without any inclination to stop. She has a new pain physician who is increasing her Percocet and weaning her off Xanax if possible. She is easily short of breath. She does not want any further evaluation in this regard. She has some more numbness in her right fingertips and she was told to alleviate the problem she would need to have more surgery on her wristsshe at this point would decline any surgery. Review of Systems   Constitutional: Negative for activity change and appetite change. Respiratory: Positive for shortness of breath. Negative for wheezing. Cardiovascular: Negative for chest pain. Gastrointestinal: Negative. Genitourinary: Negative for difficulty urinating. Musculoskeletal: Positive for back pain. Neurological: Positive for weakness and numbness. Psychiatric/Behavioral: Positive for sleep disturbance. The patient is not nervous/anxious. OBJECTIVE:    BP (!) 142/70 (Site: Left Arm, Position: Sitting, Cuff Size: Medium Adult)   Pulse 78   Ht 5' 5\" (1.651 m)   Wt 140 lb (63.5 kg)   SpO2 95%   BMI 23.30 kg/m²      Physical Exam   Constitutional: She is oriented to person, place, and time. She appears well-developed and well-nourished. Using wheeled walker   Eyes: No scleral icterus. Neck: No JVD present. Cardiovascular: Normal rate, regular rhythm and normal heart sounds. Pulmonary/Chest: No stridor. No respiratory distress. She has decreased breath sounds. She has no wheezes. She has no rhonchi. She has no rales. Abdominal: Soft. Bowel sounds are normal. She exhibits no distension. Neurological: She is alert and oriented to person, place, and time.  She exhibits abnormal

## 2018-08-21 LAB
A/G RATIO: 1.7 (ref 1.1–2.2)
ALBUMIN SERPL-MCNC: 4.5 G/DL (ref 3.4–5)
ALP BLD-CCNC: 64 U/L (ref 40–129)
ALT SERPL-CCNC: 8 U/L (ref 10–40)
ANION GAP SERPL CALCULATED.3IONS-SCNC: 15 MMOL/L (ref 3–16)
AST SERPL-CCNC: 16 U/L (ref 15–37)
BILIRUB SERPL-MCNC: 0.3 MG/DL (ref 0–1)
BUN BLDV-MCNC: 14 MG/DL (ref 7–20)
CALCIUM SERPL-MCNC: 9.4 MG/DL (ref 8.3–10.6)
CHLORIDE BLD-SCNC: 93 MMOL/L (ref 99–110)
CHOLESTEROL, TOTAL: 223 MG/DL (ref 0–199)
CO2: 24 MMOL/L (ref 21–32)
CREAT SERPL-MCNC: 1.2 MG/DL (ref 0.6–1.2)
ESTIMATED AVERAGE GLUCOSE: 105.4 MG/DL
GFR AFRICAN AMERICAN: 53
GFR NON-AFRICAN AMERICAN: 44
GLOBULIN: 2.7 G/DL
GLUCOSE BLD-MCNC: 95 MG/DL (ref 70–99)
HBA1C MFR BLD: 5.3 %
HDLC SERPL-MCNC: 69 MG/DL (ref 40–60)
LDL CHOLESTEROL CALCULATED: 116 MG/DL
POTASSIUM SERPL-SCNC: 4.7 MMOL/L (ref 3.5–5.1)
SODIUM BLD-SCNC: 132 MMOL/L (ref 136–145)
TOTAL PROTEIN: 7.2 G/DL (ref 6.4–8.2)
TRIGL SERPL-MCNC: 190 MG/DL (ref 0–150)
TSH REFLEX FT4: 1.03 UIU/ML (ref 0.27–4.2)
VLDLC SERPL CALC-MCNC: 38 MG/DL

## 2018-09-07 NOTE — TELEPHONE ENCOUNTER
Can we verify from Dr. Mir Du office that he wants us to refill this before I give the Rx. See MAR for last dose taken Coreg 25mg at 0600/See MAR for last dose taken

## 2018-09-26 PROBLEM — Z00.00 PREVENTATIVE HEALTH CARE: Status: RESOLVED | Noted: 2017-04-03 | Resolved: 2018-09-26

## 2018-11-07 ENCOUNTER — OFFICE VISIT (OUTPATIENT)
Dept: INTERNAL MEDICINE CLINIC | Age: 78
End: 2018-11-07
Payer: MEDICARE

## 2018-11-07 VITALS
DIASTOLIC BLOOD PRESSURE: 68 MMHG | BODY MASS INDEX: 21.99 KG/M2 | WEIGHT: 132 LBS | HEIGHT: 65 IN | SYSTOLIC BLOOD PRESSURE: 126 MMHG

## 2018-11-07 DIAGNOSIS — E78.49 OTHER HYPERLIPIDEMIA: ICD-10-CM

## 2018-11-07 DIAGNOSIS — I63.9 CEREBROVASCULAR ACCIDENT (CVA), UNSPECIFIED MECHANISM (HCC): ICD-10-CM

## 2018-11-07 DIAGNOSIS — I10 ESSENTIAL HYPERTENSION: ICD-10-CM

## 2018-11-07 DIAGNOSIS — F17.210 CIGARETTE SMOKER: ICD-10-CM

## 2018-11-07 DIAGNOSIS — R10.9 CHRONIC RIGHT FLANK PAIN: ICD-10-CM

## 2018-11-07 DIAGNOSIS — N18.30 CHRONIC KIDNEY DISEASE, STAGE III (MODERATE) (HCC): ICD-10-CM

## 2018-11-07 DIAGNOSIS — G89.29 CHRONIC RIGHT FLANK PAIN: ICD-10-CM

## 2018-11-07 DIAGNOSIS — J43.8 OTHER EMPHYSEMA (HCC): ICD-10-CM

## 2018-11-07 PROBLEM — R60.0 EDEMA OF BOTH LEGS: Status: RESOLVED | Noted: 2018-02-16 | Resolved: 2018-11-07

## 2018-11-07 PROBLEM — R39.11 URINARY HESITANCY: Status: RESOLVED | Noted: 2017-06-26 | Resolved: 2018-11-07

## 2018-11-07 PROCEDURE — G8482 FLU IMMUNIZE ORDER/ADMIN: HCPCS | Performed by: INTERNAL MEDICINE

## 2018-11-07 PROCEDURE — G8926 SPIRO NO PERF OR DOC: HCPCS | Performed by: INTERNAL MEDICINE

## 2018-11-07 PROCEDURE — G8427 DOCREV CUR MEDS BY ELIG CLIN: HCPCS | Performed by: INTERNAL MEDICINE

## 2018-11-07 PROCEDURE — G8598 ASA/ANTIPLAT THER USED: HCPCS | Performed by: INTERNAL MEDICINE

## 2018-11-07 PROCEDURE — 4040F PNEUMOC VAC/ADMIN/RCVD: CPT | Performed by: INTERNAL MEDICINE

## 2018-11-07 PROCEDURE — 99215 OFFICE O/P EST HI 40 MIN: CPT | Performed by: INTERNAL MEDICINE

## 2018-11-07 PROCEDURE — G8399 PT W/DXA RESULTS DOCUMENT: HCPCS | Performed by: INTERNAL MEDICINE

## 2018-11-07 PROCEDURE — 3023F SPIROM DOC REV: CPT | Performed by: INTERNAL MEDICINE

## 2018-11-07 PROCEDURE — G8420 CALC BMI NORM PARAMETERS: HCPCS | Performed by: INTERNAL MEDICINE

## 2018-11-07 PROCEDURE — 1101F PT FALLS ASSESS-DOCD LE1/YR: CPT | Performed by: INTERNAL MEDICINE

## 2018-11-07 PROCEDURE — 4004F PT TOBACCO SCREEN RCVD TLK: CPT | Performed by: INTERNAL MEDICINE

## 2018-11-07 PROCEDURE — 1090F PRES/ABSN URINE INCON ASSESS: CPT | Performed by: INTERNAL MEDICINE

## 2018-11-07 PROCEDURE — 1123F ACP DISCUSS/DSCN MKR DOCD: CPT | Performed by: INTERNAL MEDICINE

## 2018-11-07 RX ORDER — AMLODIPINE BESYLATE 5 MG/1
5 TABLET ORAL DAILY
Qty: 90 TABLET | Refills: 3 | Status: SHIPPED | OUTPATIENT
Start: 2018-11-07 | End: 2020-01-20

## 2018-11-07 RX ORDER — AMLODIPINE BESYLATE 5 MG/1
5 TABLET ORAL DAILY
COMMUNITY
Start: 2018-10-23 | End: 2018-11-07 | Stop reason: SDUPTHER

## 2018-11-07 ASSESSMENT — ENCOUNTER SYMPTOMS
TROUBLE SWALLOWING: 0
GASTROINTESTINAL NEGATIVE: 1
WHEEZING: 0
BACK PAIN: 1
SHORTNESS OF BREATH: 0

## 2018-11-07 ASSESSMENT — PATIENT HEALTH QUESTIONNAIRE - PHQ9
1. LITTLE INTEREST OR PLEASURE IN DOING THINGS: 0
SUM OF ALL RESPONSES TO PHQ QUESTIONS 1-9: 0
SUM OF ALL RESPONSES TO PHQ9 QUESTIONS 1 & 2: 0
2. FEELING DOWN, DEPRESSED OR HOPELESS: 0
SUM OF ALL RESPONSES TO PHQ QUESTIONS 1-9: 0

## 2018-11-07 NOTE — PROGRESS NOTES
encounter. PSH, PMH, SH and FH reviewed and noted. Recent and past labs, tests and consultsalso reviewed. Recent or new meds also reviewed. 45 minutes spent with patient and family/caregivers discussing diagnoses and plan; at least 50% of which was for care coordination.

## 2019-02-13 ENCOUNTER — HOSPITAL ENCOUNTER (OUTPATIENT)
Age: 79
Discharge: HOME | End: 2019-02-13
Payer: MEDICARE

## 2019-02-13 ENCOUNTER — HOSPITAL ENCOUNTER (OUTPATIENT)
Dept: GENERAL RADIOLOGY | Age: 79
Discharge: HOME | End: 2019-02-13
Payer: MEDICARE

## 2019-02-13 ENCOUNTER — OFFICE VISIT (OUTPATIENT)
Dept: INTERNAL MEDICINE CLINIC | Age: 79
End: 2019-02-13
Payer: MEDICARE

## 2019-02-13 VITALS
OXYGEN SATURATION: 94 % | HEART RATE: 82 BPM | DIASTOLIC BLOOD PRESSURE: 70 MMHG | BODY MASS INDEX: 21.99 KG/M2 | HEIGHT: 65 IN | WEIGHT: 132 LBS | SYSTOLIC BLOOD PRESSURE: 138 MMHG

## 2019-02-13 DIAGNOSIS — M54.2 NECK PAIN ON LEFT SIDE: ICD-10-CM

## 2019-02-13 DIAGNOSIS — G47.9 SLEEP DIFFICULTIES: ICD-10-CM

## 2019-02-13 DIAGNOSIS — J43.8 OTHER EMPHYSEMA (HCC): ICD-10-CM

## 2019-02-13 PROCEDURE — G8399 PT W/DXA RESULTS DOCUMENT: HCPCS | Performed by: INTERNAL MEDICINE

## 2019-02-13 PROCEDURE — 4004F PT TOBACCO SCREEN RCVD TLK: CPT | Performed by: INTERNAL MEDICINE

## 2019-02-13 PROCEDURE — G8420 CALC BMI NORM PARAMETERS: HCPCS | Performed by: INTERNAL MEDICINE

## 2019-02-13 PROCEDURE — 4040F PNEUMOC VAC/ADMIN/RCVD: CPT | Performed by: INTERNAL MEDICINE

## 2019-02-13 PROCEDURE — G8598 ASA/ANTIPLAT THER USED: HCPCS | Performed by: INTERNAL MEDICINE

## 2019-02-13 PROCEDURE — G8482 FLU IMMUNIZE ORDER/ADMIN: HCPCS | Performed by: INTERNAL MEDICINE

## 2019-02-13 PROCEDURE — G8926 SPIRO NO PERF OR DOC: HCPCS | Performed by: INTERNAL MEDICINE

## 2019-02-13 PROCEDURE — 72040 X-RAY EXAM NECK SPINE 2-3 VW: CPT

## 2019-02-13 PROCEDURE — G8427 DOCREV CUR MEDS BY ELIG CLIN: HCPCS | Performed by: INTERNAL MEDICINE

## 2019-02-13 PROCEDURE — 1123F ACP DISCUSS/DSCN MKR DOCD: CPT | Performed by: INTERNAL MEDICINE

## 2019-02-13 PROCEDURE — 99214 OFFICE O/P EST MOD 30 MIN: CPT | Performed by: INTERNAL MEDICINE

## 2019-02-13 PROCEDURE — 1090F PRES/ABSN URINE INCON ASSESS: CPT | Performed by: INTERNAL MEDICINE

## 2019-02-13 PROCEDURE — 3023F SPIROM DOC REV: CPT | Performed by: INTERNAL MEDICINE

## 2019-02-13 PROCEDURE — 1101F PT FALLS ASSESS-DOCD LE1/YR: CPT | Performed by: INTERNAL MEDICINE

## 2019-02-13 RX ORDER — PREDNISONE 10 MG/1
TABLET ORAL
Qty: 30 TABLET | Refills: 0 | Status: SHIPPED | OUTPATIENT
Start: 2019-02-13 | End: 2019-02-23

## 2019-02-13 RX ORDER — MIRTAZAPINE 15 MG/1
15 TABLET, FILM COATED ORAL NIGHTLY
Qty: 30 TABLET | Refills: 3 | Status: SHIPPED | OUTPATIENT
Start: 2019-02-13 | End: 2019-07-10 | Stop reason: SDUPTHER

## 2019-02-13 ASSESSMENT — ENCOUNTER SYMPTOMS
BACK PAIN: 1
SHORTNESS OF BREATH: 0
TROUBLE SWALLOWING: 0

## 2019-02-13 ASSESSMENT — PATIENT HEALTH QUESTIONNAIRE - PHQ9
1. LITTLE INTEREST OR PLEASURE IN DOING THINGS: 0
2. FEELING DOWN, DEPRESSED OR HOPELESS: 0
SUM OF ALL RESPONSES TO PHQ QUESTIONS 1-9: 0
SUM OF ALL RESPONSES TO PHQ QUESTIONS 1-9: 0
SUM OF ALL RESPONSES TO PHQ9 QUESTIONS 1 & 2: 0

## 2019-04-03 ENCOUNTER — OFFICE VISIT (OUTPATIENT)
Dept: INTERNAL MEDICINE CLINIC | Age: 79
End: 2019-04-03
Payer: MEDICARE

## 2019-04-03 VITALS
HEIGHT: 65 IN | HEART RATE: 70 BPM | DIASTOLIC BLOOD PRESSURE: 78 MMHG | WEIGHT: 129 LBS | SYSTOLIC BLOOD PRESSURE: 134 MMHG | OXYGEN SATURATION: 97 % | BODY MASS INDEX: 21.49 KG/M2

## 2019-04-03 DIAGNOSIS — G47.9 SLEEP DIFFICULTIES: Primary | ICD-10-CM

## 2019-04-03 DIAGNOSIS — F17.210 CIGARETTE SMOKER: ICD-10-CM

## 2019-04-03 DIAGNOSIS — I10 ESSENTIAL HYPERTENSION: ICD-10-CM

## 2019-04-03 DIAGNOSIS — M54.2 NECK PAIN ON LEFT SIDE: ICD-10-CM

## 2019-04-03 PROCEDURE — G8420 CALC BMI NORM PARAMETERS: HCPCS | Performed by: INTERNAL MEDICINE

## 2019-04-03 PROCEDURE — 4004F PT TOBACCO SCREEN RCVD TLK: CPT | Performed by: INTERNAL MEDICINE

## 2019-04-03 PROCEDURE — G8427 DOCREV CUR MEDS BY ELIG CLIN: HCPCS | Performed by: INTERNAL MEDICINE

## 2019-04-03 PROCEDURE — 4040F PNEUMOC VAC/ADMIN/RCVD: CPT | Performed by: INTERNAL MEDICINE

## 2019-04-03 PROCEDURE — 99214 OFFICE O/P EST MOD 30 MIN: CPT | Performed by: INTERNAL MEDICINE

## 2019-04-03 PROCEDURE — G8399 PT W/DXA RESULTS DOCUMENT: HCPCS | Performed by: INTERNAL MEDICINE

## 2019-04-03 PROCEDURE — 1123F ACP DISCUSS/DSCN MKR DOCD: CPT | Performed by: INTERNAL MEDICINE

## 2019-04-03 PROCEDURE — 1090F PRES/ABSN URINE INCON ASSESS: CPT | Performed by: INTERNAL MEDICINE

## 2019-04-03 PROCEDURE — G8598 ASA/ANTIPLAT THER USED: HCPCS | Performed by: INTERNAL MEDICINE

## 2019-04-03 ASSESSMENT — ENCOUNTER SYMPTOMS: SHORTNESS OF BREATH: 0

## 2019-04-03 ASSESSMENT — PATIENT HEALTH QUESTIONNAIRE - PHQ9
1. LITTLE INTEREST OR PLEASURE IN DOING THINGS: 0
2. FEELING DOWN, DEPRESSED OR HOPELESS: 0
SUM OF ALL RESPONSES TO PHQ9 QUESTIONS 1 & 2: 0
SUM OF ALL RESPONSES TO PHQ QUESTIONS 1-9: 0
SUM OF ALL RESPONSES TO PHQ QUESTIONS 1-9: 0

## 2019-04-03 NOTE — PROGRESS NOTES
SUBJECTIVE:  Patient ID: Anca Marie is an 70 y.o. female. HPI: Patient here today for the f/u of chronic problems-- see Problem List and associated comments. New issues or complaints include (alsosee Assessment for more details):  Patient tolerating her meds well. Her chronic pain is stable and controlled. She still noticed pain doctor. She still has trouble sleeping-the mirtazapine does help a little bit but not significant. Still smoking with no inclination to stop. Her left neck pain is still present but tolerable and she does not wish to pursue any further diagnostics or treatments. Review of Systems   Constitutional: Negative for activity change and appetite change. Respiratory: Negative for shortness of breath. Cardiovascular: Negative for chest pain. Musculoskeletal: Positive for neck pain. Psychiatric/Behavioral: Positive for sleep disturbance. Negative for dysphoric mood. The patient is nervous/anxious. OBJECTIVE:    /78 (Site: Left Upper Arm, Position: Sitting, Cuff Size: Medium Adult)   Pulse 70   Ht 5' 5\" (1.651 m)   Wt 129 lb (58.5 kg)   SpO2 97%   BMI 21.47 kg/m²      Physical Exam   Constitutional: She is oriented to person, place, and time. She appears well-developed and well-nourished. No distress. Using cane   Eyes: No scleral icterus. Neck: No JVD present. Cardiovascular: Normal rate, regular rhythm and normal heart sounds. Exam reveals no gallop. No murmur heard. Pulmonary/Chest: Effort normal. No stridor. No respiratory distress. She has decreased breath sounds. She has no rales. Abdominal: Soft. Bowel sounds are normal.   Musculoskeletal: She exhibits edema ( bilateral ankles trace). Flexed at waist.   Neurological: She is alert and oriented to person, place, and time. No cranial nerve deficit. Skin: She is not diaphoretic. No pallor.    Psychiatric: Her behavior is normal. Judgment and thought content normal.       ASSESSMENT:

## 2019-04-11 DIAGNOSIS — F32.A ANXIETY AND DEPRESSION: ICD-10-CM

## 2019-04-11 DIAGNOSIS — F41.9 ANXIETY AND DEPRESSION: ICD-10-CM

## 2019-04-12 RX ORDER — VENLAFAXINE HYDROCHLORIDE 75 MG/1
CAPSULE, EXTENDED RELEASE ORAL
Qty: 30 CAPSULE | Refills: 4 | Status: SHIPPED | OUTPATIENT
Start: 2019-04-12 | End: 2019-08-16 | Stop reason: SDUPTHER

## 2019-06-18 RX ORDER — FUROSEMIDE 40 MG/1
TABLET ORAL
Qty: 90 TABLET | Refills: 2 | Status: SHIPPED | OUTPATIENT
Start: 2019-06-18 | End: 2021-05-07 | Stop reason: ALTCHOICE

## 2019-06-18 RX ORDER — POTASSIUM CHLORIDE 20 MEQ/1
TABLET, EXTENDED RELEASE ORAL
Qty: 90 TABLET | Refills: 2 | Status: SHIPPED | OUTPATIENT
Start: 2019-06-18 | End: 2019-08-16

## 2019-07-10 RX ORDER — MIRTAZAPINE 15 MG/1
TABLET, FILM COATED ORAL
Qty: 30 TABLET | Refills: 2 | Status: SHIPPED | OUTPATIENT
Start: 2019-07-10 | End: 2019-08-16

## 2019-08-16 ENCOUNTER — OFFICE VISIT (OUTPATIENT)
Dept: INTERNAL MEDICINE CLINIC | Age: 79
End: 2019-08-16
Payer: MEDICARE

## 2019-08-16 VITALS
DIASTOLIC BLOOD PRESSURE: 58 MMHG | BODY MASS INDEX: 19.99 KG/M2 | HEART RATE: 73 BPM | SYSTOLIC BLOOD PRESSURE: 118 MMHG | HEIGHT: 65 IN | WEIGHT: 120 LBS | OXYGEN SATURATION: 98 %

## 2019-08-16 DIAGNOSIS — L65.9 HAIR LOSS: ICD-10-CM

## 2019-08-16 DIAGNOSIS — G47.9 SLEEP DIFFICULTIES: ICD-10-CM

## 2019-08-16 DIAGNOSIS — F17.210 CIGARETTE SMOKER: ICD-10-CM

## 2019-08-16 DIAGNOSIS — J43.8 OTHER EMPHYSEMA (HCC): ICD-10-CM

## 2019-08-16 DIAGNOSIS — I10 ESSENTIAL HYPERTENSION: ICD-10-CM

## 2019-08-16 DIAGNOSIS — F41.9 ANXIETY AND DEPRESSION: ICD-10-CM

## 2019-08-16 DIAGNOSIS — F32.A ANXIETY AND DEPRESSION: ICD-10-CM

## 2019-08-16 PROCEDURE — 4004F PT TOBACCO SCREEN RCVD TLK: CPT | Performed by: INTERNAL MEDICINE

## 2019-08-16 PROCEDURE — G8427 DOCREV CUR MEDS BY ELIG CLIN: HCPCS | Performed by: INTERNAL MEDICINE

## 2019-08-16 PROCEDURE — G8420 CALC BMI NORM PARAMETERS: HCPCS | Performed by: INTERNAL MEDICINE

## 2019-08-16 PROCEDURE — 3023F SPIROM DOC REV: CPT | Performed by: INTERNAL MEDICINE

## 2019-08-16 PROCEDURE — 99214 OFFICE O/P EST MOD 30 MIN: CPT | Performed by: INTERNAL MEDICINE

## 2019-08-16 PROCEDURE — G8598 ASA/ANTIPLAT THER USED: HCPCS | Performed by: INTERNAL MEDICINE

## 2019-08-16 PROCEDURE — 4040F PNEUMOC VAC/ADMIN/RCVD: CPT | Performed by: INTERNAL MEDICINE

## 2019-08-16 PROCEDURE — 1123F ACP DISCUSS/DSCN MKR DOCD: CPT | Performed by: INTERNAL MEDICINE

## 2019-08-16 PROCEDURE — G8399 PT W/DXA RESULTS DOCUMENT: HCPCS | Performed by: INTERNAL MEDICINE

## 2019-08-16 PROCEDURE — 1090F PRES/ABSN URINE INCON ASSESS: CPT | Performed by: INTERNAL MEDICINE

## 2019-08-16 PROCEDURE — G8926 SPIRO NO PERF OR DOC: HCPCS | Performed by: INTERNAL MEDICINE

## 2019-08-16 RX ORDER — HYDROXYZINE HYDROCHLORIDE 25 MG/1
TABLET, FILM COATED ORAL
Qty: 120 TABLET | Refills: 5 | Status: SHIPPED | OUTPATIENT
Start: 2019-08-16 | End: 2020-02-24

## 2019-08-16 RX ORDER — MIRTAZAPINE 15 MG/1
TABLET, FILM COATED ORAL
Qty: 30 TABLET | Refills: 2 | Status: CANCELLED | OUTPATIENT
Start: 2019-08-16

## 2019-08-16 RX ORDER — VENLAFAXINE HYDROCHLORIDE 75 MG/1
CAPSULE, EXTENDED RELEASE ORAL
Qty: 90 CAPSULE | Refills: 3 | Status: SHIPPED | OUTPATIENT
Start: 2019-08-16 | End: 2020-08-10

## 2019-08-16 RX ORDER — SPIRONOLACTONE 25 MG/1
25 TABLET ORAL DAILY
Qty: 90 TABLET | Refills: 3 | Status: SHIPPED | OUTPATIENT
Start: 2019-08-16 | End: 2021-03-26

## 2019-08-16 ASSESSMENT — PATIENT HEALTH QUESTIONNAIRE - PHQ9
2. FEELING DOWN, DEPRESSED OR HOPELESS: 0
SUM OF ALL RESPONSES TO PHQ9 QUESTIONS 1 & 2: 0
SUM OF ALL RESPONSES TO PHQ QUESTIONS 1-9: 0
SUM OF ALL RESPONSES TO PHQ QUESTIONS 1-9: 0
1. LITTLE INTEREST OR PLEASURE IN DOING THINGS: 0

## 2019-08-16 ASSESSMENT — ENCOUNTER SYMPTOMS
SHORTNESS OF BREATH: 1
BACK PAIN: 1

## 2019-09-13 ENCOUNTER — CARE COORDINATION (OUTPATIENT)
Dept: CARE COORDINATION | Age: 79
End: 2019-09-13

## 2019-09-20 ENCOUNTER — CARE COORDINATION (OUTPATIENT)
Dept: CARE COORDINATION | Age: 79
End: 2019-09-20

## 2020-01-20 RX ORDER — AMLODIPINE BESYLATE 5 MG/1
TABLET ORAL
Qty: 90 TABLET | Refills: 2 | Status: SHIPPED | OUTPATIENT
Start: 2020-01-20 | End: 2020-01-21

## 2020-02-24 RX ORDER — HYDROXYZINE HYDROCHLORIDE 25 MG/1
TABLET, FILM COATED ORAL
Qty: 120 TABLET | Refills: 1 | Status: SHIPPED | OUTPATIENT
Start: 2020-02-24 | End: 2020-05-05

## 2020-05-05 RX ORDER — HYDROXYZINE HYDROCHLORIDE 25 MG/1
TABLET, FILM COATED ORAL
Qty: 120 TABLET | Refills: 0 | Status: SHIPPED | OUTPATIENT
Start: 2020-05-05 | End: 2020-06-08

## 2020-06-08 RX ORDER — HYDROXYZINE HYDROCHLORIDE 25 MG/1
TABLET, FILM COATED ORAL
Qty: 120 TABLET | Refills: 0 | Status: SHIPPED | OUTPATIENT
Start: 2020-06-08 | End: 2020-08-10

## 2020-08-10 RX ORDER — VENLAFAXINE HYDROCHLORIDE 75 MG/1
CAPSULE, EXTENDED RELEASE ORAL
Qty: 90 CAPSULE | Refills: 2 | Status: SHIPPED | OUTPATIENT
Start: 2020-08-10 | End: 2021-06-14

## 2020-08-10 RX ORDER — HYDROXYZINE HYDROCHLORIDE 25 MG/1
TABLET, FILM COATED ORAL
Qty: 120 TABLET | Refills: 0 | Status: SHIPPED | OUTPATIENT
Start: 2020-08-10 | End: 2020-09-01

## 2020-09-01 RX ORDER — HYDROXYZINE HYDROCHLORIDE 25 MG/1
TABLET, FILM COATED ORAL
Qty: 120 TABLET | Refills: 0 | Status: SHIPPED | OUTPATIENT
Start: 2020-09-01 | End: 2020-12-21

## 2020-09-11 RX ORDER — HYDROXYZINE HYDROCHLORIDE 25 MG/1
TABLET, FILM COATED ORAL
Qty: 120 TABLET | Refills: 0 | OUTPATIENT
Start: 2020-09-11

## 2020-11-02 RX ORDER — HYDROXYZINE HYDROCHLORIDE 25 MG/1
TABLET, FILM COATED ORAL
Qty: 120 TABLET | Refills: 0 | OUTPATIENT
Start: 2020-11-02

## 2020-11-25 ENCOUNTER — OFFICE VISIT (OUTPATIENT)
Dept: INTERNAL MEDICINE CLINIC | Age: 80
End: 2020-11-25
Payer: MEDICARE

## 2020-11-25 VITALS
SYSTOLIC BLOOD PRESSURE: 124 MMHG | HEIGHT: 65 IN | BODY MASS INDEX: 22.53 KG/M2 | WEIGHT: 135.2 LBS | TEMPERATURE: 97.5 F | DIASTOLIC BLOOD PRESSURE: 70 MMHG

## 2020-11-25 DIAGNOSIS — E78.49 OTHER HYPERLIPIDEMIA: ICD-10-CM

## 2020-11-25 DIAGNOSIS — I10 ESSENTIAL HYPERTENSION: ICD-10-CM

## 2020-11-25 DIAGNOSIS — R25.1 TREMOR: ICD-10-CM

## 2020-11-25 DIAGNOSIS — R06.09 DYSPNEA ON EXERTION: ICD-10-CM

## 2020-11-25 PROCEDURE — G8427 DOCREV CUR MEDS BY ELIG CLIN: HCPCS | Performed by: INTERNAL MEDICINE

## 2020-11-25 PROCEDURE — 1090F PRES/ABSN URINE INCON ASSESS: CPT | Performed by: INTERNAL MEDICINE

## 2020-11-25 PROCEDURE — 4040F PNEUMOC VAC/ADMIN/RCVD: CPT | Performed by: INTERNAL MEDICINE

## 2020-11-25 PROCEDURE — G0439 PPPS, SUBSEQ VISIT: HCPCS | Performed by: INTERNAL MEDICINE

## 2020-11-25 PROCEDURE — G8420 CALC BMI NORM PARAMETERS: HCPCS | Performed by: INTERNAL MEDICINE

## 2020-11-25 PROCEDURE — 90694 VACC AIIV4 NO PRSRV 0.5ML IM: CPT | Performed by: INTERNAL MEDICINE

## 2020-11-25 PROCEDURE — 3023F SPIROM DOC REV: CPT | Performed by: INTERNAL MEDICINE

## 2020-11-25 PROCEDURE — G8484 FLU IMMUNIZE NO ADMIN: HCPCS | Performed by: INTERNAL MEDICINE

## 2020-11-25 PROCEDURE — G8399 PT W/DXA RESULTS DOCUMENT: HCPCS | Performed by: INTERNAL MEDICINE

## 2020-11-25 PROCEDURE — G8926 SPIRO NO PERF OR DOC: HCPCS | Performed by: INTERNAL MEDICINE

## 2020-11-25 PROCEDURE — 4004F PT TOBACCO SCREEN RCVD TLK: CPT | Performed by: INTERNAL MEDICINE

## 2020-11-25 PROCEDURE — 99213 OFFICE O/P EST LOW 20 MIN: CPT | Performed by: INTERNAL MEDICINE

## 2020-11-25 PROCEDURE — 1123F ACP DISCUSS/DSCN MKR DOCD: CPT | Performed by: INTERNAL MEDICINE

## 2020-11-25 PROCEDURE — G0008 ADMIN INFLUENZA VIRUS VAC: HCPCS | Performed by: INTERNAL MEDICINE

## 2020-11-25 RX ORDER — ALPRAZOLAM 0.5 MG/1
0.5 TABLET ORAL DAILY PRN
Qty: 30 TABLET | Refills: 0 | Status: SHIPPED | OUTPATIENT
Start: 2020-11-25 | End: 2020-12-23

## 2020-11-25 SDOH — ECONOMIC STABILITY: INCOME INSECURITY: HOW HARD IS IT FOR YOU TO PAY FOR THE VERY BASICS LIKE FOOD, HOUSING, MEDICAL CARE, AND HEATING?: NOT HARD AT ALL

## 2020-11-25 SDOH — ECONOMIC STABILITY: TRANSPORTATION INSECURITY
IN THE PAST 12 MONTHS, HAS LACK OF TRANSPORTATION KEPT YOU FROM MEETINGS, WORK, OR FROM GETTING THINGS NEEDED FOR DAILY LIVING?: NO

## 2020-11-25 SDOH — ECONOMIC STABILITY: FOOD INSECURITY: WITHIN THE PAST 12 MONTHS, YOU WORRIED THAT YOUR FOOD WOULD RUN OUT BEFORE YOU GOT MONEY TO BUY MORE.: NEVER TRUE

## 2020-11-25 SDOH — ECONOMIC STABILITY: TRANSPORTATION INSECURITY
IN THE PAST 12 MONTHS, HAS THE LACK OF TRANSPORTATION KEPT YOU FROM MEDICAL APPOINTMENTS OR FROM GETTING MEDICATIONS?: NO

## 2020-11-25 SDOH — ECONOMIC STABILITY: FOOD INSECURITY: WITHIN THE PAST 12 MONTHS, THE FOOD YOU BOUGHT JUST DIDN'T LAST AND YOU DIDN'T HAVE MONEY TO GET MORE.: NEVER TRUE

## 2020-11-25 ASSESSMENT — ENCOUNTER SYMPTOMS
VOICE CHANGE: 0
ABDOMINAL PAIN: 1
SHORTNESS OF BREATH: 1
TROUBLE SWALLOWING: 0
WHEEZING: 0
COUGH: 0

## 2020-11-25 NOTE — ASSESSMENT & PLAN NOTE
Originally noted over 5 years ago. It was stable for a few years by CT. Not inclined for further evaluation or intervention at this time.

## 2020-11-25 NOTE — PROGRESS NOTES
SUBJECTIVE:  Patient ID: Lady Hinds is an 68 y.o. female. HPI: Patient here today for the f/u of chronic problems-- see Problem List and associated comments. New issues or complaints include (also see Assessment for more details): General checkup. She still is managing to live alone. She does have some supportive friends. She has noted no signs or symptoms of Covid during the pandemic. She also continue to live with 8 cats in her house. And she continues to smoke. She still goes to pain medicine for her opioids. She also describes some chronic anxiety and would like to retry some Xanax that she has used in the past.  She knows for safety reasons not to use the medications together. She does not drink. She has had some swelling in her lower extremities. It may get slightly worse as the day goes on. She is not having any new or unusual shortness of breath episodes. She has some chronic dyspnea most likely due to her lung disease from her smoking. She denies any chest pain. No PND orthopnea. Review of Systems   Constitutional: Positive for fatigue. Negative for fever. HENT: Positive for postnasal drip. Negative for trouble swallowing and voice change. Eyes: Negative for visual disturbance. Respiratory: Positive for shortness of breath (Stable over last few years). Negative for cough and wheezing. Cardiovascular: Positive for leg swelling. Negative for chest pain and palpitations. Gastrointestinal: Positive for abdominal pain (Stable and unchanged). Genitourinary: Negative for dysuria. Musculoskeletal: Positive for arthralgias. Skin: Negative for rash. Allergic/Immunologic: Negative for immunocompromised state. Neurological: Positive for tremors. Negative for weakness, numbness and headaches. Hematological: Negative for adenopathy. Psychiatric/Behavioral: Negative for dysphoric mood. The patient is nervous/anxious.         OBJECTIVE:    /70 (Site: Left Upper Arm)   Temp 97.5 °F (36.4 °C)   Ht 5' 5\" (1.651 m)   Wt 135 lb 3.2 oz (61.3 kg)   BMI 22.50 kg/m²      Physical Exam  Constitutional:       General: She is not in acute distress. Appearance: She is not ill-appearing. HENT:      Right Ear: External ear normal.      Left Ear: External ear normal.      Nose: No rhinorrhea. Eyes:      General: No scleral icterus. Right eye: No discharge. Left eye: No discharge. Extraocular Movements: Extraocular movements intact. Neck:      Musculoskeletal: Neck supple. Vascular: No carotid bruit. Cardiovascular:      Rate and Rhythm: Normal rate and regular rhythm. Pulses:           Carotid pulses are 2+ on the right side and 2+ on the left side. Radial pulses are 2+ on the right side and 2+ on the left side. Posterior tibial pulses are 1+ on the right side and 1+ on the left side. Heart sounds: Murmur present. Systolic murmur present with a grade of 1/6. No gallop. Comments: Bilateral lower extremities-no cords  Pulmonary:      Effort: Pulmonary effort is normal. No respiratory distress. Breath sounds: No stridor. Decreased breath sounds present. No wheezing, rhonchi or rales. Abdominal:      General: Bowel sounds are normal.      Palpations: Abdomen is soft. Tenderness: There is no abdominal tenderness. Musculoskeletal:      Right lower leg: Edema (Calf-nonpitting) present. Left lower leg: Edema (Calf-nonpitting) present. Lymphadenopathy:      Head:      Right side of head: No submandibular adenopathy. Left side of head: No submandibular adenopathy. Cervical: No cervical adenopathy. Upper Body:      Right upper body: No supraclavicular adenopathy. Left upper body: No supraclavicular adenopathy. Skin:     Coloration: Skin is not jaundiced or pale. Neurological:      General: No focal deficit present.       Mental Status: She is alert and oriented to person, place, and time.      Cranial Nerves: Cranial nerves are intact. No dysarthria. Motor: Tremor (Mild intention tremor) present. Coordination: Coordination is intact. Psychiatric:         Attention and Perception: Attention normal.         Mood and Affect: Mood normal.         Speech: Speech normal.         Behavior: Behavior normal.         Thought Content: Thought content normal.         Cognition and Memory: Cognition normal. Cognition is not impaired. Memory is not impaired. Judgment: Judgment normal.         ASSESSMENT:       Encounter Diagnoses   Name Primary?  Need for influenza vaccination Yes    Routine general medical examination at a health care facility     Anxiety and depression     Tremor     Other hyperlipidemia     Essential hypertension     Dyspnea on exertion     Preventative health care     Cigarette smoker     Other emphysema ([address]     Spinal stenosis of lumbar region with neurogenic claudication     Renal mass, right        Preventative health care  Routine checkup. She has not been seen in over a year. Flu shot today. Labs today. See problem list.    Anxiety and depression  Mostly anxiety. She would like a refill on her Xanax. Discussed use-she cannot use with her pain medication-she is well aware of this. Cigarette smoker  Continues to smoke without any inclination to stop. Essential hypertension  BP okay. Decreasing amlodipine to half a tablet due to some edema. Hyperlipidemia  Nonfasting labs today    Other emphysema ([address]  Not complaining of any significant breathing issues. She continues to smoke and has no inclination to stop. No new treatments. Spinal stenosis of lumbar region with neurogenic claudication  She has a new pain physician for her opioids. They have managed to decrease her dose slightly. Renal mass, right  Originally noted over 5 years ago. It was stable for a few years by CT.   Not inclined for further evaluation or intervention

## 2020-11-25 NOTE — ASSESSMENT & PLAN NOTE
Not complaining of any significant breathing issues. She continues to smoke and has no inclination to stop. No new treatments.

## 2020-11-25 NOTE — PATIENT INSTRUCTIONS
Personalized Preventive Plan for Mary Jane De Jesus - 11/25/2020  Medicare offers a range of preventive health benefits. Some of the tests and screenings are paid in full while other may be subject to a deductible, co-insurance, and/or copay. Some of these benefits include a comprehensive review of your medical history including lifestyle, illnesses that may run in your family, and various assessments and screenings as appropriate. After reviewing your medical record and screening and assessments performed today your provider may have ordered immunizations, labs, imaging, and/or referrals for you. A list of these orders (if applicable) as well as your Preventive Care list are included within your After Visit Summary for your review. Other Preventive Recommendations:    · A preventive eye exam performed by an eye specialist is recommended every 1-2 years to screen for glaucoma; cataracts, macular degeneration, and other eye disorders. · A preventive dental visit is recommended every 6 months. · Try to get at least 150 minutes of exercise per week or 10,000 steps per day on a pedometer . · Order or download the FREE \"Exercise & Physical Activity: Your Everyday Guide\" from [employer]. Call 1-972.835.1423 or search [employer] online. · You need 0182-6824 mg of calcium and 9976-8012 IU of vitamin D per day. It is possible to meet your calcium requirement with diet alone, but a vitamin D supplement is usually necessary to meet this goal.  · When exposed to the sun, use a sunscreen that protects against both UVA and UVB radiation with an SPF of 30 or greater. Reapply every 2 to 3 hours or after sweating, drying off with a towel, or swimming. · Always wear a seat belt when traveling in a car. Always wear a helmet when riding a bicycle or motorcycle.

## 2020-11-25 NOTE — PROGRESS NOTES
Vaccine Information Sheet, \"Influenza - Inactivated\"  given to Jeni Gamino, or parent/legal guardian of  Jeni Gamino and verbalized understanding. Patient responses:    Have you ever had a reaction to a flu vaccine? No  Do you have any current illness? No  Have you ever had Guillian Aurora Syndrome? No  Do you have a serious allergy to any of the follow: Neomycin, Polymyxin, Thimerosal, eggs or egg products? No    Flu vaccine given per order. Please see immunization tab. Risks and benefits explained. Current VIS given.       Immunizations Administered     Name Date Dose Route    Influenza, Quadv, adjuvanted, 65 yrs +, IM, PF (Fluad) 11/25/2020 0.5 mL Intramuscular    Site: Deltoid- Left    Lot: [id number]    NDC: 05921-765-46

## 2020-11-25 NOTE — ASSESSMENT & PLAN NOTE
Routine checkup. She has not been seen in over a year. Flu shot today. Labs today.   See problem list.

## 2020-11-26 LAB
A/G RATIO: 1.5 (ref 1.1–2.2)
ALBUMIN SERPL-MCNC: 4.3 G/DL (ref 3.4–5)
ALP BLD-CCNC: 75 U/L (ref 40–129)
ALT SERPL-CCNC: 8 U/L (ref 10–40)
ANION GAP SERPL CALCULATED.3IONS-SCNC: 10 MMOL/L (ref 3–16)
AST SERPL-CCNC: 13 U/L (ref 15–37)
BASOPHILS ABSOLUTE: 0 K/UL (ref 0–0.2)
BASOPHILS RELATIVE PERCENT: 0.6 %
BILIRUB SERPL-MCNC: <0.2 MG/DL (ref 0–1)
BUN BLDV-MCNC: 43 MG/DL (ref 7–20)
CALCIUM SERPL-MCNC: 9.2 MG/DL (ref 8.3–10.6)
CHLORIDE BLD-SCNC: 102 MMOL/L (ref 99–110)
CHOLESTEROL, TOTAL: 222 MG/DL (ref 0–199)
CO2: 25 MMOL/L (ref 21–32)
CREAT SERPL-MCNC: 2 MG/DL (ref 0.6–1.2)
EOSINOPHILS ABSOLUTE: 0.2 K/UL (ref 0–0.6)
EOSINOPHILS RELATIVE PERCENT: 3.3 %
GFR AFRICAN AMERICAN: 29
GFR NON-AFRICAN AMERICAN: 24
GLOBULIN: 2.8 G/DL
GLUCOSE BLD-MCNC: 85 MG/DL (ref 70–99)
HCT VFR BLD CALC: 30.7 % (ref 36–48)
HDLC SERPL-MCNC: 66 MG/DL (ref 40–60)
HEMOGLOBIN: 10 G/DL (ref 12–16)
LDL CHOLESTEROL CALCULATED: 130 MG/DL
LYMPHOCYTES ABSOLUTE: 1.3 K/UL (ref 1–5.1)
LYMPHOCYTES RELATIVE PERCENT: 20 %
MCH RBC QN AUTO: 28.9 PG (ref 26–34)
MCHC RBC AUTO-ENTMCNC: 32.4 G/DL (ref 31–36)
MCV RBC AUTO: 89 FL (ref 80–100)
MONOCYTES ABSOLUTE: 0.7 K/UL (ref 0–1.3)
MONOCYTES RELATIVE PERCENT: 9.9 %
NEUTROPHILS ABSOLUTE: 4.4 K/UL (ref 1.7–7.7)
NEUTROPHILS RELATIVE PERCENT: 66.2 %
PDW BLD-RTO: 15.7 % (ref 12.4–15.4)
PLATELET # BLD: 267 K/UL (ref 135–450)
PMV BLD AUTO: 8.6 FL (ref 5–10.5)
POTASSIUM SERPL-SCNC: 5.3 MMOL/L (ref 3.5–5.1)
PRO-BNP: 322 PG/ML (ref 0–449)
RBC # BLD: 3.45 M/UL (ref 4–5.2)
SODIUM BLD-SCNC: 137 MMOL/L (ref 136–145)
TOTAL PROTEIN: 7.1 G/DL (ref 6.4–8.2)
TRIGL SERPL-MCNC: 132 MG/DL (ref 0–150)
TSH REFLEX FT4: 0.72 UIU/ML (ref 0.27–4.2)
VLDLC SERPL CALC-MCNC: 26 MG/DL
WBC # BLD: 6.6 K/UL (ref 4–11)

## 2020-12-16 ENCOUNTER — HOSPITAL ENCOUNTER (OUTPATIENT)
Dept: ULTRASOUND IMAGING | Age: 80
Discharge: HOME | End: 2020-12-16
Payer: MEDICARE

## 2020-12-16 PROCEDURE — 76770 US EXAM ABDO BACK WALL COMP: CPT

## 2020-12-18 ENCOUNTER — APPOINTMENT (OUTPATIENT)
Dept: CT IMAGING | Age: 80
End: 2020-12-18
Payer: MEDICARE

## 2020-12-18 ENCOUNTER — HOSPITAL ENCOUNTER (EMERGENCY)
Age: 80
Discharge: HOME | End: 2020-12-18
Attending: EMERGENCY MEDICINE
Payer: MEDICARE

## 2020-12-18 VITALS
WEIGHT: 130 LBS | DIASTOLIC BLOOD PRESSURE: 66 MMHG | BODY MASS INDEX: 24.55 KG/M2 | RESPIRATION RATE: 16 BRPM | HEART RATE: 62 BPM | OXYGEN SATURATION: 97 % | HEIGHT: 61 IN | SYSTOLIC BLOOD PRESSURE: 139 MMHG | TEMPERATURE: 98.4 F

## 2020-12-18 LAB
ABO/RH: NORMAL
ALBUMIN SERPL-MCNC: 4 G/DL (ref 3.4–5)
ANION GAP SERPL CALCULATED.3IONS-SCNC: 9 MMOL/L (ref 3–16)
ANTIBODY SCREEN: NORMAL
BASOPHILS ABSOLUTE: 0 K/UL (ref 0–0.2)
BASOPHILS RELATIVE PERCENT: 0.3 %
BILIRUBIN URINE: NEGATIVE
BLOOD, URINE: NEGATIVE
BUN BLDV-MCNC: 24 MG/DL (ref 7–20)
CALCIUM SERPL-MCNC: 9.3 MG/DL (ref 8.3–10.6)
CHLORIDE BLD-SCNC: 107 MMOL/L (ref 99–110)
CLARITY: CLEAR
CO2: 25 MMOL/L (ref 21–32)
COLOR: YELLOW
CREAT SERPL-MCNC: 1.4 MG/DL (ref 0.6–1.2)
EOSINOPHILS ABSOLUTE: 0.1 K/UL (ref 0–0.6)
EOSINOPHILS RELATIVE PERCENT: 2.7 %
GFR AFRICAN AMERICAN: 44
GFR NON-AFRICAN AMERICAN: 36
GLUCOSE BLD-MCNC: 105 MG/DL (ref 70–99)
GLUCOSE URINE: NEGATIVE MG/DL
HCT VFR BLD CALC: 31.9 % (ref 36–48)
HEMOGLOBIN: 10.3 G/DL (ref 12–16)
INR BLD: 0.94 (ref 0.86–1.14)
KETONES, URINE: NEGATIVE MG/DL
LEUKOCYTE ESTERASE, URINE: NEGATIVE
LYMPHOCYTES ABSOLUTE: 1 K/UL (ref 1–5.1)
LYMPHOCYTES RELATIVE PERCENT: 18.9 %
MCH RBC QN AUTO: 29.3 PG (ref 26–34)
MCHC RBC AUTO-ENTMCNC: 32.3 G/DL (ref 31–36)
MCV RBC AUTO: 90.8 FL (ref 80–100)
MICROSCOPIC EXAMINATION: NORMAL
MONOCYTES ABSOLUTE: 0.5 K/UL (ref 0–1.3)
MONOCYTES RELATIVE PERCENT: 9.5 %
NEUTROPHILS ABSOLUTE: 3.6 K/UL (ref 1.7–7.7)
NEUTROPHILS RELATIVE PERCENT: 68.6 %
NITRITE, URINE: NEGATIVE
PDW BLD-RTO: 16.4 % (ref 12.4–15.4)
PH UA: 6 (ref 5–8)
PHOSPHORUS: 3.7 MG/DL (ref 2.5–4.9)
PLATELET # BLD: 236 K/UL (ref 135–450)
PMV BLD AUTO: 8.3 FL (ref 5–10.5)
POTASSIUM SERPL-SCNC: 4.7 MMOL/L (ref 3.5–5.1)
PROTEIN UA: NEGATIVE MG/DL
PROTHROMBIN TIME: 10.9 SEC (ref 10–13.2)
RBC # BLD: 3.51 M/UL (ref 4–5.2)
SODIUM BLD-SCNC: 141 MMOL/L (ref 136–145)
SPECIFIC GRAVITY UA: 1.01 (ref 1–1.03)
URINE TYPE: NORMAL
UROBILINOGEN, URINE: 0.2 E.U./DL
WBC # BLD: 5.3 K/UL (ref 4–11)

## 2020-12-18 PROCEDURE — 51798 US URINE CAPACITY MEASURE: CPT

## 2020-12-18 PROCEDURE — 80069 RENAL FUNCTION PANEL: CPT

## 2020-12-18 PROCEDURE — 85610 PROTHROMBIN TIME: CPT

## 2020-12-18 PROCEDURE — 85025 COMPLETE CBC W/AUTO DIFF WBC: CPT

## 2020-12-18 PROCEDURE — 86900 BLOOD TYPING SEROLOGIC ABO: CPT

## 2020-12-18 PROCEDURE — 74176 CT ABD & PELVIS W/O CONTRAST: CPT

## 2020-12-18 PROCEDURE — 86901 BLOOD TYPING SEROLOGIC RH(D): CPT

## 2020-12-18 PROCEDURE — 99283 EMERGENCY DEPT VISIT LOW MDM: CPT

## 2020-12-18 PROCEDURE — 81003 URINALYSIS AUTO W/O SCOPE: CPT

## 2020-12-18 PROCEDURE — 86850 RBC ANTIBODY SCREEN: CPT

## 2020-12-18 ASSESSMENT — ENCOUNTER SYMPTOMS
DIARRHEA: 0
WHEEZING: 0
ABDOMINAL PAIN: 0
ABDOMINAL DISTENTION: 0
NAUSEA: 0
SHORTNESS OF BREATH: 0
VOMITING: 0
COUGH: 0

## 2020-12-18 NOTE — ED TRIAGE NOTES
Patient states that Urologist instructed her to come to ED for stent placement in right kidney. Patient denies having any pain.

## 2020-12-18 NOTE — ED NOTES
Bed: A02-02  Expected date:   Expected time:   Means of arrival:   Comments:  [address], RN  12/18/20 2053

## 2020-12-18 NOTE — ED PROVIDER NOTES
ED Attending Attestation Note     Date of evaluation: 12/18/2020    This patient was seen by the resident. I have seen and examined the patient, agree with the workup, evaluation, management and diagnosis. The care plan has been discussed. My assessment reveals a 68year old female with a past medical history of HTN, HLD, emphysema, CKD, and right renal mass with recent US with marked hydronephrosis on the right follows with Dr. Mj Parmar who presents for an abnormal US. She has no abdominal pain or dysuria. On my evaluation she has a regular rate and rhythm with clear lungs. Her abdomen is soft, nontender, and nondistended. Will obtain laboratory studies including UA and CT scan to assess for mass and discuss with Urology.           Akin Garcia MD  12/18/20 5399 Satisfactory

## 2020-12-18 NOTE — ED PROVIDER NOTES
1 [facility]  EMERGENCY DEPARTMENT ENCOUNTER          [facility] RESIDENT NOTE       Date of evaluation: 12/18/2020    Chief Complaint     Abnormal Ultrasound (Right kidney blockage)      History of Present Illness     Marta Robertson is a 68 y.o. female who presents from home after receiving call from PCP to report to ED. Patient saw Nephrology recently for evaluation of CHAPO on CKD and ordered renal US which showed hydronephrosis of rt kidney and possible obstruction. Patient states she has some trouble starting a urinary stream and feels it has been much weaker lately. She also has increasing edema over the last weeks. Otherwise denies any dysuria, incontinence, fever/chills, flank pain, hematuria. Review of Systems     Review of Systems   Constitutional: Negative for chills, fatigue and fever. HENT: Negative for congestion. Respiratory: Negative for cough, shortness of breath and wheezing. Cardiovascular: Negative for chest pain and palpitations. Gastrointestinal: Negative for abdominal distention, abdominal pain, diarrhea, nausea and vomiting. Genitourinary: Positive for decreased urine volume, difficulty urinating and frequency. Negative for dysuria, flank pain, hematuria, pelvic pain and urgency. Neurological: Negative for dizziness, syncope, weakness, light-headedness and numbness. Past Medical, Surgical, Family, and Social History     She has a past medical history of Anxiety, Colon polyps, CVA (cerebral vascular accident) ([address] HTN (hypertension), Hyperlipidemia, Proteinuria, and Screening mammogram.  She has a past surgical history that includes Colonoscopy (2010); ERCP (5/8/2015); and Wrist fracture surgery (Left, 12/23/2016). Her family history includes Cancer in her brother; Diabetes in her mother; Heart Disease in her father; Kidney Disease in her brother. She reports that she has been smoking cigarettes. She started smoking about 62 years ago.  She has a 100.00 pack-year smoking history. She has never used smokeless tobacco. She reports that she does not drink alcohol or use drugs. Medications     Previous Medications    ALPRAZOLAM (XANAX) 0.5 MG TABLET    Take 1 tablet by mouth daily as needed for Anxiety. Do not take w/ pain medication. ASPIRIN 81 MG TABLET    Take 81 mg by mouth daily    ATENOLOL (TENORMIN) 25 MG TABLET    Take 1 tablet by mouth daily    FUROSEMIDE (LASIX) 40 MG TABLET    TAKE ONE TABLET BY MOUTH DAILY    GLYCOPYRROLATE-FORMOTEROL (BEVESPI AEROSPHERE) 9-4.8 MCG/ACT AERO    Inhale 2 puffs into the lungs 2 times daily    HYDROXYZINE (ATARAX) 25 MG TABLET    TAKE ONE TABLET BY MOUTH THREE TIMES A DAY AS NEEDED FOR ANXIETY AND TAKE TWO TABLETS BY MOUTH EVERY NIGHT AT BEDTIME    OXYCODONE-ACETAMINOPHEN (PERCOCET)  MG PER TABLET    Take 1 tablet by mouth every 6 hours as needed for Pain. Intended supply: 30 days. SPIRONOLACTONE (ALDACTONE) 25 MG TABLET    Take 1 tablet by mouth daily    VENLAFAXINE (EFFEXOR XR) 75 MG EXTENDED RELEASE CAPSULE    TAKE ONE CAPSULE BY MOUTH DAILY       Allergies     She is allergic to penicillins. Physical Exam     INITIAL VITALS: BP: (!) 149/62, Temp: 98.4 °F (36.9 °C), Pulse: 61, Resp: 18, SpO2: 99 %    Physical Exam  Constitutional:       General: She is not in acute distress. Appearance: She is well-developed. She is not diaphoretic. HENT:      Head: Normocephalic and atraumatic. Cardiovascular:      Rate and Rhythm: Normal rate and regular rhythm. Heart sounds: Normal heart sounds. No murmur. Pulmonary:      Effort: Pulmonary effort is normal. No respiratory distress. Breath sounds: Normal breath sounds. No wheezing. Abdominal:      General: Bowel sounds are normal. There is no distension. Palpations: Abdomen is soft. Tenderness: There is no abdominal tenderness. Skin:     General: Skin is warm and dry. Capillary Refill: Capillary refill takes less than 2 seconds.       Findings: No erythema. Neurological:      Mental Status: She is alert and oriented to person, place, and time. Psychiatric:         Behavior: Behavior normal.         Diagnostic Results       RADIOLOGY:  CT ABDOMEN PELVIS WO CONTRAST Additional Contrast? None   Final Result      1. Marked hydronephrosis on the right of uncertain etiology as described. No obstructing calculus is identified. Findings may relate to nonvisualized stone, stricture or neoplasm. Retrograde examination recommended as obstruction of the right collecting    system is suspected. .      Incidental left renal cysts. Dilatation of the common bile duct as described present previously similar indeterminate. Correlation with clinical history and laboratory values recommended.           LABS:   Results for orders placed or performed during the hospital encounter of 12/18/20   Renal function panel   Result Value Ref Range    Sodium 141 136 - 145 mmol/L    Potassium 4.7 3.5 - 5.1 mmol/L    Chloride 107 99 - 110 mmol/L    CO2 25 21 - 32 mmol/L    Anion Gap 9 3 - 16    Glucose 105 (H) 70 - 99 mg/dL    BUN 24 (H) 7 - 20 mg/dL    CREATININE 1.4 (H) 0.6 - 1.2 mg/dL    GFR Non- 36 (A) >60    GFR  44 (A) >60    Calcium 9.3 8.3 - 10.6 mg/dL    Phosphorus 3.7 2.5 - 4.9 mg/dL    Alb 4.0 3.4 - 5.0 g/dL   CBC Auto Differential   Result Value Ref Range    WBC 5.3 4.0 - 11.0 K/uL    RBC 3.51 (L) 4.00 - 5.20 M/uL    Hemoglobin 10.3 (L) 12.0 - 16.0 g/dL    Hematocrit 31.9 (L) 36.0 - 48.0 %    MCV 90.8 80.0 - 100.0 fL    MCH 29.3 26.0 - 34.0 pg    MCHC 32.3 31.0 - 36.0 g/dL    RDW 16.4 (H) 12.4 - 15.4 %    Platelets 743 612 - 213 K/uL    MPV 8.3 5.0 - 10.5 fL    Neutrophils % 68.6 %    Lymphocytes % 18.9 %    Monocytes % 9.5 %    Eosinophils % 2.7 %    Basophils % 0.3 %    Neutrophils Absolute 3.6 1.7 - 7.7 K/uL    Lymphocytes Absolute 1.0 1.0 - 5.1 K/uL    Monocytes Absolute 0.5 0.0 - 1.3 K/uL    Eosinophils Absolute 0.1 0.0 - 0.6 K/uL Basophils Absolute 0.0 0.0 - 0.2 K/uL   Protime-INR   Result Value Ref Range    Protime 10.9 10.0 - 13.2 sec    INR 0.94 0.86 - 1.14   Urinalysis, reflex to microscopic   Result Value Ref Range    Color, UA Yellow Straw/Yellow    Clarity, UA Clear Clear    Glucose, Ur Negative Negative mg/dL    Bilirubin Urine Negative Negative    Ketones, Urine Negative Negative mg/dL    Specific Gravity, UA 1.010 1.005 - 1.030    Blood, Urine Negative Negative    pH, UA 6.0 5.0 - 8.0    Protein, UA Negative Negative mg/dL    Urobilinogen, Urine 0.2 <2.0 E.U./dL    Nitrite, Urine Negative Negative    Leukocyte Esterase, Urine Negative Negative    Microscopic Examination Not Indicated     Urine Type NotGiven    TYPE AND SCREEN   Result Value Ref Range    ABO/Rh A POS     Antibody Screen NEG        RECENT VITALS:  BP: 139/66, Temp: 98.4 °F (36.9 °C),Pulse: 61, Resp: 18, SpO2: 97 %     Procedures     n/a    ED Course     Nursing Notes, Past Medical Hx, Past Surgical Hx, Social Hx, Allergies, and FamilyHx were reviewed. The patient was giventhe following medications:  No orders of the defined types were placed in this encounter. CONSULTS:  [facility] / ASSESSMENT / PLAN     Tayler Shabazz is a 68 y.o. female who is being evaluated for CHAPO on CKD with noted rt hydronephrosis on recent renal US. PCP asked patient to report to ED for stent placement. Will speak with Nephrology and Urology about management and possible admission for further work up. Labs are significant for Cr 1.4 (decreased from 2.0 1 month ago). CT abd confirmed right hydronephrosis with no identified reason for obstruction. Consult sent to Urology, spoke with Venessa Rinne, PA. She said since patient is asymptomatic, she can follow-up in outpatient with Urology this next week for cystoscopy and stent placement. Referral sent to Urology with Dr. Ivon Quintanilla.   Patient is medically stable and will be discharged with instructions to follow-up with PCP and Urology. This patient was also evaluated by the attending physician. All care plans were discussed and agreed upon. Clinical Impression     1. Hydronephrosis determined by ultrasound    2.  CHAPO (acute kidney injury) [facility])        Disposition     PATIENT REFERRED TO:  Clark Gutierres MD  [facility] [address]  [phone]    In 2 weeks  Vian[address] Peter [address] admission follow-up    Mary Beth Ugarte MD  [address] [place] [address]  [phone]    Schedule an appointment as soon as possible for a visit in 1 week  For cystoscopy and possible stent placement      DISCHARGE MEDICATIONS:  New Prescriptions    No medications on file       Alexandrea Agudelo MD  Resident  12/18/20 [id number]       Deep Lazar MD  Resident  12/18/20 [id number]

## 2020-12-21 RX ORDER — HYDROXYZINE HYDROCHLORIDE 25 MG/1
TABLET, FILM COATED ORAL
Qty: 120 TABLET | Refills: 0 | Status: SHIPPED | OUTPATIENT
Start: 2020-12-21 | End: 2021-03-09

## 2020-12-23 RX ORDER — ALPRAZOLAM 0.5 MG/1
TABLET ORAL
Qty: 30 TABLET | Refills: 0 | Status: SHIPPED | OUTPATIENT
Start: 2020-12-30 | End: 2021-01-28

## 2020-12-23 NOTE — TELEPHONE ENCOUNTER
I refilled this prescription today so that would be eligible to fill on 12/30/2020. Orders Placed This Encounter   Medications    ALPRAZolam (XANAX) 0.5 MG tablet     Sig: TAKE ONE TABLET BY MOUTH DAILY AS NEEDED FOR ANXIETY DO NOT TAKE WITH PAIN MEDICATION     Dispense:  30 tablet     Refill:  0     Controlled Substance Monitoring:    Acute and Chronic Pain Monitoring:   RX Monitoring 12/23/2020   Attestation -   Periodic Controlled Substance Monitoring No signs of potential drug abuse or diversion identified.    Chronic Pain > 80 MEDD -

## 2020-12-25 PROBLEM — Z00.00 PREVENTATIVE HEALTH CARE: Status: RESOLVED | Noted: 2017-04-03 | Resolved: 2020-12-25

## 2021-01-28 DIAGNOSIS — F32.A ANXIETY AND DEPRESSION: ICD-10-CM

## 2021-01-28 DIAGNOSIS — F41.9 ANXIETY AND DEPRESSION: ICD-10-CM

## 2021-01-28 RX ORDER — ALPRAZOLAM 0.5 MG/1
TABLET ORAL
Qty: 30 TABLET | Refills: 1 | Status: SHIPPED | OUTPATIENT
Start: 2021-01-28 | End: 2021-03-26

## 2021-03-09 RX ORDER — HYDROXYZINE HYDROCHLORIDE 25 MG/1
TABLET, FILM COATED ORAL
Qty: 120 TABLET | Refills: 0 | Status: SHIPPED
Start: 2021-03-09 | End: 2021-05-07 | Stop reason: DRUGHIGH

## 2021-03-30 ENCOUNTER — OFFICE VISIT (OUTPATIENT)
Dept: INTERNAL MEDICINE CLINIC | Age: 81
End: 2021-03-30
Payer: MEDICARE

## 2021-03-30 VITALS
DIASTOLIC BLOOD PRESSURE: 70 MMHG | WEIGHT: 137.4 LBS | SYSTOLIC BLOOD PRESSURE: 130 MMHG | BODY MASS INDEX: 25.28 KG/M2 | HEIGHT: 62 IN | TEMPERATURE: 98.7 F

## 2021-03-30 DIAGNOSIS — J43.8 OTHER EMPHYSEMA (HCC): ICD-10-CM

## 2021-03-30 DIAGNOSIS — I10 ESSENTIAL HYPERTENSION: ICD-10-CM

## 2021-03-30 DIAGNOSIS — Z01.818 PREOP EXAMINATION: ICD-10-CM

## 2021-03-30 DIAGNOSIS — Z01.818 PRE-OP EXAM: Primary | ICD-10-CM

## 2021-03-30 PROCEDURE — G8427 DOCREV CUR MEDS BY ELIG CLIN: HCPCS | Performed by: INTERNAL MEDICINE

## 2021-03-30 PROCEDURE — 1090F PRES/ABSN URINE INCON ASSESS: CPT | Performed by: INTERNAL MEDICINE

## 2021-03-30 PROCEDURE — 93000 ELECTROCARDIOGRAM COMPLETE: CPT | Performed by: INTERNAL MEDICINE

## 2021-03-30 PROCEDURE — 3023F SPIROM DOC REV: CPT | Performed by: INTERNAL MEDICINE

## 2021-03-30 PROCEDURE — G8417 CALC BMI ABV UP PARAM F/U: HCPCS | Performed by: INTERNAL MEDICINE

## 2021-03-30 PROCEDURE — G8926 SPIRO NO PERF OR DOC: HCPCS | Performed by: INTERNAL MEDICINE

## 2021-03-30 PROCEDURE — 4004F PT TOBACCO SCREEN RCVD TLK: CPT | Performed by: INTERNAL MEDICINE

## 2021-03-30 PROCEDURE — G8484 FLU IMMUNIZE NO ADMIN: HCPCS | Performed by: INTERNAL MEDICINE

## 2021-03-30 PROCEDURE — G8399 PT W/DXA RESULTS DOCUMENT: HCPCS | Performed by: INTERNAL MEDICINE

## 2021-03-30 PROCEDURE — 4040F PNEUMOC VAC/ADMIN/RCVD: CPT | Performed by: INTERNAL MEDICINE

## 2021-03-30 PROCEDURE — 1123F ACP DISCUSS/DSCN MKR DOCD: CPT | Performed by: INTERNAL MEDICINE

## 2021-03-30 PROCEDURE — 99214 OFFICE O/P EST MOD 30 MIN: CPT | Performed by: INTERNAL MEDICINE

## 2021-03-30 ASSESSMENT — PATIENT HEALTH QUESTIONNAIRE - PHQ9
2. FEELING DOWN, DEPRESSED OR HOPELESS: 0
SUM OF ALL RESPONSES TO PHQ QUESTIONS 1-9: 0

## 2021-03-30 ASSESSMENT — ENCOUNTER SYMPTOMS
BACK PAIN: 1
WHEEZING: 0
TROUBLE SWALLOWING: 0

## 2021-03-30 NOTE — PROGRESS NOTES
Subjective:      Patient ID: Luis Quintana is a 68 y.o. female. HPI Patient here today for preoperative evaluation. Patient scheduled for cystoscopy with right ureteroscopy and possible stent exchange. Original diagnosis of right-sided hydronephrosis. See Assessment for comments on acute and chronic medical problems and clearance evaluation. Review of Systems   Constitutional: Negative for fever. HENT: Negative for trouble swallowing. Respiratory: Negative for wheezing. Cardiovascular: Negative for chest pain. Genitourinary: Negative for difficulty urinating and hematuria. Musculoskeletal: Positive for arthralgias and back pain. Allergic/Immunologic: Negative for immunocompromised state. Neurological: Negative for seizures. Hematological: Does not bruise/bleed easily. Objective:   Physical Exam  Constitutional:       General: She is not in acute distress. Appearance: She is not ill-appearing, toxic-appearing or diaphoretic. HENT:      Head: Normocephalic and atraumatic. Mouth/Throat:      Pharynx: No oropharyngeal exudate or posterior oropharyngeal erythema. Eyes:      General:         Right eye: No discharge. Left eye: No discharge. Neck:      Musculoskeletal: Neck supple. Vascular: No carotid bruit. Cardiovascular:      Rate and Rhythm: Normal rate and regular rhythm. Heart sounds: No gallop. Pulmonary:      Effort: No respiratory distress. Breath sounds: Decreased breath sounds present. No wheezing, rhonchi or rales. Abdominal:      General: Bowel sounds are normal.      Palpations: Abdomen is soft. Musculoskeletal:      Right lower leg: No edema. Left lower leg: No edema. Lymphadenopathy:      Cervical: No cervical adenopathy. Skin:     Coloration: Skin is not jaundiced or pale. Neurological:      General: No focal deficit present. Mental Status: She is alert and oriented to person, place, and time.       Cranial Nerves: No cranial nerve deficit. Psychiatric:         Mood and Affect: Mood normal.         Thought Content: Thought content normal.         Judgment: Judgment normal.         Assessment / Plan:           Past Medical History:   Diagnosis Date    Anxiety     Colon polyps     check q5yrs    CVA (cerebral vascular accident) 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 Carl Heath - recheck 5 yrs    CYSTOSCOPY      ERCP  5/8/2015    sphincter and stent    MOUTH SURGERY      WRIST FRACTURE SURGERY Left 12/23/2016    OPEN REDUCTION INTERNAL FIXATION LEFT DISTAL RADIUS FRACTURE     Current Outpatient Medications   Medication Sig Dispense Refill    gabapentin (NEURONTIN) 300 MG capsule Take 300 mg by mouth 2 times daily.  hydrOXYzine (ATARAX) 25 MG tablet TAKE ONE TABLET BY MOUTH THREE TIMES A DAY AS NEEDED FOR ANXIETY AND TAKE 2 TABLETS BY MOUTH AT BEDTIME 120 tablet 0    atenolol (TENORMIN) 25 MG tablet Take 1 tablet by mouth daily 30 tablet 3    venlafaxine (EFFEXOR XR) 75 MG extended release capsule TAKE ONE CAPSULE BY MOUTH DAILY 90 capsule 2    furosemide (LASIX) 40 MG tablet TAKE ONE TABLET BY MOUTH DAILY (Patient taking differently: as needed ) 90 tablet 2    oxyCODONE-acetaminophen (PERCOCET)  MG per tablet Take 1 tablet by mouth every 6 hours as needed for Pain. Intended supply: 30 days. 90 tablet 0     No current facility-administered medications for this visit. Allergies   Allergen Reactions    Penicillins Swelling     Swelling in vaginal area only  Vaginal swelling     The patient has a family history of  shoulder  Assessment:   Patient here today for preoperative evaluation.     See Assessment for comments on acute and chronic medical problems and clearance evaluation. Encounter Diagnoses   Name Primary?  Pre-op exam Yes    Preop examination     Other emphysema ([address]     Essential hypertension        Preop examination  Patient scheduled for cystoscopy after original diagnosis of right hydronephrosis. She has had 2 stents placed. Tentatively scheduled for stent replacement and possible right ureteroscopy and evaluation for obstruction. OK FOR SURGERY. OK for planned procedure. Take medications as directed. Recent Covid test rejected is an adequate specimen. She may need rapid Covid testing preoperatively. Other emphysema ([address]  Stable. No acute disease. Cigarette smoker. Essential hypertension  BP okay. EKG stable with no acute disease. Plan:      Orders Placed This Encounter   Procedures    EKG 12 Lead     Order Specific Question:   Reason for Exam?     Answer: Other              OK FOR SURGERY/planned procedure. No history of anesthesia complications or reactions in patient or family. SH - noncontributory. No current signs of illness or COVID infection. Smoking history -cigarette smoker currently  ETOH history -social    Covid testing specimen rejected yesterday. Patient may need rapid Covid testing preoperatively.

## 2021-04-15 RX ORDER — SULFAMETHOXAZOLE AND TRIMETHOPRIM 800; 160 MG/1; MG/1
1 TABLET ORAL 2 TIMES DAILY
COMMUNITY
End: 2021-05-07 | Stop reason: ALTCHOICE

## 2021-04-15 RX ORDER — OXYCODONE AND ACETAMINOPHEN 7.5; 325 MG/1; MG/1
1 TABLET ORAL EVERY 6 HOURS PRN
Status: ON HOLD | COMMUNITY
End: 2021-04-23 | Stop reason: HOSPADM

## 2021-04-15 NOTE — PROGRESS NOTES
4. Please call [phone] option #2 option #2 if you have not been preregistered yet. On the day of your procedure bring your insurance card and photo ID. You will be registered at your bedside once brought back to your room. 5. DO NOT EAT ANYTHING eight hours prior to your arrival for surgery. May have 8 ounces of water 4 hours prior to your arrival for surgery. NOTE: ALL Gastric, Bariatric and Bowel surgery patients MUST follow their surgeon's instructions. 6. MEDICATIONS    Take the following medications with a SMALL sip of water:    Use your usual dose of inhalers the morning of surgery. BRING your rescue inhaler with you to hospital.    Anesthesia does NOT want you to take insulin the morning of surgery. They will control your blood sugar while you are at the hospital. Please contact your ordering physician for instructions regarding your insulin the night before your procedure. If you have an insulin pump, please keep it set on basal rate. 7. Do not swallow water when brushing teeth. No gum, candy, mints or ice chips. Refrain from smoking or at least decrease the amount. 8. Dress in loose, comfortable clothing appropriate for redressing after your procedure. Do not wear jewelry (including body piercings), make-up (especially NO eye make-up), fingernail polish (NO toenail polish if foot/leg surgery), lotion, powders or metal hairclips. 9. Dentures, glasses, or contacts will need to be removed before your procedure. Bring cases for your glasses, contacts, dentures, or hearing aids to protect them while you are in surgery. 10. If you use a CPAP, please bring it with you on the day of your procedure. 11. We recommend that valuable personal  belongings such as cash, cell phones, e-tablets or jewelry, be left at home during your stay. The hospital will not be responsible for valuables that are not secured in the hospital safe.  However, if your insurance requires a co-pay, you may want to bring a method of payment, i.e. Check or credit card, if you wish to pay your co-pay the day of surgery. 12. If you are to stay overnight, you may bring a bag with personal items. Please have any large items you may need brought in by your family after your arrival to your hospital room. 15. If you have a Living Will or Durable Power of , please bring a copy on the day of your procedure. 15. With your permission, one family member may accompany you while you are being prepared for surgery. Once you are ready, additional family members may join you. HOW WE KEEP YOU SAFE and WORK TO PREVENT SURGICAL SITE INFECTIONS:  1. Health care workers should always check your ID bracelet to verify your name and birth date. You will be asked many times to state your name, date of birth, and allergies. 2. Health care workers should always clean their hands with soap or alcohol gel before providing care to you. It is okay to ask anyone if they cleaned their hands before they touch you. 3. You will be actively involved in verifying the type of procedure you are having and ensuring the correct surgical site. This will be confirmed multiple times prior to your procedure. Do NOT aisha your surgery site UNLESS instructed to by your surgeon. 4. Do not shave or wax for 72 hours prior to procedure near your operative site. Shaving with a razor can irritate your skin and make it easier to develop an infection. On the day of your procedure, any hair that needs to be removed near the surgical site will be clipped by a healthcare worker using a special clippers designed to avoid skin irritation. 5. When you are in the operating room, your surgical site will be cleansed with a special soap, and in most cases, you will be given an antibiotic before the surgery begins. What to expect AFTER YOUR PROCEDURE:  1.  Immediately following your procedure, your will be taken to the PACU for the first phase of your recovery. Your nurse will help you recover from any potential side effects of anesthesia, such as extreme drowsiness, changes in your vital signs or breathing patterns. Nausea, headache, muscle aches, or sore throat may also occur after anesthesia. Your nurse will help you manage these potential side effects. 2. For comfort and safety, arrange to have someone at home with you for the first 24 hours after discharge. 3. You and your family will be given written instructions about your diet, activity, dressing care, medications, and return visits. 4. Once at home, should issues with nausea, pain, or bleeding occur, or should you notice any signs of infection, you should call your surgeon. 5. Always clean your hands before and after caring for your wound. Do not let your family touch your surgery site without cleaning their hands. 6. Narcotic pain medications can cause significant constipation. You may want to add a stool softener to your postoperative medication schedule or speak to your surgeon on how best to manage this SIDE EFFECT. SPECIAL INSTRUCTIONS     Thank you for allowing us to care for you. We strive to exceed your expectations in the delivery of care and service provided to you and your family. If you need to contact the [facility] [place] [address] staff for any reason, please call us at [phone]    Instructions reviewed with patient during preadmission testing phone interview. Michelle Hna. 4/15/2021 .9:17 AM      ADDITIONAL EDUCATIONAL INFORMATION REVIEWED PER PHONE WITH YOU AND/OR YOUR FAMILY:    Yes Antibacterial Soap

## 2021-04-16 ENCOUNTER — NURSE ONLY (OUTPATIENT)
Dept: PRIMARY CARE CLINIC | Age: 81
End: 2021-04-16
Payer: MEDICARE

## 2021-04-16 DIAGNOSIS — Z01.818 PREOP EXAMINATION: Primary | ICD-10-CM

## 2021-04-16 PROCEDURE — 99211 OFF/OP EST MAY X REQ PHY/QHP: CPT | Performed by: NURSE PRACTITIONER

## 2021-04-17 LAB — SARS-COV-2: NOT DETECTED

## 2021-04-20 ENCOUNTER — ANESTHESIA EVENT (OUTPATIENT)
Dept: OPERATING ROOM | Age: 81
DRG: 694 | End: 2021-04-20
Payer: MEDICARE

## 2021-04-20 NOTE — PROGRESS NOTES
[facility] / [facility] ([facility]) [address], [address] [place] [address]    Acknowledgment of Informed Consent for Surgical or Medical Procedure and Sedation  I agree to allow doctor(s) Misael Croft and his/her associates or assistants, including residents and/or other qualified medical practitioner to perform the following medical treatment or procedure and to administer or direct the administration of sedation as necessary:  Procedure(s): CYSTOSCOPY, RIGHT URETEROSCOPY, HOLMIUM LASER, STONE MANIPULATION, POSSIBLE RIGHT STENT EXCHANGE     My doctor has explained the following regarding the proposed procedure:   the explanation of the procedure   the benefits of the procedure   the potential problems that might occur during recuperation   the risks and side effects of the procedure which could include but are not limited to severe blood loss, infection, stroke or death   the benefits, risks and side effect of alternative procedures including the consequences of declining this procedure or any alternative procedures   the likelihood of achieving satisfactory results. I acknowledge no guarantee or assurance has been made to me regarding the results. I understand that during the course of this treatment/procedure, unforeseen conditions can occur which require an additional or different procedure. I agree to allow my physician or assistants to perform such extension of the original procedure as they may find necessary. I understand that sedation will often result in temporary impairment of memory and fine motor skills and that sedation can occasionally progress to a state of deep sedation or general anesthesia. I understand the risks of anesthesia for surgery include, but are not limited to, sore throat, hoarseness, injury to face, mouth, or teeth; nausea; headache; injury to blood vessels or nerves; death, brain damage, or paralysis.     I understand that if I have a Limitation of Treatment order in effect during my hospitalization, the order may or may not be in effect during this procedure. I give my doctor permission to give me blood or blood products. I understand that there are risks with receiving blood such as hepatitis, AIDS, fever, or allergic reaction. I acknowledge that the risks, benefits, and alternatives of this treatment have been explained to me and that no express or implied warranty has been given by the hospital, any blood bank, or any person or entity as to the blood or blood components transfused. At the discretion of my doctor, I agree to allow observers, equipment/product representatives and allow photographing, and/or televising of the procedure, provided my name or identity is maintained confidentially. I agree the hospital may dispose of or use for scientific or educational purposes any tissue, fluid, or body parts which may be removed.     ________________________________Date________Time______ am/pm  ([place] [employer] [place] [employer])  Patient or Signature of Closest Relative or Legal Guardian    ________________________________Date________Time______am/pm      Page 1 of  1  Witness

## 2021-04-21 ENCOUNTER — APPOINTMENT (OUTPATIENT)
Dept: GENERAL RADIOLOGY | Age: 81
DRG: 694 | End: 2021-04-21
Attending: STUDENT IN AN ORGANIZED HEALTH CARE EDUCATION/TRAINING PROGRAM
Payer: MEDICARE

## 2021-04-21 ENCOUNTER — HOSPITAL ENCOUNTER (INPATIENT)
Age: 81
LOS: 2 days | Discharge: HOME | DRG: 694 | End: 2021-04-23
Attending: STUDENT IN AN ORGANIZED HEALTH CARE EDUCATION/TRAINING PROGRAM | Admitting: STUDENT IN AN ORGANIZED HEALTH CARE EDUCATION/TRAINING PROGRAM
Payer: MEDICARE

## 2021-04-21 ENCOUNTER — ANESTHESIA (OUTPATIENT)
Dept: OPERATING ROOM | Age: 81
DRG: 694 | End: 2021-04-21
Payer: MEDICARE

## 2021-04-21 VITALS — SYSTOLIC BLOOD PRESSURE: 103 MMHG | OXYGEN SATURATION: 100 % | TEMPERATURE: 94.8 F | DIASTOLIC BLOOD PRESSURE: 52 MMHG

## 2021-04-21 DIAGNOSIS — N13.30 HYDRONEPHROSIS, UNSPECIFIED HYDRONEPHROSIS TYPE: ICD-10-CM

## 2021-04-21 PROBLEM — N17.9 AKI (ACUTE KIDNEY INJURY) (HCC): Status: ACTIVE | Noted: 2021-04-21

## 2021-04-21 LAB
ANION GAP SERPL CALCULATED.3IONS-SCNC: 10 MMOL/L (ref 3–16)
ANION GAP SERPL CALCULATED.3IONS-SCNC: 8 MMOL/L (ref 3–16)
BUN BLDV-MCNC: 43 MG/DL (ref 7–20)
BUN BLDV-MCNC: 45 MG/DL (ref 7–20)
CALCIUM SERPL-MCNC: 8.7 MG/DL (ref 8.3–10.6)
CALCIUM SERPL-MCNC: 9.4 MG/DL (ref 8.3–10.6)
CHLORIDE BLD-SCNC: 103 MMOL/L (ref 99–110)
CHLORIDE BLD-SCNC: 108 MMOL/L (ref 99–110)
CO2: 21 MMOL/L (ref 21–32)
CO2: 21 MMOL/L (ref 21–32)
CREAT SERPL-MCNC: 2.7 MG/DL (ref 0.6–1.2)
CREAT SERPL-MCNC: 2.7 MG/DL (ref 0.6–1.2)
GFR AFRICAN AMERICAN: 21
GFR AFRICAN AMERICAN: 21
GFR NON-AFRICAN AMERICAN: 17
GFR NON-AFRICAN AMERICAN: 17
GLUCOSE BLD-MCNC: 108 MG/DL (ref 70–99)
GLUCOSE BLD-MCNC: 92 MG/DL (ref 70–99)
HCT VFR BLD CALC: 35.2 % (ref 36–48)
HEMOGLOBIN: 11.6 G/DL (ref 12–16)
MCH RBC QN AUTO: 29.4 PG (ref 26–34)
MCHC RBC AUTO-ENTMCNC: 33 G/DL (ref 31–36)
MCV RBC AUTO: 89.3 FL (ref 80–100)
PDW BLD-RTO: 14.9 % (ref 12.4–15.4)
PLATELET # BLD: 249 K/UL (ref 135–450)
PMV BLD AUTO: 8.4 FL (ref 5–10.5)
POTASSIUM SERPL-SCNC: 5.4 MMOL/L (ref 3.5–5.1)
POTASSIUM SERPL-SCNC: 6.1 MMOL/L (ref 3.5–5.1)
RBC # BLD: 3.94 M/UL (ref 4–5.2)
SODIUM BLD-SCNC: 134 MMOL/L (ref 136–145)
SODIUM BLD-SCNC: 137 MMOL/L (ref 136–145)
WBC # BLD: 6 K/UL (ref 4–11)

## 2021-04-21 PROCEDURE — 1200000000 HC SEMI PRIVATE

## 2021-04-21 PROCEDURE — C1758 CATHETER, URETERAL: HCPCS | Performed by: STUDENT IN AN ORGANIZED HEALTH CARE EDUCATION/TRAINING PROGRAM

## 2021-04-21 PROCEDURE — 88305 TISSUE EXAM BY PATHOLOGIST: CPT

## 2021-04-21 PROCEDURE — 2500000003 HC RX 250 WO HCPCS: Performed by: NURSE ANESTHETIST, CERTIFIED REGISTERED

## 2021-04-21 PROCEDURE — C1769 GUIDE WIRE: HCPCS | Performed by: STUDENT IN AN ORGANIZED HEALTH CARE EDUCATION/TRAINING PROGRAM

## 2021-04-21 PROCEDURE — 7100000000 HC PACU RECOVERY - FIRST 15 MIN: Performed by: STUDENT IN AN ORGANIZED HEALTH CARE EDUCATION/TRAINING PROGRAM

## 2021-04-21 PROCEDURE — 80048 BASIC METABOLIC PNL TOTAL CA: CPT

## 2021-04-21 PROCEDURE — 74420 UROGRAPHY RTRGR +-KUB: CPT

## 2021-04-21 PROCEDURE — 2709999900 HC NON-CHARGEABLE SUPPLY: Performed by: STUDENT IN AN ORGANIZED HEALTH CARE EDUCATION/TRAINING PROGRAM

## 2021-04-21 PROCEDURE — 3700000000 HC ANESTHESIA ATTENDED CARE: Performed by: STUDENT IN AN ORGANIZED HEALTH CARE EDUCATION/TRAINING PROGRAM

## 2021-04-21 PROCEDURE — 6370000000 HC RX 637 (ALT 250 FOR IP): Performed by: STUDENT IN AN ORGANIZED HEALTH CARE EDUCATION/TRAINING PROGRAM

## 2021-04-21 PROCEDURE — G0378 HOSPITAL OBSERVATION PER HR: HCPCS

## 2021-04-21 PROCEDURE — BT1D1ZZ FLUOROSCOPY OF RIGHT KIDNEY, URETER AND BLADDER USING LOW OSMOLAR CONTRAST: ICD-10-PCS | Performed by: STUDENT IN AN ORGANIZED HEALTH CARE EDUCATION/TRAINING PROGRAM

## 2021-04-21 PROCEDURE — 0TB68ZX EXCISION OF RIGHT URETER, VIA NATURAL OR ARTIFICIAL OPENING ENDOSCOPIC, DIAGNOSTIC: ICD-10-PCS | Performed by: STUDENT IN AN ORGANIZED HEALTH CARE EDUCATION/TRAINING PROGRAM

## 2021-04-21 PROCEDURE — 51701 INSERT BLADDER CATHETER: CPT

## 2021-04-21 PROCEDURE — 3700000001 HC ADD 15 MINUTES (ANESTHESIA): Performed by: STUDENT IN AN ORGANIZED HEALTH CARE EDUCATION/TRAINING PROGRAM

## 2021-04-21 PROCEDURE — 2580000003 HC RX 258: Performed by: NURSE ANESTHETIST, CERTIFIED REGISTERED

## 2021-04-21 PROCEDURE — 3600000004 HC SURGERY LEVEL 4 BASE: Performed by: STUDENT IN AN ORGANIZED HEALTH CARE EDUCATION/TRAINING PROGRAM

## 2021-04-21 PROCEDURE — 2720000010 HC SURG SUPPLY STERILE: Performed by: STUDENT IN AN ORGANIZED HEALTH CARE EDUCATION/TRAINING PROGRAM

## 2021-04-21 PROCEDURE — C2617 STENT, NON-COR, TEM W/O DEL: HCPCS | Performed by: STUDENT IN AN ORGANIZED HEALTH CARE EDUCATION/TRAINING PROGRAM

## 2021-04-21 PROCEDURE — 0TP98DZ REMOVAL OF INTRALUMINAL DEVICE FROM URETER, VIA NATURAL OR ARTIFICIAL OPENING ENDOSCOPIC: ICD-10-PCS | Performed by: STUDENT IN AN ORGANIZED HEALTH CARE EDUCATION/TRAINING PROGRAM

## 2021-04-21 PROCEDURE — 6360000002 HC RX W HCPCS: Performed by: NURSE ANESTHETIST, CERTIFIED REGISTERED

## 2021-04-21 PROCEDURE — 7100000001 HC PACU RECOVERY - ADDTL 15 MIN: Performed by: STUDENT IN AN ORGANIZED HEALTH CARE EDUCATION/TRAINING PROGRAM

## 2021-04-21 PROCEDURE — 85027 COMPLETE CBC AUTOMATED: CPT

## 2021-04-21 PROCEDURE — 51798 US URINE CAPACITY MEASURE: CPT

## 2021-04-21 PROCEDURE — 2580000003 HC RX 258: Performed by: STUDENT IN AN ORGANIZED HEALTH CARE EDUCATION/TRAINING PROGRAM

## 2021-04-21 PROCEDURE — 3600000014 HC SURGERY LEVEL 4 ADDTL 15MIN: Performed by: STUDENT IN AN ORGANIZED HEALTH CARE EDUCATION/TRAINING PROGRAM

## 2021-04-21 PROCEDURE — 99223 1ST HOSP IP/OBS HIGH 75: CPT | Performed by: INTERNAL MEDICINE

## 2021-04-21 PROCEDURE — 6360000002 HC RX W HCPCS: Performed by: STUDENT IN AN ORGANIZED HEALTH CARE EDUCATION/TRAINING PROGRAM

## 2021-04-21 PROCEDURE — 0T763DZ DILATION OF RIGHT URETER WITH INTRALUMINAL DEVICE, PERCUTANEOUS APPROACH: ICD-10-PCS | Performed by: STUDENT IN AN ORGANIZED HEALTH CARE EDUCATION/TRAINING PROGRAM

## 2021-04-21 PROCEDURE — 2580000003 HC RX 258: Performed by: ANESTHESIOLOGY

## 2021-04-21 DEVICE — STENT URET 6FR L24CM PERCFLX HYDR+ DBL PGTL THRD 2: Type: IMPLANTABLE DEVICE | Site: URETER | Status: FUNCTIONAL

## 2021-04-21 RX ORDER — ONDANSETRON 2 MG/ML
INJECTION INTRAMUSCULAR; INTRAVENOUS PRN
Status: DISCONTINUED | OUTPATIENT
Start: 2021-04-21 | End: 2021-04-21 | Stop reason: SDUPTHER

## 2021-04-21 RX ORDER — MAGNESIUM HYDROXIDE 1200 MG/15ML
LIQUID ORAL CONTINUOUS PRN
Status: COMPLETED | OUTPATIENT
Start: 2021-04-21 | End: 2021-04-21

## 2021-04-21 RX ORDER — FENTANYL CITRATE 50 UG/ML
25 INJECTION, SOLUTION INTRAMUSCULAR; INTRAVENOUS EVERY 5 MIN PRN
Status: DISCONTINUED | OUTPATIENT
Start: 2021-04-21 | End: 2021-04-21 | Stop reason: HOSPADM

## 2021-04-21 RX ORDER — OXYCODONE HYDROCHLORIDE AND ACETAMINOPHEN 5; 325 MG/1; MG/1
1 TABLET ORAL PRN
Status: DISCONTINUED | OUTPATIENT
Start: 2021-04-21 | End: 2021-04-21 | Stop reason: HOSPADM

## 2021-04-21 RX ORDER — SODIUM CHLORIDE 9 MG/ML
INJECTION, SOLUTION INTRAVENOUS CONTINUOUS
Status: DISCONTINUED | OUTPATIENT
Start: 2021-04-21 | End: 2021-04-21

## 2021-04-21 RX ORDER — EPHEDRINE SULFATE 50 MG/ML
INJECTION INTRAVENOUS PRN
Status: DISCONTINUED | OUTPATIENT
Start: 2021-04-21 | End: 2021-04-21 | Stop reason: SDUPTHER

## 2021-04-21 RX ORDER — DIPHENHYDRAMINE HYDROCHLORIDE 50 MG/ML
12.5 INJECTION INTRAMUSCULAR; INTRAVENOUS
Status: DISCONTINUED | OUTPATIENT
Start: 2021-04-21 | End: 2021-04-21 | Stop reason: HOSPADM

## 2021-04-21 RX ORDER — CIPROFLOXACIN 2 MG/ML
400 INJECTION, SOLUTION INTRAVENOUS ONCE
Status: COMPLETED | OUTPATIENT
Start: 2021-04-21 | End: 2021-04-21

## 2021-04-21 RX ORDER — ONDANSETRON 2 MG/ML
4 INJECTION INTRAMUSCULAR; INTRAVENOUS EVERY 6 HOURS PRN
Status: DISCONTINUED | OUTPATIENT
Start: 2021-04-21 | End: 2021-04-23 | Stop reason: HOSPADM

## 2021-04-21 RX ORDER — SODIUM CHLORIDE 9 MG/ML
25 INJECTION, SOLUTION INTRAVENOUS PRN
Status: DISCONTINUED | OUTPATIENT
Start: 2021-04-21 | End: 2021-04-21 | Stop reason: HOSPADM

## 2021-04-21 RX ORDER — SENNA AND DOCUSATE SODIUM 50; 8.6 MG/1; MG/1
2 TABLET, FILM COATED ORAL 2 TIMES DAILY
Status: DISCONTINUED | OUTPATIENT
Start: 2021-04-21 | End: 2021-04-23 | Stop reason: HOSPADM

## 2021-04-21 RX ORDER — SODIUM CHLORIDE 9 MG/ML
INJECTION, SOLUTION INTRAVENOUS CONTINUOUS PRN
Status: DISCONTINUED | OUTPATIENT
Start: 2021-04-21 | End: 2021-04-21 | Stop reason: SDUPTHER

## 2021-04-21 RX ORDER — VENLAFAXINE HYDROCHLORIDE 75 MG/1
75 CAPSULE, EXTENDED RELEASE ORAL
Status: DISCONTINUED | OUTPATIENT
Start: 2021-04-22 | End: 2021-04-23 | Stop reason: HOSPADM

## 2021-04-21 RX ORDER — ONDANSETRON 2 MG/ML
4 INJECTION INTRAMUSCULAR; INTRAVENOUS
Status: DISCONTINUED | OUTPATIENT
Start: 2021-04-21 | End: 2021-04-21 | Stop reason: HOSPADM

## 2021-04-21 RX ORDER — SODIUM CHLORIDE 9 MG/ML
25 INJECTION, SOLUTION INTRAVENOUS PRN
Status: DISCONTINUED | OUTPATIENT
Start: 2021-04-21 | End: 2021-04-23 | Stop reason: HOSPADM

## 2021-04-21 RX ORDER — SODIUM CHLORIDE 0.9 % (FLUSH) 0.9 %
10 SYRINGE (ML) INJECTION EVERY 12 HOURS SCHEDULED
Status: DISCONTINUED | OUTPATIENT
Start: 2021-04-21 | End: 2021-04-21 | Stop reason: HOSPADM

## 2021-04-21 RX ORDER — OXYCODONE AND ACETAMINOPHEN 7.5; 325 MG/1; MG/1
1 TABLET ORAL EVERY 6 HOURS PRN
Status: DISCONTINUED | OUTPATIENT
Start: 2021-04-21 | End: 2021-04-23 | Stop reason: HOSPADM

## 2021-04-21 RX ORDER — GLYCOPYRROLATE 0.2 MG/ML
INJECTION INTRAMUSCULAR; INTRAVENOUS PRN
Status: DISCONTINUED | OUTPATIENT
Start: 2021-04-21 | End: 2021-04-21 | Stop reason: SDUPTHER

## 2021-04-21 RX ORDER — HYDRALAZINE HYDROCHLORIDE 20 MG/ML
5 INJECTION INTRAMUSCULAR; INTRAVENOUS EVERY 10 MIN PRN
Status: DISCONTINUED | OUTPATIENT
Start: 2021-04-21 | End: 2021-04-21 | Stop reason: HOSPADM

## 2021-04-21 RX ORDER — LABETALOL HYDROCHLORIDE 5 MG/ML
5 INJECTION, SOLUTION INTRAVENOUS EVERY 10 MIN PRN
Status: DISCONTINUED | OUTPATIENT
Start: 2021-04-21 | End: 2021-04-21 | Stop reason: HOSPADM

## 2021-04-21 RX ORDER — ATENOLOL 25 MG/1
25 TABLET ORAL DAILY
Status: DISCONTINUED | OUTPATIENT
Start: 2021-04-22 | End: 2021-04-23 | Stop reason: HOSPADM

## 2021-04-21 RX ORDER — SODIUM CHLORIDE 0.9 % (FLUSH) 0.9 %
10 SYRINGE (ML) INJECTION PRN
Status: DISCONTINUED | OUTPATIENT
Start: 2021-04-21 | End: 2021-04-21 | Stop reason: HOSPADM

## 2021-04-21 RX ORDER — SODIUM CHLORIDE 0.9 % (FLUSH) 0.9 %
5-40 SYRINGE (ML) INJECTION PRN
Status: DISCONTINUED | OUTPATIENT
Start: 2021-04-21 | End: 2021-04-23 | Stop reason: HOSPADM

## 2021-04-21 RX ORDER — PROMETHAZINE HYDROCHLORIDE 25 MG/1
12.5 TABLET ORAL EVERY 6 HOURS PRN
Status: DISCONTINUED | OUTPATIENT
Start: 2021-04-21 | End: 2021-04-23 | Stop reason: HOSPADM

## 2021-04-21 RX ORDER — FENTANYL CITRATE 50 UG/ML
50 INJECTION, SOLUTION INTRAMUSCULAR; INTRAVENOUS EVERY 5 MIN PRN
Status: DISCONTINUED | OUTPATIENT
Start: 2021-04-21 | End: 2021-04-21 | Stop reason: HOSPADM

## 2021-04-21 RX ORDER — SODIUM CHLORIDE 0.9 % (FLUSH) 0.9 %
5-40 SYRINGE (ML) INJECTION EVERY 12 HOURS SCHEDULED
Status: DISCONTINUED | OUTPATIENT
Start: 2021-04-21 | End: 2021-04-23 | Stop reason: HOSPADM

## 2021-04-21 RX ORDER — OXYCODONE HYDROCHLORIDE AND ACETAMINOPHEN 5; 325 MG/1; MG/1
2 TABLET ORAL PRN
Status: DISCONTINUED | OUTPATIENT
Start: 2021-04-21 | End: 2021-04-21 | Stop reason: HOSPADM

## 2021-04-21 RX ORDER — SODIUM CHLORIDE, SODIUM LACTATE, POTASSIUM CHLORIDE, CALCIUM CHLORIDE 600; 310; 30; 20 MG/100ML; MG/100ML; MG/100ML; MG/100ML
INJECTION, SOLUTION INTRAVENOUS CONTINUOUS
Status: DISCONTINUED | OUTPATIENT
Start: 2021-04-21 | End: 2021-04-21

## 2021-04-21 RX ORDER — FENTANYL CITRATE 50 UG/ML
INJECTION, SOLUTION INTRAMUSCULAR; INTRAVENOUS PRN
Status: DISCONTINUED | OUTPATIENT
Start: 2021-04-21 | End: 2021-04-21 | Stop reason: SDUPTHER

## 2021-04-21 RX ORDER — SODIUM CHLORIDE 9 MG/ML
INJECTION, SOLUTION INTRAVENOUS CONTINUOUS
Status: DISCONTINUED | OUTPATIENT
Start: 2021-04-21 | End: 2021-04-23

## 2021-04-21 RX ORDER — PROPOFOL 10 MG/ML
INJECTION, EMULSION INTRAVENOUS PRN
Status: DISCONTINUED | OUTPATIENT
Start: 2021-04-21 | End: 2021-04-21 | Stop reason: SDUPTHER

## 2021-04-21 RX ORDER — LIDOCAINE HYDROCHLORIDE 20 MG/ML
INJECTION, SOLUTION EPIDURAL; INFILTRATION; INTRACAUDAL; PERINEURAL PRN
Status: DISCONTINUED | OUTPATIENT
Start: 2021-04-21 | End: 2021-04-21 | Stop reason: SDUPTHER

## 2021-04-21 RX ORDER — MEPERIDINE HYDROCHLORIDE 25 MG/ML
12.5 INJECTION INTRAMUSCULAR; INTRAVENOUS; SUBCUTANEOUS EVERY 5 MIN PRN
Status: DISCONTINUED | OUTPATIENT
Start: 2021-04-21 | End: 2021-04-21 | Stop reason: HOSPADM

## 2021-04-21 RX ORDER — HYDROXYZINE HYDROCHLORIDE 10 MG/1
25 TABLET, FILM COATED ORAL 2 TIMES DAILY PRN
Status: DISCONTINUED | OUTPATIENT
Start: 2021-04-21 | End: 2021-04-23 | Stop reason: HOSPADM

## 2021-04-21 RX ORDER — PROCHLORPERAZINE EDISYLATE 5 MG/ML
5 INJECTION INTRAMUSCULAR; INTRAVENOUS
Status: DISCONTINUED | OUTPATIENT
Start: 2021-04-21 | End: 2021-04-21 | Stop reason: HOSPADM

## 2021-04-21 RX ADMIN — SODIUM CHLORIDE: 9 INJECTION, SOLUTION INTRAVENOUS at 13:47

## 2021-04-21 RX ADMIN — SODIUM CHLORIDE, POTASSIUM CHLORIDE, SODIUM LACTATE AND CALCIUM CHLORIDE: 600; 310; 30; 20 INJECTION, SOLUTION INTRAVENOUS at 12:59

## 2021-04-21 RX ADMIN — EPHEDRINE SULFATE 10 MG: 50 INJECTION INTRAVENOUS at 14:08

## 2021-04-21 RX ADMIN — EPHEDRINE SULFATE 10 MG: 50 INJECTION INTRAVENOUS at 13:55

## 2021-04-21 RX ADMIN — SODIUM CHLORIDE 600 ML: 9 INJECTION, SOLUTION INTRAVENOUS at 16:30

## 2021-04-21 RX ADMIN — OXYCODONE HYDROCHLORIDE AND ACETAMINOPHEN 1 TABLET: 7.5; 325 TABLET ORAL at 18:44

## 2021-04-21 RX ADMIN — EPHEDRINE SULFATE 10 MG: 50 INJECTION INTRAVENOUS at 14:14

## 2021-04-21 RX ADMIN — ONDANSETRON 4 MG: 2 INJECTION INTRAMUSCULAR; INTRAVENOUS at 14:18

## 2021-04-21 RX ADMIN — FENTANYL CITRATE 50 MCG: 50 INJECTION, SOLUTION INTRAMUSCULAR; INTRAVENOUS at 14:19

## 2021-04-21 RX ADMIN — EPHEDRINE SULFATE 5 MG: 50 INJECTION INTRAVENOUS at 13:43

## 2021-04-21 RX ADMIN — EPHEDRINE SULFATE 5 MG: 50 INJECTION INTRAVENOUS at 13:45

## 2021-04-21 RX ADMIN — PROPOFOL 100 MG: 10 INJECTION, EMULSION INTRAVENOUS at 13:27

## 2021-04-21 RX ADMIN — GLYCOPYRROLATE 0.2 MG: 0.2 INJECTION INTRAMUSCULAR; INTRAVENOUS at 13:25

## 2021-04-21 RX ADMIN — SODIUM CHLORIDE: 9 INJECTION, SOLUTION INTRAVENOUS at 21:04

## 2021-04-21 RX ADMIN — Medication 10 ML: at 21:05

## 2021-04-21 RX ADMIN — LIDOCAINE HYDROCHLORIDE 50 MG: 20 INJECTION, SOLUTION EPIDURAL; INFILTRATION; INTRACAUDAL; PERINEURAL at 13:27

## 2021-04-21 RX ADMIN — CIPROFLOXACIN 400 MG: 2 INJECTION, SOLUTION INTRAVENOUS at 13:29

## 2021-04-21 RX ADMIN — BISACODYL 5 MG: 5 TABLET, COATED ORAL at 18:44

## 2021-04-21 RX ADMIN — FENTANYL CITRATE 50 MCG: 50 INJECTION, SOLUTION INTRAMUSCULAR; INTRAVENOUS at 13:22

## 2021-04-21 RX ADMIN — EPHEDRINE SULFATE 10 MG: 50 INJECTION INTRAVENOUS at 14:06

## 2021-04-21 ASSESSMENT — PULMONARY FUNCTION TESTS
PIF_VALUE: 3
PIF_VALUE: 3
PIF_VALUE: 1
PIF_VALUE: 3
PIF_VALUE: 3
PIF_VALUE: 0
PIF_VALUE: 3
PIF_VALUE: 0
PIF_VALUE: 3
PIF_VALUE: 1
PIF_VALUE: 3
PIF_VALUE: 0
PIF_VALUE: 3
PIF_VALUE: 0
PIF_VALUE: 3
PIF_VALUE: 8
PIF_VALUE: 3
PIF_VALUE: 3
PIF_VALUE: 2
PIF_VALUE: 3
PIF_VALUE: 3
PIF_VALUE: 7
PIF_VALUE: 2
PIF_VALUE: 8
PIF_VALUE: 3

## 2021-04-21 ASSESSMENT — PAIN SCALES - GENERAL
PAINLEVEL_OUTOF10: 0
PAINLEVEL_OUTOF10: 4
PAINLEVEL_OUTOF10: 0

## 2021-04-21 ASSESSMENT — ENCOUNTER SYMPTOMS: SHORTNESS OF BREATH: 0

## 2021-04-21 ASSESSMENT — PAIN DESCRIPTION - LOCATION: LOCATION: BACK

## 2021-04-21 ASSESSMENT — PAIN DESCRIPTION - PAIN TYPE: TYPE: CHRONIC PAIN

## 2021-04-21 ASSESSMENT — PAIN DESCRIPTION - ORIENTATION: ORIENTATION: LOWER

## 2021-04-21 ASSESSMENT — PAIN DESCRIPTION - ONSET: ONSET: ON-GOING

## 2021-04-21 NOTE — PROGRESS NOTES
Pt to \[facility]\"" for cystoscopy and additional other surgeries with Dr. Evi Arzate. Pt is alert; oriented X 4; speech clear; breathing easily on RA; slightly [place] when asked questions. Pt has hx of stroke and walks with a cane, but gait is extremely unsteady and requires nurse or PCA at pt's side at all times for transfers. Dr. Evi Arzate was at bedside and stated no urine needed or labs for pt as pt stated she had same done at [facility] 2 weeks ago. #20 IV placed in left AC after 2 sticks, and CBC and BMP drawn and sent to lab per request of Dr. Conner Fenton of anesthesiology. At 1330 this RN received call from lab stating pt's potassium was 6.1 and blood was not hemolyzed per Darby Bergman in lab. Called Dr. Conner Fenton and notified her of the results and other BMP results. Pt now in OR.

## 2021-04-21 NOTE — CONSULTS
Hospitalist Consultation Note    Patient's PCP: Dr. Adrian De Los Santos MD     Requesting Physician: Dr. Mila Walker MD    Reason for Consultation: Assistance with medical management    HISTORY OF PRESENT ILLNESS:    This is a very pleasant 68 y.o. female with a history of chronic pain, narcotic dependence, hypertension, hyperlipidemia, CVA and kidney stones who was admitted for history of obstructive hydronephrosis with previous stent placement, by the urology service. We were asked to see her with a view to assist in medical management. She appears to have had hydronephrosis for several months with imaging in December 2020 showing: Marked hydronephrosis on the right of uncertain etiology. No obstructing calculus is identified. Findings may relate to nonvisualized stone, stricture or neoplasm. Of note also, was dilatation of the common bile duct, indeterminate. She had CYSTOSCOPY, RIGHT URETEROSCOPY, WITH RETROGRADE, RIGHT URETERAL BIOPSY,  RIGHT STENT EXCHANGE 4/21/2021. Procedure was under general anesthesia with minimal blood loss. Prior to and since the procedure she has not had any chest pain, shortness of breath, dizziness or lightheadedness. She denies fever or chills. Currently she is sitting up in bed with no new complaints.         Past Medical / Surgical History:    Past Medical History:   Diagnosis Date    Anxiety     Colon polyps     check q5yrs    CVA (cerebral vascular accident) ([address] 10/12/2018    Lacunar, left hemisphere    HTN (hypertension) 10/11    Hyperlipidemia     Kidney stones     Proteinuria 10/11    Screening mammogram 2010    benign       Past Surgical History:   Procedure Laterality Date    COLONOSCOPY  2010    Dr. Carmen Amaya - recheck 5 yrs    CYSTOSCOPY      ERCP  5/8/2015    sphincter and stent    EYE SURGERY Bilateral     cataract removal    MOUTH SURGERY      WRIST FRACTURE SURGERY Left 12/23/2016    OPEN REDUCTION INTERNAL FIXATION LEFT DISTAL RADIUS FRACTURE       Medications Prior to Admission:    No current facility-administered medications on file prior to encounter. Current Outpatient Medications on File Prior to Encounter   Medication Sig Dispense Refill    oxyCODONE-acetaminophen (PERCOCET) 7.5-325 MG per tablet Take 1 tablet by mouth every 6 hours as needed for Pain.  sulfamethoxazole-trimethoprim (BACTRIM DS;SEPTRA DS) 800-160 MG per tablet Take 1 tablet by mouth 2 times daily      gabapentin (NEURONTIN) 300 MG capsule Take 300 mg by mouth 2 times daily as needed.  hydrOXYzine (ATARAX) 25 MG tablet TAKE ONE TABLET BY MOUTH THREE TIMES A DAY AS NEEDED FOR ANXIETY AND TAKE 2 TABLETS BY MOUTH AT BEDTIME 120 tablet 0    venlafaxine (EFFEXOR XR) 75 MG extended release capsule TAKE ONE CAPSULE BY MOUTH DAILY 90 capsule 2    furosemide (LASIX) 40 MG tablet TAKE ONE TABLET BY MOUTH DAILY (Patient taking differently: as needed ) 90 tablet 2       Allergies:  Penicillins    Social History:   TOBACCO:   reports that she has been smoking cigarettes. She started smoking about 63 years ago. She has a 50.00 pack-year smoking history. She has never used smokeless tobacco.     ETOH:   reports no history of alcohol use. Family History:       Problem Relation Age of Onset    Diabetes Mother     Heart Disease Father     Cancer Brother     Kidney Disease Brother        ROS: Review of Systems - Negative except as in HPI. All other systems reviewed and are negative. PHYSICAL EXAM:  /65   Pulse 71   Temp 98.2 °F (36.8 °C) (Oral)   Resp 13   Ht 5' 1\" (1.549 m)   Wt 130 lb (59 kg)   SpO2 97%   BMI 24.56 kg/m²     No results for input(s): POCGLU in the last 72 hours.     General appearance: alert, appears stated age and cooperative  Neck: no adenopathy, no carotid bruit, no JVD, supple, symmetrical, trachea midline and thyroid not enlarged, symmetric, no tenderness/mass/nodules  Lungs: clear to auscultation bilaterally  Heart: common bile duct as described present previously similar indeterminate  -Liver enzymes appear to have been within normal level during that time  -Update liver enzymes      Chronic pain, narcotic dependence  -On Percocet chronically  -Limit narcotics as able  -Bowel medications      Hypertension:   -Continue atenolol   -Monitor BPs          Thank you Dr. Parrish Owens MD for the opportunity to be involved in this patients care. If you have any questions or concerns please feel free to contact me at [phone].

## 2021-04-21 NOTE — PROGRESS NOTES
PACU Transfer Note    Vitals:    04/21/21 1700   BP: (!) 108/58   Pulse: 67   Resp: 13   Temp: 97.5 °F (36.4 °C)   SpO2: 100%     BP within 20% of pre op level. Asymptomatic. Does not meet criteria for treatment. In: 130 [P.O.:75; I.V.:55]  Out: -     Pain assessment:  No pain  Pain Level: 0    Report given to Receiving unit RN. Report given to [facility] at bedside. Report included pt history, surgical procedure, pre op and post op labs, plan of care and consults ordered, head to toe assessment, VS and pain level.   She verbalized understanding.   4/21/2021 5:19 PM

## 2021-04-21 NOTE — PROGRESS NOTES
Dr. Lucy Wayne at bedside, plan will be to admit for further evaluation of kidney function. Pt agrees with this plan. He will be placing orders shortly and will also speak with the patient's friend Ramon Sousa.

## 2021-04-21 NOTE — PROGRESS NOTES
Spoke with Dr. Rosio Cavazos about discharge orders and instructions, he would like to obtain a BMP on pt since her pre op BMP revealed a Potassium level of 6.1. Pt is aware of plan of care as well as her friend Edwin Reyes.

## 2021-04-21 NOTE — PROGRESS NOTES
[facility] Pain 9/10-given percocet (generalized/chronic/surgical). Skin noted to have redness/scabs (BLE). Pt has 8 cats (scratches). Rm air. A+Ox4. Bed alarm on. Call light in reach.  Dinner ordered

## 2021-04-21 NOTE — PROGRESS NOTES
From OR to [facility] accompanied by Donna Velez and monitors applied. Intraop meds discussed and pre op lab levels. Pt arrives able to state needs and denies pain.

## 2021-04-21 NOTE — ANESTHESIA PRE PROCEDURE
Department of Anesthesiology  Preprocedure Note       Name:  Charley Cope   Age:  68 y.o.  :  1940                                          MRN:  [id number]         Date:  2021      Surgeon: Rashaad Anne):  Mila Walker MD    Procedure: Procedure(s):  CYSTOSCOPY, RIGHT URETEROSCOPY, HOLMIUM LASER, STONE MANIPULATION, POSSIBLE RIGHT STENT EXCHANGE    Medications prior to admission:   Prior to Admission medications    Medication Sig Start Date End Date Taking? Authorizing Provider   sulfamethoxazole-trimethoprim (BACTRIM DS;SEPTRA DS) 800-160 MG per tablet Take 1 tablet by mouth 2 times daily   Yes Historical Provider, MD   venlafaxine (EFFEXOR XR) 75 MG extended release capsule TAKE ONE CAPSULE BY MOUTH DAILY 8/10/20  Yes Adrian De Los Santos MD   atenolol (TENORMIN) 25 MG tablet TAKE ONE TABLET BY MOUTH DAILY 21   Monika Aldana MD   oxyCODONE-acetaminophen (PERCOCET) 7.5-325 MG per tablet Take 1 tablet by mouth every 6 hours as needed for Pain. Historical Provider, MD   gabapentin (NEURONTIN) 300 MG capsule Take 300 mg by mouth 2 times daily as needed.      Historical Provider, MD   hydrOXYzine (ATARAX) 25 MG tablet TAKE ONE TABLET BY MOUTH THREE TIMES A DAY AS NEEDED FOR ANXIETY AND TAKE 2 TABLETS BY MOUTH AT BEDTIME 3/9/21   Adrian De Los Santos MD   furosemide (LASIX) 40 MG tablet TAKE ONE TABLET BY MOUTH DAILY  Patient taking differently: as needed  19   Adrian De Los Santos MD       Current medications:    Current Facility-Administered Medications   Medication Dose Route Frequency Provider Last Rate Last Admin    ciprofloxacin (CIPRO) IVPB 400 mg  400 mg Intravenous Once Mila Walker MD        sodium chloride flush 0.9 % injection 10 mL  10 mL Intravenous 2 times per day Laura Zulma, DO        sodium chloride flush 0.9 % injection 10 mL  10 mL Intravenous PRN Laura Sancheza, DO        0.9 % sodium chloride infusion  25 mL Intravenous PRN Laura Sancheza, DO        0.9 % sodium chloride infusion   Intravenous Continuous Nisha Lente, DO        lactated ringers infusion   Intravenous Continuous Nisha Lente, DO           Allergies:     Allergies   Allergen Reactions    Penicillins Swelling     Swelling in vaginal area only  Vaginal swelling       Problem List:    Patient Active Problem List   Diagnosis Code    Anxiety and depression F41.9, F32.9    Colon polyps K63.5    Tremor R25.1    Cigarette smoker F17.210    Hyperlipidemia E78.5    Essential hypertension I10    Thyroid nodule E04.1    Chronic bilateral low back pain without sciatica M54.5, G89.29    Abnormal CT of the abdomen R93.5    Renal mass, right N28.89    Chronic right flank pain R10.9, G89.29    Chronic, continuous use of opioids F11.90    Spondylolisthesis of lumbosacral region M43.17    Spinal stenosis of lumbar region with neurogenic claudication M48.062    Other osteoporosis without current pathological fracture M81.8    Other emphysema (HCC) J43.8    CVA (cerebral vascular accident) ([address] I63.9    Neck pain on left side M54.2    Sleep difficulties G47.9    Hair loss L65.9    Preop examination Z01.818       Past Medical History:        Diagnosis Date    Anxiety     Colon polyps     check q5yrs    CVA (cerebral vascular accident) ([address] 10/12/2018    Lacunar, left hemisphere    HTN (hypertension) 10/11    Hyperlipidemia     Kidney stones     Proteinuria 10/11    Screening mammogram 2010    benign       Past Surgical History:        Procedure Laterality Date    COLONOSCOPY  2010    Dr. Sue Mendiola - recheck 5 yrs    CYSTOSCOPY      ERCP  5/8/2015    sphincter and stent    MOUTH SURGERY      WRIST FRACTURE SURGERY Left 12/23/2016    OPEN REDUCTION INTERNAL FIXATION LEFT DISTAL RADIUS FRACTURE       Social History:    Social History     Tobacco Use    Smoking status: Current Every Day Smoker     Packs/day: 1.00     Years: 50.00     Pack years: 50.00     Types: Cigarettes     Start date: 5/1/1958    Smokeless tobacco: Never Used   Substance Use Topics    Alcohol use: No     Alcohol/week: 0.0 standard drinks                                Ready to quit: Not Answered  Counseling given: Not Answered      Vital Signs (Current):   Vitals:    04/15/21 0909 04/21/21 1109   BP:  (!) 113/53   Pulse:  58   Resp:  14   Temp:  98.9 °F (37.2 °C)   TempSrc:  Oral   SpO2:  98%   Weight: 130 lb (59 kg) 130 lb (59 kg)   Height: 5' 1\" (1.549 m) 5' 1\" (1.549 m)                                              BP Readings from Last 3 Encounters:   04/21/21 (!) 113/53   03/30/21 130/70   12/18/20 139/66       NPO Status:                                                                                 BMI:   Wt Readings from Last 3 Encounters:   04/21/21 130 lb (59 kg)   03/30/21 137 lb 6.4 oz (62.3 kg)   12/18/20 130 lb (59 kg)     Body mass index is 24.56 kg/m². CBC:   Lab Results   Component Value Date    WBC 5.3 12/18/2020    RBC 3.51 12/18/2020    HGB 10.3 12/18/2020    HCT 31.9 12/18/2020    MCV 90.8 12/18/2020    RDW 16.4 12/18/2020     12/18/2020       CMP:   Lab Results   Component Value Date     12/18/2020    K 4.7 12/18/2020     12/18/2020    CO2 25 12/18/2020    BUN 24 12/18/2020    CREATININE 1.4 12/18/2020    GFRAA 44 12/18/2020    GFRAA >60 03/27/2013    AGRATIO 1.5 11/25/2020    LABGLOM 36 12/18/2020    GLUCOSE 105 12/18/2020    GLUCOSE 91 10/07/2011    PROT 7.1 11/25/2020    PROT 7.4 10/02/2012    CALCIUM 9.3 12/18/2020    BILITOT <0.2 11/25/2020    ALKPHOS 75 11/25/2020    AST 13 11/25/2020    ALT 8 11/25/2020       POC Tests: No results for input(s): POCGLU, POCNA, POCK, POCCL, POCBUN, POCHEMO, POCHCT in the last 72 hours. Coags:   Lab Results   Component Value Date    PROTIME 10.9 12/18/2020    INR 0.94 12/18/2020       HCG (If Applicable): No results found for: PREGTESTUR, PREGSERUM, HCG, HCGQUANT     ABGs: No results found for: PHART, PO2ART, ZEW2OJR, OFB3TOG, BEART, G9YHKWGV     Type & Screen (If Applicable):   No results found for: Myrna Blue    Drug/Infectious Status (If Applicable):  No results found for: HIV, HEPCAB    COVID-19 Screening (If Applicable):   Lab Results   Component Value Date    COVID19 Not Detected 04/16/2021           Anesthesia Evaluation    Airway: Mallampati: III  TM distance: >3 FB   Neck ROM: full  Mouth opening: < 3 FB Dental:          Pulmonary:   (+) COPD:      (-) shortness of breath                           Cardiovascular:  Exercise tolerance: poor (<4 METS),   (+) hypertension:,     (-) past MI and  angina                Neuro/Psych:   (+) CVA (memory trouble): no interval change, psychiatric history:   (-) seizures           GI/Hepatic/Renal:   (+) renal disease: kidney stones,      (-) GERD       Endo/Other:        (-) diabetes mellitus               Abdominal:           Vascular:                                        Anesthesia Plan      general     ASA 3     (-npo MN  -denies chest pain or palp. Very poor ex gabriel, very unsteady, weak. )  Induction: intravenous. MIPS: Postoperative opioids intended. Anesthetic plan and risks discussed with patient. Plan discussed with CRNA.                   Lenny Warner MD   4/21/2021

## 2021-04-21 NOTE — CONSULTS
Nephrology Consult Note                                                                                                                                                                                                                                                                                                                                                               Office : [phone]     Fax :[phone]              Patient's Name: Cody Jessica  11:54 PM  4/21/2021    Reason for Consult: CHAPO   Requesting Physician:  Jessica Hwang MD      Chief Complaint:  Obstructive hydronephrosis     History of Present Ilness: This is a very pleasant 68 y.o. female with a history of chronic pain, narcotic dependence, hypertension, hyperlipidemia, CVA and kidney stones who was admitted for history of obstructive hydronephrosis with previous stent placement, by the urology service. We were asked to see her with a view to assist in medical management.     She appears to have had hydronephrosis for several months with imaging in December 2020 showing: Marked hydronephrosis on the right of uncertain etiology. No obstructing calculus is identified. Findings may relate to nonvisualized stone, stricture or neoplasm.     Of note also, was dilatation of the common bile duct, indeterminate.     She had CYSTOSCOPY, RIGHT URETEROSCOPY, WITH RETROGRADE, RIGHT URETERAL BIOPSY,  RIGHT STENT EXCHANGE 4/21/2021.      Past Medical History:   Diagnosis Date    Anxiety     Colon polyps     check q5yrs    [address]cere[facility] [address]) [address] 10/12/2018    Lacunar, left hemisphere    HTN (hypertension) 10/11    Hyperlipidemia     Kidney stones     Proteinuria 10/11    Screening mammogram 2010    benign       Past Surgical History:   Procedure Laterality Date    COLONOSCOPY  2010    Dr. Christelle Toro - recheck 5 yrs    CYSTOSCOPY      ERCP  5/8/2015    sphincter and stent    EYE SURGERY Bilateral     cataract removal    MOUTH SURGERY      WRIST FRACTURE SURGERY Left 12/23/2016    OPEN REDUCTION INTERNAL FIXATION LEFT DISTAL RADIUS FRACTURE       Family History   Problem Relation Age of Onset    Diabetes Mother     Heart Disease Father     Cancer Brother     Kidney Disease Brother         reports that she has been smoking cigarettes. She started smoking about 63 years ago. She has a 50.00 pack-year smoking history. She has never used smokeless tobacco. She reports that she does not drink alcohol or use drugs.     Allergies:  Penicillins    Current Medications:    [START ON 4/22/2021] atenolol (TENORMIN) tablet 25 mg, Daily  hydrOXYzine (ATARAX) tablet 25 mg, BID PRN  oxyCODONE-acetaminophen (PERCOCET) 7.5-325 MG per tablet 1 tablet, Q6H PRN  [START ON 4/22/2021] venlafaxine (EFFEXOR XR) extended release capsule 75 mg, Daily with breakfast  sodium chloride flush 0.9 % injection 5-40 mL, 2 times per day  sodium chloride flush 0.9 % injection 5-40 mL, PRN  0.9 % sodium chloride infusion, PRN  promethazine (PHENERGAN) tablet 12.5 mg, Q6H PRN    Or  ondansetron (ZOFRAN) injection 4 mg, Q6H PRN  0.9 % sodium chloride infusion, Continuous  bisacodyl (DULCOLAX) EC tablet 5 mg, Daily  sennosides-docusate sodium (SENOKOT-S) 8.6-50 MG tablet 2 tablet, BID        Review of Systems:   14 point ROS obtained but were negative except mentioned in HPI      Physical exam:     Vitals:  BP (!) 115/52   Pulse 60   Temp 97.6 °F (36.4 °C) (Oral)   Resp 16   Ht 5' 1\" (1.549 m)   Wt 130 lb (59 kg)   SpO2 93%   BMI 24.56 kg/m²   Constitutional:  OAA X3 NAD  Skin: no rash, turgor wnl  Heent:  eomi, mmm  Neck: no bruits or jvd noted  Cardiovascular:  S1, S2 without m/r/g  Respiratory: CTA B without w/r/r  Abdomen:  +bs, soft, nt, nd  Ext: + lower extremity edema  Psychiatric: mood and affect appropriate  Musculoskeletal:  Rom, muscular strength intact    Data:   Labs:  CBC:   Recent Labs     04/21/21  1306   WBC 6.0   HGB 11.6*        BMP: Recent Labs     04/21/21  1306 04/21/21  1509   * 137   K 6.1* 5.4*    108   CO2 21 21   BUN 45* 43*   CREATININE 2.7* 2.7*   GLUCOSE 92 108*     Ca/Mg/Phos:   Recent Labs     04/21/21  1306 04/21/21  1509   CALCIUM 9.4 8.7     Hepatic: No results for input(s): AST, ALT, ALB, BILITOT, ALKPHOS in the last 72 hours. Troponin: No results for input(s): TROPONINI in the last 72 hours. BNP: No results for input(s): BNP in the last 72 hours. Lipids: No results for input(s): CHOL, TRIG, HDL, LDLCALC, LABVLDL in the last 72 hours. ABGs: No results for input(s): PHART, PO2ART, HMZ9WUC in the last 72 hours. INR: No results for input(s): INR in the last 72 hours. UA:No results for input(s): Radha [place], GLUCOSEU, BILIRUBINUR, Sarah Qi, BLOODU, PHUR, PROTEINU, UROBILINOGEN, NITRU, LEUKOCYTESUR, LABMICR, URINETYPE in the last 72 hours. Urine Microscopic: No results for input(s): LABCAST, BACTERIA, COMU, HYALCAST, WBCUA, RBCUA, EPIU in the last 72 hours. Urine Culture: No results for input(s): LABURIN in the last 72 hours. Urine Chemistry: No results for input(s): Vera Filter, PROTEINUR, NAUR in the last 72 hours. IMAGING:  FL RETROGRADE PYELOGRAM W WO KUB   Final Result   See above          Assessment/Plan   1. CHAPO on CKD 3     2. HTN    3. Anemia    4. Acid- base/ Electrolyte imbalance     5.  Obstructive nephropathy     Plan   - K elevated   - Low K diet   - IVF   - Ur studies  - Monitor renal function   - labs in am       Recommend to dose adjust all medications  based on renal functions  Maintain SBP> 90 mmHg   Daily weights   AVOID NSAIDs  Avoid Nephrotoxins  Monitor Intake/Output  Call if significant decrease in urine output                 Thank you for allowing us to participate in care [facility] free to contact me   [facility] [address]  Office : [phone]  Fax :[phone]

## 2021-04-21 NOTE — H&P
[place]    [id number]    [facility] [place] Update H & P  Department of General Surgery   Surgical Service   Pre-operative History and Physical  Last H & P within the last 30 days. DIAGNOSIS:   Hydronephrosis, unspecified hydronephrosis type [N13.30]    Procedure(s):  CYSTOSCOPY, RIGHT URETEROSCOPY, HOLMIUM LASER, STONE MANIPULATION, POSSIBLE RIGHT STENT EXCHANGE     HISTORY OF PRESENT ILLNESS:   Patient with hydronephrosis due to right kidney stone presents today for the above procedure. Covid 19:  Patient denies fever, chills, cough or known exposure to Covid-19. Past Medical History:        Diagnosis Date    Anxiety     Colon polyps     check q5yrs    [address]A (cerebral vascular [address]) ([facility] [address] 10/12/2018    Lacunar, left hemisphere    HTN (hypertension) 10/11    Hyperlipidemia     Kidney stones     Proteinuria 10/11    Screening mammogram 2010    benign     Past Surgical History:        Procedure Laterality Date    COLONOSCOPY  2010    Dr. Nuha Reyes - recheck 5 yrs    CYSTOSCOPY      ERCP  5/8/2015    sphincter and stent    MOUTH SURGERY      WRIST FRACTURE SURGERY Left 12/23/2016    OPEN REDUCTION INTERNAL FIXATION LEFT DISTAL RADIUS FRACTURE     Past Social History:  Social History     Socioeconomic History    Marital status:       Spouse name: None    Number of children: None    Years of education: None    Highest education level: None   Occupational History    None   Social Needs    Financial resource strain: Not hard at all   [place] insecurity     Worry: Never true     Inability: Never true    Transportation needs     Medical: No     Non-medical: No   Tobacco Use    Smoking status: Current Every Day Smoker     Packs/day: 1.00     Years: 50.00     Pack years: 50.00     Types: Cigarettes     Start date: 5/1/1958    Smokeless tobacco: Never Used   Substance and Sexual Activity    Alcohol use: No     Alcohol/week: 0.0 standard drinks    Drug use: No    Sexual activity: Never   Lifestyle    Physical activity     Days per week: None     Minutes per session: None    Stress: None   Relationships    Social connections     Talks on phone: None     Gets together: None     Attends [employer] service: None     Active member of club or organization: None     Attends meetings of clubs or organizations: None     Relationship status: None    Intimate partner violence     Fear of current or ex partner: None     Emotionally abused: None     Physically abused: None     Forced sexual activity: None   Other Topics Concern    None   Social History Narrative    None         Medications Prior to Admission:      Prior to Admission medications    Medication Sig Start Date End Date Taking? Authorizing Provider   sulfamethoxazole-trimethoprim (BACTRIM DS;SEPTRA DS) 800-160 MG per tablet Take 1 tablet by mouth 2 times daily   Yes Historical Provider, MD   venlafaxine (EFFEXOR XR) 75 MG extended release capsule TAKE ONE CAPSULE BY MOUTH DAILY 8/10/20  Yes Jessica Hwang MD   atenolol (TENORMIN) 25 MG tablet TAKE ONE TABLET BY MOUTH DAILY 4/19/21   Mayur Hinson MD   oxyCODONE-acetaminophen (PERCOCET) 7.5-325 MG per tablet Take 1 tablet by mouth every 6 hours as needed for Pain. Historical Provider, MD   gabapentin (NEURONTIN) 300 MG capsule Take 300 mg by mouth 2 times daily as needed.      Historical Provider, MD   hydrOXYzine (ATARAX) 25 MG tablet TAKE ONE TABLET BY MOUTH THREE TIMES A DAY AS NEEDED FOR ANXIETY AND TAKE 2 TABLETS BY MOUTH AT BEDTIME 3/9/21   Jessica Hwang MD   furosemide (LASIX) 40 MG tablet TAKE ONE TABLET BY MOUTH DAILY  Patient taking differently: as needed  6/18/19   Jessica Hwang MD         Allergies:  Penicillins    PHYSICAL EXAM:      BP (!) 113/53   Pulse 58   Temp 98.9 °F (37.2 °C) (Oral)   Resp 14   Ht 5' 1\" (1.549 m)   Wt 130 lb (59 kg)   SpO2 98%   BMI 24.56 kg/m²      Airway:  Airway patent with no audible stridor    Heart:  regular rate and rhythm, No murmur noted    Lungs:  No increased work of breathing, good air exchange, clear to auscultation bilaterally, no crackles or wheezing    Abdomen:  soft, non-distended, non-tender, no rebound tenderness or guarding, normal active bowel sounds and no masses palpated    ASSESSMENT AND PLAN     Patient is a 68 y.o. female with above specified procedure planned. 1.  The patients history and physical was obtained and signed off by the pre-admission testing department. Patient seen and focused exam done today- no new changes since last physical exam on 3/30/21     2. Access to ancillary services are available per request of the provider.     Dawn Scales, APRN - CNP     4/21/2021

## 2021-04-22 LAB
ALBUMIN SERPL-MCNC: 3.4 G/DL (ref 3.4–5)
ALBUMIN SERPL-MCNC: 3.4 G/DL (ref 3.4–5)
ALP BLD-CCNC: 70 U/L (ref 40–129)
ALT SERPL-CCNC: 7 U/L (ref 10–40)
ANION GAP SERPL CALCULATED.3IONS-SCNC: 11 MMOL/L (ref 3–16)
AST SERPL-CCNC: 12 U/L (ref 15–37)
BILIRUB SERPL-MCNC: <0.2 MG/DL (ref 0–1)
BILIRUBIN DIRECT: <0.2 MG/DL (ref 0–0.3)
BILIRUBIN, INDIRECT: ABNORMAL MG/DL (ref 0–1)
BUN BLDV-MCNC: 36 MG/DL (ref 7–20)
CALCIUM SERPL-MCNC: 8 MG/DL (ref 8.3–10.6)
CHLORIDE BLD-SCNC: 109 MMOL/L (ref 99–110)
CO2: 17 MMOL/L (ref 21–32)
CREAT SERPL-MCNC: 2.1 MG/DL (ref 0.6–1.2)
CREATININE URINE: 55.6 MG/DL (ref 28–259)
GFR AFRICAN AMERICAN: 27
GFR NON-AFRICAN AMERICAN: 23
GLUCOSE BLD-MCNC: 99 MG/DL (ref 70–99)
HCT VFR BLD CALC: 31.2 % (ref 36–48)
HEMOGLOBIN: 10.3 G/DL (ref 12–16)
MCH RBC QN AUTO: 29.9 PG (ref 26–34)
MCHC RBC AUTO-ENTMCNC: 33.1 G/DL (ref 31–36)
MCV RBC AUTO: 90.3 FL (ref 80–100)
PDW BLD-RTO: 15.6 % (ref 12.4–15.4)
PHOSPHORUS: 4.4 MG/DL (ref 2.5–4.9)
PLATELET # BLD: 215 K/UL (ref 135–450)
PMV BLD AUTO: 8.1 FL (ref 5–10.5)
POTASSIUM SERPL-SCNC: 5.3 MMOL/L (ref 3.5–5.1)
PROTEIN PROTEIN: 75.7 MG/DL
RBC # BLD: 3.45 M/UL (ref 4–5.2)
SODIUM BLD-SCNC: 137 MMOL/L (ref 136–145)
TOTAL PROTEIN: 6.2 G/DL (ref 6.4–8.2)
WBC # BLD: 5.3 K/UL (ref 4–11)

## 2021-04-22 PROCEDURE — 6370000000 HC RX 637 (ALT 250 FOR IP): Performed by: STUDENT IN AN ORGANIZED HEALTH CARE EDUCATION/TRAINING PROGRAM

## 2021-04-22 PROCEDURE — 80069 RENAL FUNCTION PANEL: CPT

## 2021-04-22 PROCEDURE — 97116 GAIT TRAINING THERAPY: CPT

## 2021-04-22 PROCEDURE — 82570 ASSAY OF URINE CREATININE: CPT

## 2021-04-22 PROCEDURE — 6370000000 HC RX 637 (ALT 250 FOR IP): Performed by: INTERNAL MEDICINE

## 2021-04-22 PROCEDURE — 2580000003 HC RX 258: Performed by: STUDENT IN AN ORGANIZED HEALTH CARE EDUCATION/TRAINING PROGRAM

## 2021-04-22 PROCEDURE — 6370000000 HC RX 637 (ALT 250 FOR IP): Performed by: PHYSICIAN ASSISTANT

## 2021-04-22 PROCEDURE — 1200000000 HC SEMI PRIVATE

## 2021-04-22 PROCEDURE — 97161 PT EVAL LOW COMPLEX 20 MIN: CPT

## 2021-04-22 PROCEDURE — 2580000003 HC RX 258: Performed by: INTERNAL MEDICINE

## 2021-04-22 PROCEDURE — 51798 US URINE CAPACITY MEASURE: CPT

## 2021-04-22 PROCEDURE — 99233 SBSQ HOSP IP/OBS HIGH 50: CPT | Performed by: INTERNAL MEDICINE

## 2021-04-22 PROCEDURE — 84156 ASSAY OF PROTEIN URINE: CPT

## 2021-04-22 PROCEDURE — 80076 HEPATIC FUNCTION PANEL: CPT

## 2021-04-22 PROCEDURE — 36415 COLL VENOUS BLD VENIPUNCTURE: CPT

## 2021-04-22 PROCEDURE — 85027 COMPLETE CBC AUTOMATED: CPT

## 2021-04-22 RX ORDER — NICOTINE 21 MG/24HR
1 PATCH, TRANSDERMAL 24 HOURS TRANSDERMAL DAILY
Status: DISCONTINUED | OUTPATIENT
Start: 2021-04-22 | End: 2021-04-23 | Stop reason: HOSPADM

## 2021-04-22 RX ORDER — OXYCODONE AND ACETAMINOPHEN 7.5; 325 MG/1; MG/1
1 TABLET ORAL ONCE
Status: COMPLETED | OUTPATIENT
Start: 2021-04-22 | End: 2021-04-22

## 2021-04-22 RX ORDER — TAMSULOSIN HYDROCHLORIDE 0.4 MG/1
0.4 CAPSULE ORAL DAILY
Status: DISCONTINUED | OUTPATIENT
Start: 2021-04-22 | End: 2021-04-23 | Stop reason: HOSPADM

## 2021-04-22 RX ADMIN — OXYCODONE HYDROCHLORIDE AND ACETAMINOPHEN 1 TABLET: 7.5; 325 TABLET ORAL at 13:30

## 2021-04-22 RX ADMIN — OXYCODONE HYDROCHLORIDE AND ACETAMINOPHEN 1 TABLET: 7.5; 325 TABLET ORAL at 00:43

## 2021-04-22 RX ADMIN — DOCUSATE SODIUM 50 MG AND SENNOSIDES 8.6 MG 2 TABLET: 8.6; 5 TABLET, FILM COATED ORAL at 09:37

## 2021-04-22 RX ADMIN — VENLAFAXINE HYDROCHLORIDE 75 MG: 75 CAPSULE, EXTENDED RELEASE ORAL at 09:37

## 2021-04-22 RX ADMIN — SODIUM CHLORIDE: 9 INJECTION, SOLUTION INTRAVENOUS at 17:24

## 2021-04-22 RX ADMIN — ATENOLOL 25 MG: 25 TABLET ORAL at 11:06

## 2021-04-22 RX ADMIN — OXYCODONE HYDROCHLORIDE AND ACETAMINOPHEN 1 TABLET: 7.5; 325 TABLET ORAL at 20:50

## 2021-04-22 RX ADMIN — OXYCODONE HYDROCHLORIDE AND ACETAMINOPHEN 1 TABLET: 7.5; 325 TABLET ORAL at 07:30

## 2021-04-22 RX ADMIN — TAMSULOSIN HYDROCHLORIDE 0.4 MG: 0.4 CAPSULE ORAL at 11:06

## 2021-04-22 RX ADMIN — BISACODYL 5 MG: 5 TABLET, COATED ORAL at 09:37

## 2021-04-22 RX ADMIN — OXYCODONE HYDROCHLORIDE AND ACETAMINOPHEN 1 TABLET: 7.5; 325 TABLET ORAL at 23:05

## 2021-04-22 RX ADMIN — SODIUM CHLORIDE: 9 INJECTION, SOLUTION INTRAVENOUS at 07:31

## 2021-04-22 RX ADMIN — Medication 10 ML: at 19:58

## 2021-04-22 ASSESSMENT — PAIN SCALES - GENERAL
PAINLEVEL_OUTOF10: 7
PAINLEVEL_OUTOF10: 0
PAINLEVEL_OUTOF10: 0
PAINLEVEL_OUTOF10: 7
PAINLEVEL_OUTOF10: 3
PAINLEVEL_OUTOF10: 0
PAINLEVEL_OUTOF10: 7
PAINLEVEL_OUTOF10: 0

## 2021-04-22 ASSESSMENT — PAIN - FUNCTIONAL ASSESSMENT: PAIN_FUNCTIONAL_ASSESSMENT: PREVENTS OR INTERFERES SOME ACTIVE ACTIVITIES AND ADLS

## 2021-04-22 ASSESSMENT — PAIN DESCRIPTION - DESCRIPTORS
DESCRIPTORS: ACHING
DESCRIPTORS: HEADACHE;DISCOMFORT

## 2021-04-22 ASSESSMENT — PAIN DESCRIPTION - LOCATION
LOCATION: BACK
LOCATION: BACK

## 2021-04-22 ASSESSMENT — PAIN DESCRIPTION - FREQUENCY
FREQUENCY: INTERMITTENT
FREQUENCY: INTERMITTENT

## 2021-04-22 ASSESSMENT — PAIN DESCRIPTION - PAIN TYPE
TYPE: ACUTE PAIN
TYPE: CHRONIC PAIN
TYPE: ACUTE PAIN

## 2021-04-22 ASSESSMENT — PAIN DESCRIPTION - ORIENTATION: ORIENTATION: RIGHT

## 2021-04-22 ASSESSMENT — PAIN DESCRIPTION - ONSET: ONSET: ON-GOING

## 2021-04-22 NOTE — OP NOTE
Operative Note      Patient: Fidel Woody  YOB: 1940  MRN: [id number]    Date of Procedure: 4/21/2021    Pre-Op Diagnosis: Hydronephrosis, unspecified hydronephrosis type [N13.30], right ureteral tumor    Post-Op Diagnosis: Same       Procedure(s):  CYSTOSCOPY, RIGHT URETEROSCOPY, WITH RETROGRADE, RIGHT URETERAL BIOPSY,  RIGHT STENT EXCHANGE    Surgeon(s):  Isela Raza MD    Assistant:   * No surgical staff found *    Anesthesia: General    Estimated Blood Loss (mL): Minimal    Complications: None    Specimens:   ID Type Source Tests Collected by Time Destination   A : A. RIGHT URETERAL TUMOR Tissue Tissue SURGICAL PATHOLOGY Isela Raza MD 4/21/2021 1350        Implants:  Implant Name Type Inv. Item Serial No.  Lot No. LRB No. Used Action   STENT URET 6FR L24CM PERCFLX HYDR+ DBL PGTL THRD 2  STENT URET 6FR L24CM PERCFLX HYDR+ DBL PGTL THRD 2  [facility]OLOGY- [id number] Right 1 Implanted         Drains: * No LDAs found *    Indications:  68 y.o. female who originally presented with right hydroureteronephrosis. She was evaluated by Dr. Pamela Fu and found to have a large right ureteral tumor. 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 She desired an additional attempt at endoscopic resection. She agreed to undergo the above named procedure after discussion of the alternatives, risks and benefits. Informed consent was obtained. Findings:  Cystoscopy revealed no tumors, stones or other mucosal abnormalities. Ureteroscopy revealed the large right mid ureteral tumor to still be present. This tumor was at least 2-3 cm in size and nearly completely blocking the lumen of the right ureter. This is endoscopically unresectable. Retrograde pyelogram revealed only a slightly dilated system with no obvious filling defects in the renal pelvis or calyces. Procedure: The patient was taken to the operating room and placed supine on the operating table.   Pre-operative

## 2021-04-22 NOTE — PROGRESS NOTES
Hospitalist Progress Note      PCP: Luna Coy MD    Chief Complaint. Patient is a 70-year-old female with past medical history of chronic pain, narcotic dependence, hypertension hyperlipidemia, CVA with kidney stones who presented to hospital for obstructive hydronephrosis. Date of Admission: 4/21/2021    Subjective:   denies chest pain, nausea, vomiting, shortness of breath, fever or chills. mention feels overall better    Medications:  Reviewed    Infusion Medications    sodium chloride      sodium chloride 100 mL/hr at 04/22/21 1107     Scheduled Medications    nicotine  1 patch Transdermal Daily    tamsulosin  0.4 mg Oral Daily    atenolol  25 mg Oral Daily    venlafaxine  75 mg Oral Daily with breakfast    sodium chloride flush  5-40 mL Intravenous 2 times per day    bisacodyl  5 mg Oral Daily    sennosides-docusate sodium  2 tablet Oral BID     PRN Meds: hydrOXYzine, oxyCODONE-acetaminophen, sodium chloride flush, sodium chloride, promethazine **OR** ondansetron      Intake/Output Summary (Last 24 hours) at 4/22/2021 1231  Last data filed at 4/22/2021 0936  Gross per 24 hour   Intake 2579 ml   Output 2009 ml   Net 570 ml       Physical Exam Performed:    /63   Pulse 60   Temp 98.4 °F (36.9 °C) (Oral)   Resp 16   Ht 5' 1\" (1.549 m)   Wt 130 lb (59 kg)   SpO2 95%   BMI 24.56 kg/m²     General appearance: No apparent distress,   HEENT:  Conjunctivae/corneas clear. Neck: Supple, with full range of motion. Respiratory:  Normal respiratory effort. Clear to auscultation, bilaterally without Rales/Wheezes/Rhonchi. Cardiovascular: Regular rate and rhythm with normal S1/S2 without murmurs or rubs  Abdomen: Soft, non-tender, non-distended, normal bowel sounds. Musculoskeletal: No cyanosis or edema bilaterally  Neurologic:  without any focal sensory/motor deficits.  grossly non-focal.  Psychiatric: Alert and oriented, Normal mood  Peripheral Pulses: +2 palpable, equal bilaterally Labs:   Recent Labs     04/21/21  1306 04/22/21  0544   WBC 6.0 5.3   HGB 11.6* 10.3*   HCT 35.2* 31.2*    215     Recent Labs     04/21/21  1306 04/21/21  1509 04/22/21  0544   * 137 137   K 6.1* 5.4* 5.3*    108 109   CO2 21 21 17*   BUN 45* 43* 36*   CREATININE 2.7* 2.7* 2.1*   CALCIUM 9.4 8.7 8.0*   PHOS  --   --  4.4     Recent Labs     04/22/21  0544   AST 12*   ALT 7*   BILIDIR <0.2   BILITOT <0.2   ALKPHOS 70     No results for input(s): INR in the last 72 hours. No results for input(s): Joana Ends in the last 72 hours. Urinalysis:      Lab Results   Component Value Date    NITRU Negative 12/18/2020    WBCUA  11/09/2016    BACTERIA 4+ 11/09/2016    RBCUA 3-5 11/09/2016    BLOODU Negative 12/18/2020    SPECGRAV 1.010 12/18/2020    GLUCOSEU Negative 12/18/2020    GLUCOSEU Negative 10/07/2011       Radiology:  FL RETROGRADE PYELOGRAM W WO KUB   Final Result   See above            Assessment/Plan:    Active Hospital Problems    Diagnosis    CHAPO (acute kidney injury) ([facility] [address] [N17.9]    Hydronephrosis [N13.30]    Electrolyte imbalance [E87.8]    Hyperkalemia [E87.5]       Patient is a 53-year-old female with past medical history of chronic pain, narcotic dependence, hypertension hyperlipidemia, CVA with kidney stones who presented to hospital for obstructive hydronephrosis.     Assessment  Hydronephrosis  CHAPO on CKD stage III  CBD dilation  Chronic pain, narcotic dependence  Hypertension      Plan  Urology was consulted, status post right ureteroscopy with retrograde right ureteral stent exchange  Nephrology has been consulted, continue IV fluid therapy per  nephrology, monitor BMP  Monitor LFTs  DVT prophylaxis-SCDs only due to hematuria and urine bag  Disposition-per primary team  Diet: DIET GENERAL; Low Potassium  Code Status: Full Code    PT/OT Eval Status: ordered    Dispo -  per primary team      Preethi Simon MD

## 2021-04-22 NOTE — PROGRESS NOTES
URETEROSCOPY, WITH RETROGRADE, RIGHT URETERAL BIOPSY,  RIGHT STENT EXCHANGE  Decision Making: Low Complexity  PT Education: Goals;PT Role;General Safety; Functional Mobility Training  Patient Education: Pt verbalized understanding  REQUIRES PT FOLLOW UP: Yes       Patient Diagnosis(es): The encounter diagnosis was Hydronephrosis, unspecified hydronephrosis type. has a past medical history of Anxiety, Colon polyps, CVA (cerebral vascular accident) ([address] HTN (hypertension), Hyperlipidemia, Kidney stones, Proteinuria, and Screening mammogram.   has a past surgical history that includes Colonoscopy (2010); ERCP (5/8/2015); Wrist fracture surgery (Left, 12/23/2016); Cystoscopy; Mouth surgery; eye surgery (Bilateral); and Cystoscopy (Right, 4/21/2021). Restrictions  Position Activity Restriction  Other position/activity restrictions: ambulate  Vision/Hearing  Vision: Within Functional Limits(reading glasses)  Hearing: Within functional limits     Subjective  General  Chart Reviewed: Yes  Additional Pertinent Hx: Pt is a 68 y.o. female adm 4/21/ with hydronephrosis. Pt s/p CYSTOSCOPY, RIGHT URETEROSCOPY, WITH RETROGRADE, RIGHT URETERAL BIOPSY,  RIGHT STENT EXCHANGE on 4/21. PMH: CVA, HTN, hyperlipidemia, anxiety, L wrist ORIF  Referring Practitioner: Joanna Perez MD  Referral Date : 04/22/21  Subjective  Subjective: Pt found supine. Agreeable to PT. \"That happens to me sometimes where i can't think of something. It's been that way since the stroke. \"  Pain Screening  Patient Currently in Pain: Yes(chronic back pain in back and down L leg, not rated, RN aware)  Vital Signs  Patient Currently in Pain: Yes(chronic back pain in back and down L leg, not rated, RN aware)       Orientation  Orientation  Overall Orientation Status: Within Functional Limits  Social/Functional History  Social/Functional History  Lives With: Friend(s)  Type of Home: House  Home Layout: Able to Live on Main level with bedroom/bathroom(laundry on first floor)  Home Access: (1 JIMMY)  Bathroom Shower/Tub: Tub/Shower unit  Bathroom Toilet: Standard(toilet safety frame around toilet)  Bathroom Equipment: Tub transfer bench, Grab bars in [address] [place]: 4 wheeled walker, Cane  ADL Assistance: [address]: (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 \"Sometimes when I turn, I lose my balance and I just go. \"  3 falls in last few months. Had a stroke a few years ago - notes trouble with memory since then but denies any weakness. Friend she lives with is 80years old. Pt reports \"she does better than I do sometimes. We help each other. \"  Reports her friend will be with her at d/c.   Cognition        Objective          AROM RLE (degrees)  RLE AROM: WFL  AROM LLE (degrees)  LLE AROM : WFL  Strength RLE  Strength RLE: WFL  Strength LLE  Strength LLE: WFL        Bed mobility  Supine to Sit: Stand by assistance(HOB elevated)  Transfers  Sit to Stand: Contact guard assistance  Stand to sit: Contact guard assistance  Ambulation  Ambulation?: Yes  Ambulation 1  Device: Single point cane  Assistance: Contact guard assistance  Quality of Gait: slightly unsteady but no overt LOB, decreased fidelia  Distance: 150'          Treatment included: bed mobility, transfers, gt, pt education      Plan   Plan  Times per week: 2-5  Current Treatment Recommendations: Functional Mobility Training, Gait Training, Endurance Training, Safety Education & Training, Patient/Caregiver Education & Training, Balance Training  Safety Devices  Type of devices: Call light within reach, Chair alarm in place, Nurse notified, Left in chair    G-Code       OutComes Score AM-PAC Score  AM-PAC Inpatient Mobility Raw Score : 18 (04/22/21 1439)  AM-PAC Inpatient T-Scale Score : 43.63 (04/22/21 1439)  Mobility Inpatient CMS 0-100% Score: 46.58 (04/22/21 1439)  Mobility Inpatient CMS G-Code Modifier : CK (04/22/21 1439)          Goals  Short term goals  Time Frame for Short term goals: By discharge  Short term goal 1: Sup to sit supervision with bed flat  Short term goal 2: Sit to stand supervision  Short term goal 3: Pt will amb >200' with LRAD supervision  Short term goal 4: Pt will up/down 1 step with LRAD supervision       Therapy Time   Individual Concurrent Group Co-treatment   Time In 1406         Time Out 1437         Minutes 31               Timed Code Treatment Minutes: 16      Total Treatment Minutes:  [address], PT

## 2021-04-22 NOTE — PLAN OF CARE
Problem: Falls - Risk of:  Goal: Will remain free from falls  Description: Will remain free from falls  Outcome: Ongoing  Note: Pt at risk for falls r/t weakness  Pt placed in fall risk precautions, bed alarm In place, bed in lowest locked position, call light in reach. RN encouraged pt to stay in bed and use call light to express needs.

## 2021-04-22 NOTE — PROGRESS NOTES
Office : [phone]     Fax :[phone]         Renal Progress Note  Subjective:   Admit Date: 4/21/2021     HPI   This is a very pleasant 79 y. o. female with a history of chronic pain, narcotic dependence, hypertension, hyperlipidemia, CVA and kidney stones who was admitted for history of obstructive hydronephrosis with previous stent placement, by the urology service.  We were asked to see her with a view to assist in medical management.     She appears to have had hydronephrosis for several months with imaging in December 2020 showing: Marked hydronephrosis on the right of uncertain etiology. No obstructing calculus is identified. Findings may relate to nonvisualized stone, stricture or neoplasm.     Of note also, was dilatation of the common bile duct, indeterminate.     She had CYSTOSCOPY, RIGHT URETEROSCOPY, WITH RETROGRADE, RIGHT URETERAL BIOPSY,  RIGHT STENT EXCHANGE 4/21/2021.       Interval History:   Patient seen at bedside this morning, no acute distress; no acute overnight events. Patient did have some urinary retention that required straight cath. This morning she had the urge to urinate but only expelled a few drops of urine. Bladder scan showed 609 mL; null was placed with return of 600 mL of tea colored urine.    BP in good range  UOP 800cc yesterday       DIET DIET GENERAL; Low Potassium  Medications:   Scheduled Meds:   atenolol  25 mg Oral Daily    venlafaxine  75 mg Oral Daily with breakfast    sodium chloride flush  5-40 mL Intravenous 2 times per day    bisacodyl  5 mg Oral Daily    sennosides-docusate sodium  2 tablet Oral BID Continuous Infusions:   sodium chloride      sodium chloride 75 mL/hr at 04/22/21 0731       Labs:  CBC:   Recent Labs     04/21/21  1306   WBC 6.0   HGB 11.6*        BMP:    Recent Labs     04/21/21  1306 04/21/21  1509   * 137   K 6.1* 5.4*    108   CO2 21 21   BUN 45* 43*   CREATININE 2.7* 2.7*   GLUCOSE 92 108*     Ca/Mg/Phos:   Recent Labs     04/21/21  1306 04/21/21  1509   CALCIUM 9.4 8.7     Hepatic:   Recent Labs     04/22/21  0544   AST 12*   ALT 7*   BILITOT <0.2   ALKPHOS 70     Troponin: No results for input(s): TROPONINI in the last 72 hours. BNP: No results for input(s): BNP in the last 72 hours. Lipids: No results for input(s): CHOL, TRIG, HDL, LDLCALC, LABVLDL in the last 72 hours. ABGs: No results for input(s): PHART, PO2ART, HSH4LOX in the last 72 hours. INR: No results for input(s): INR in the last 72 hours. UA:No results for input(s): Devorah Pickler, GLUCOSEU, BILIRUBINUR, [place], BLOODU, PHUR, PROTEINU, UROBILINOGEN, NITRU, LEUKOCYTESUR, LABMICR, URINETYPE in the last 72 hours. Urine Microscopic: No results for input(s): LABCAST, BACTERIA, COMU, HYALCAST, WBCUA, RBCUA, EPIU in the last 72 hours. Urine Culture: No results for input(s): LABURIN in the last 72 hours. Urine Chemistry: No results for input(s): Audrea Stanton, PROTEINUR, NAUR in the last 72 hours.     Objective:   Vitals: BP (!) 119/52   Pulse 58   Temp 98.4 °F (36.9 °C) (Oral)   Resp 16   Ht 5' 1\" (1.549 m)   Wt 130 lb (59 kg)   SpO2 93%   BMI 24.56 kg/m²    Wt Readings from Last 3 Encounters:   04/21/21 130 lb (59 kg)   03/30/21 137 lb 6.4 oz (62.3 kg)   12/18/20 130 lb (59 kg)      24HR INTAKE/OUTPUT:      Intake/Output Summary (Last 24 hours) at 4/22/2021 0948  Last data filed at 4/22/2021 3562  Gross per 24 hour   Intake 2459 ml   Output 2009 ml   Net 450 ml     Constitutional:  Alert, awake, no apparent distress  NECK: supple, no JVD  Cardiovascular:  S1, S2 without m/r/g

## 2021-04-22 NOTE — PROGRESS NOTES
Pt has yet to void since prior to surgery. RN ambulated pt to restroom and she is unable to void on her own. Bladder scan reveals >750. Urology paged.

## 2021-04-22 NOTE — PROGRESS NOTES
Urology Attending Progress Note      Subjective: Urgency to urinate with inability to empty bladder reported     Vitals:  BP (!) 119/52   Pulse 58   Temp 98.4 °F (36.9 °C) (Oral)   Resp 16   Ht 5' 1\" (1.549 m)   Wt 130 lb (59 kg)   SpO2 93%   BMI 24.56 kg/m²   Temp  Av.9 °F (35.5 °C)  Min: 94.8 °F (34.9 °C)  Max: 98.9 °F (37.2 °C)    Intake/Output Summary (Last 24 hours) at 2021 1035  Last data filed at 2021 0936  Gross per 24 hour   Intake 2579 ml   Output 2009 ml   Net 570 ml       Exam: No suprapubic distention noted. Labs:  WBC:    Lab Results   Component Value Date    WBC 6.0 2021     Hemoglobin/Hematocrit:    Lab Results   Component Value Date    HGB 11.6 2021    HCT 35.2 2021     BMP:    Lab Results   Component Value Date     2021    K 5.4 2021     2021    CO2 21 2021    BUN 43 2021    LABALBU 3.4 2021    CREATININE 2.7 2021    CALCIUM 8.7 2021    GFRAA 21 2021    GFRAA >60 2013    LABGLOM 17 2021     PT/INR:    Lab Results   Component Value Date    PROTIME 10.9 2020    INR 0.94 2020     PTT:  No results found for: APTT[APTT      Imaging: No new     Impression/Plan: 69 yo F POD#1 sp cystoscopy, R urs, R ureteral biopsy and stent exchange.  -Patient having difficulty urinating since procedure. Required straight cath yesterday. -PVR this AM ~600cc. Andrade was inserted for return of tea colored urine.  -Start flomax  -Awaiting urine studies from nephrology. Appreciate recs. Anticipate discharge in 1-2 days.      ALANNA Pretty

## 2021-04-22 NOTE — PROGRESS NOTES
Pt resting with no complaints at this time. Pt ambulated to restroom with RN with unsteady gait. Pt unable to urinate. Will bladder scan and re-assess. RR easy and unlabored. VSS. Bed locked and in lowest position. Pt updated on care plan/status. Will continue to monitor.

## 2021-04-22 NOTE — PROGRESS NOTES
Patient felt urge to urinate. Ambulated to bathroom, small drops of urine output. Bladder scan showed 609 mL. Order to place null by urology PA. Null catheter inserted without issue. 600 mL blood-tinged tea color urine returned immediately. Urine studies ordered overnight sent to lab at this time.     Electronically signed by Yasmeen Burt on 4/22/2021 at 9:35 AM

## 2021-04-23 VITALS
HEIGHT: 61 IN | TEMPERATURE: 98.5 F | HEART RATE: 55 BPM | SYSTOLIC BLOOD PRESSURE: 138 MMHG | DIASTOLIC BLOOD PRESSURE: 57 MMHG | RESPIRATION RATE: 17 BRPM | OXYGEN SATURATION: 98 % | BODY MASS INDEX: 24.55 KG/M2 | WEIGHT: 130 LBS

## 2021-04-23 LAB
ALBUMIN SERPL-MCNC: 3.3 G/DL (ref 3.4–5)
ANION GAP SERPL CALCULATED.3IONS-SCNC: 6 MMOL/L (ref 3–16)
BUN BLDV-MCNC: 28 MG/DL (ref 7–20)
CALCIUM SERPL-MCNC: 8.3 MG/DL (ref 8.3–10.6)
CHLORIDE BLD-SCNC: 111 MMOL/L (ref 99–110)
CO2: 17 MMOL/L (ref 21–32)
CREAT SERPL-MCNC: 1.8 MG/DL (ref 0.6–1.2)
GFR AFRICAN AMERICAN: 33
GFR NON-AFRICAN AMERICAN: 27
GLUCOSE BLD-MCNC: 82 MG/DL (ref 70–99)
HCT VFR BLD CALC: 30.3 % (ref 36–48)
HEMOGLOBIN: 9.8 G/DL (ref 12–16)
MCH RBC QN AUTO: 29.9 PG (ref 26–34)
MCHC RBC AUTO-ENTMCNC: 32.3 G/DL (ref 31–36)
MCV RBC AUTO: 92.5 FL (ref 80–100)
PDW BLD-RTO: 15.7 % (ref 12.4–15.4)
PHOSPHORUS: 3.7 MG/DL (ref 2.5–4.9)
PLATELET # BLD: 211 K/UL (ref 135–450)
PMV BLD AUTO: 8 FL (ref 5–10.5)
POTASSIUM SERPL-SCNC: 5.2 MMOL/L (ref 3.5–5.1)
RBC # BLD: 3.28 M/UL (ref 4–5.2)
SODIUM BLD-SCNC: 134 MMOL/L (ref 136–145)
WBC # BLD: 6.8 K/UL (ref 4–11)

## 2021-04-23 PROCEDURE — 6370000000 HC RX 637 (ALT 250 FOR IP): Performed by: INTERNAL MEDICINE

## 2021-04-23 PROCEDURE — 36415 COLL VENOUS BLD VENIPUNCTURE: CPT

## 2021-04-23 PROCEDURE — 51798 US URINE CAPACITY MEASURE: CPT

## 2021-04-23 PROCEDURE — 80069 RENAL FUNCTION PANEL: CPT

## 2021-04-23 PROCEDURE — 85027 COMPLETE CBC AUTOMATED: CPT

## 2021-04-23 PROCEDURE — 97535 SELF CARE MNGMENT TRAINING: CPT

## 2021-04-23 PROCEDURE — 2580000003 HC RX 258: Performed by: STUDENT IN AN ORGANIZED HEALTH CARE EDUCATION/TRAINING PROGRAM

## 2021-04-23 PROCEDURE — 99233 SBSQ HOSP IP/OBS HIGH 50: CPT | Performed by: INTERNAL MEDICINE

## 2021-04-23 PROCEDURE — 97166 OT EVAL MOD COMPLEX 45 MIN: CPT

## 2021-04-23 PROCEDURE — 6370000000 HC RX 637 (ALT 250 FOR IP): Performed by: STUDENT IN AN ORGANIZED HEALTH CARE EDUCATION/TRAINING PROGRAM

## 2021-04-23 PROCEDURE — 6370000000 HC RX 637 (ALT 250 FOR IP): Performed by: PHYSICIAN ASSISTANT

## 2021-04-23 PROCEDURE — 2580000003 HC RX 258: Performed by: INTERNAL MEDICINE

## 2021-04-23 RX ORDER — TAMSULOSIN HYDROCHLORIDE 0.4 MG/1
0.4 CAPSULE ORAL DAILY
Qty: 30 CAPSULE | Refills: 3 | Status: SHIPPED | OUTPATIENT
Start: 2021-04-24 | End: 2021-05-07 | Stop reason: ALTCHOICE

## 2021-04-23 RX ORDER — SODIUM POLYSTYRENE SULFONATE 15 G/60ML
15 SUSPENSION ORAL; RECTAL ONCE
Status: COMPLETED | OUTPATIENT
Start: 2021-04-23 | End: 2021-04-23

## 2021-04-23 RX ADMIN — ATENOLOL 25 MG: 25 TABLET ORAL at 08:49

## 2021-04-23 RX ADMIN — OXYCODONE HYDROCHLORIDE AND ACETAMINOPHEN 1 TABLET: 7.5; 325 TABLET ORAL at 09:01

## 2021-04-23 RX ADMIN — DOCUSATE SODIUM 50 MG AND SENNOSIDES 8.6 MG 2 TABLET: 8.6; 5 TABLET, FILM COATED ORAL at 08:52

## 2021-04-23 RX ADMIN — VENLAFAXINE HYDROCHLORIDE 75 MG: 75 CAPSULE, EXTENDED RELEASE ORAL at 08:51

## 2021-04-23 RX ADMIN — TAMSULOSIN HYDROCHLORIDE 0.4 MG: 0.4 CAPSULE ORAL at 08:49

## 2021-04-23 RX ADMIN — SODIUM POLYSTYRENE SULFONATE 15 G: 15 SUSPENSION ORAL; RECTAL at 11:46

## 2021-04-23 RX ADMIN — SODIUM CHLORIDE: 9 INJECTION, SOLUTION INTRAVENOUS at 04:20

## 2021-04-23 RX ADMIN — BISACODYL 5 MG: 5 TABLET, COATED ORAL at 08:49

## 2021-04-23 RX ADMIN — Medication 10 ML: at 08:48

## 2021-04-23 NOTE — PROGRESS NOTES
Hospitalist Progress Note      PCP: Katharine Wilson MD    Chief Complaint. Patient is a 72-year-old female with past medical history of chronic pain, narcotic dependence, hypertension hyperlipidemia, CVA with kidney stones who presented to hospital for obstructive hydronephrosis. Date of Admission: 4/21/2021    Subjective: Patient seen and evaluated at the bedside, denies chest pain, nausea, vomiting, shortness of breath, fever or chills. mention feels overall better    Medications:  Reviewed    Infusion Medications    sodium chloride       Scheduled Medications    nicotine  1 patch Transdermal Daily    tamsulosin  0.4 mg Oral Daily    atenolol  25 mg Oral Daily    venlafaxine  75 mg Oral Daily with breakfast    sodium chloride flush  5-40 mL Intravenous 2 times per day    bisacodyl  5 mg Oral Daily    sennosides-docusate sodium  2 tablet Oral BID     PRN Meds: hydrOXYzine, oxyCODONE-acetaminophen, sodium chloride flush, sodium chloride, promethazine **OR** ondansetron      Intake/Output Summary (Last 24 hours) at 4/23/2021 1452  Last data filed at 4/23/2021 1423  Gross per 24 hour   Intake 3115.42 ml   Output 1946 ml   Net 1169.42 ml       Physical Exam Performed:    BP (!) 138/57   Pulse 55   Temp 98.5 °F (36.9 °C) (Oral)   Resp 17   Ht 5' 1\" (1.549 m)   Wt 130 lb (59 kg)   SpO2 98%   BMI 24.56 kg/m²     General appearance: No apparent distress, sitting in chair comfortably  HEENT:  Conjunctivae/corneas clear. Neck: Supple, with full range of motion. Respiratory:  Normal respiratory effort. Clear to auscultation, bilaterally without Rales/Wheezes/Rhonchi. Cardiovascular: Regular rate and rhythm with normal S1/S2 without murmurs or rubs  Abdomen: Soft, non-tender, non-distended, normal bowel sounds. Musculoskeletal: No cyanosis or edema bilaterally  Neurologic:  without any focal sensory/motor deficits.  grossly non-focal.  Psychiatric: Alert and oriented, Normal mood  Peripheral Pulses: discharge per primary team, agree with the discharge plan      Zoey Penny MD

## 2021-04-23 NOTE — PROGRESS NOTES
Urology Attending Progress Note      Subjective: No  issues reported, feels good and is ready to go home. Vitals:  BP (!) 151/67   Pulse 63   Temp 97.7 °F (36.5 °C) (Oral)   Resp 17   Ht 5' 1\" (1.549 m)   Wt 130 lb (59 kg)   SpO2 96%   BMI 24.56 kg/m²   Temp  Av.9 °F (36.6 °C)  Min: 97.6 °F (36.4 °C)  Max: 98.3 °F (36.8 °C)    Intake/Output Summary (Last 24 hours) at 2021 0930  Last data filed at 2021 0854  Gross per 24 hour   Intake 3115.42 ml   Output 2500 ml   Net 615.42 ml       Exam: Null draining clear urine    Labs:  WBC:    Lab Results   Component Value Date    WBC 6.8 2021     Hemoglobin/Hematocrit:    Lab Results   Component Value Date    HGB 9.8 2021    HCT 30.3 2021     BMP:    Lab Results   Component Value Date     2021    K 5.2 2021     2021    CO2 17 2021    BUN 28 2021    LABALBU 3.3 2021    CREATININE 1.8 2021    CALCIUM 8.3 2021    GFRAA 33 2021    GFRAA >60 2013    LABGLOM 27 2021     PT/INR:    Lab Results   Component Value Date    PROTIME 10.9 2020    INR 0.94 2020     PTT:  No results found for: APTT[APTT      Imaging: No new     Impression/Plan: 69 yo F POD#2 sp cystoscopy, R urs, R ureteral biopsy and stent exchange.  -No  complaints, null draining clear.  -Cr 1.8 from 2.1. Spoke with nephrology who stated patient is stable for discharge.  -Spoke with patient about null management. She would like to have it removed before discharge. Order for null removal this morning.  -Continue flomax  -Bladder scan after first void. If patient is unable to void, ok to replace catheter and discharge with null.  -Will discharge her today. She will need to follow up with Dr. Abdulkadir Kimbrough in office in a few weeks to discuss pathology and surgical options. Prior BMP should be obtained.     ALANNA Pretty

## 2021-04-23 NOTE — PROGRESS NOTES
Patient voided 250 tea colored urine    Post-void bladder scan resulted 88 ml    Electronically signed by Catalina Macias RN on 4/23/2021 at 2:27 PM

## 2021-04-23 NOTE — CARE COORDINATION
CM following, pt from home alone, POD#1. Urology placed [facility] due to urinary retention creat 2.1 today nephro following, PT OT evals for DC recs as noted unstable gate with nursing.   Electronically signed by Mak Nj RN on 4/22/2021 at 2:39 PM  [phone]
The Patient and/or patient representative Jacky Montelongo and her family were provided with a choice of provider and agrees with the discharge plan Yes    Freedom of choice list was provided with basic dialogue that supports the patient's individualized plan of care/goals and shares the quality data associated with the providers.  Yes    Care Transitions patient: No    Melissa Bailey RN  [facility]  Case Management Department  Ph: [phone]  Fax: [phone]

## 2021-04-23 NOTE — PROGRESS NOTES
Occupational Therapy   Occupational Therapy Initial Assessment /Treatment  Date: 2021   Patient Name: Hong Dietrich  MRN: [id number]     : 1940    Date of Service: 2021    Discharge Recommendations:  Hong Dietrich scored a 21/24 on the AM-PAC ADL Inpatient form. Current research shows that an AM-PAC score of 18 or greater is typically associated with a discharge to the patient's home setting. Based on the patient's AM-PAC score, and their current ADL deficits, it is recommended that the patient have 2-3 sessions per week of Occupational Therapy at d/c to increase the patient's independence. At this time, this patient demonstrates the endurance and safety to discharge  home with home services and a follow up treatment frequency of 2-3x/wk. Please see assessment section for further patient specific details. If patient discharges prior to next session this note will serve as a discharge summary. Please see below for the latest assessment towards goals. S Level 1   HOME HEALTH CARE: LEVEL 1 STANDARD    - Initial home health evaluation to occur within 24-48 hours, in patient home   - Therapy to evaluate with goal of regaining prior level of functioning   - Therapy to evaluate if patient has [address] needs for personal care  OT Equipment Recommendations  Equipment Needed: No    Assessment   Performance deficits / Impairments: Decreased functional mobility ; Decreased ADL status; Decreased endurance  Assessment: Pt presetns with a decline in functional independence. Pt is requiring CG-SBA for functional mobility and some ADL. Feel pt would benefit from further OT services. Treatment Diagnosis: Impaired ADL and functional mobility  Prognosis: Good  Decision Making: Medium Complexity  OT Education: OT Role;Plan of Care  Patient Education: Pt verbalized understanding  Safety Devices  Safety Devices in place: Yes  Type of devices: Left in chair;Call light within reach; Chair alarm in place;Nurse notified         Treatment Diagnosis: Impaired ADL and functional mobility      Restrictions  Position Activity Restriction  Other position/activity restrictions: ambulate    Subjective   General  Chart Reviewed: Yes  Additional Pertinent Hx: Pt admitted 4/21/21 with hydronephrosis. Pt s/p CYSTOSCOPY, RIGHT URETEROSCOPY, WITH RETROGRADE, RIGHT URETERAL BIOPSY,  RIGHT STENT EXCHANGE on 4/21. PMH: CVA, HTN, hyperlipidemia, anxiety, L wrist ORIF  Family / Caregiver Present: No  Referring Practitioner: Dr. Franc Guerrero  Diagnosis: Hydronephrosis  Subjective  Subjective: Pt in bed upon entry. Pt agreeable to activity    Social/Functional History  Social/Functional History  Lives With: Friend(s)  Type of Home: House  Home Layout: Able to Live on Main level with bedroom/bathroom, Two level(laundry on first floor)  Home Access: (1 JIMMY)  Bathroom Shower/Tub: Tub/Shower unit  Bathroom Toilet: Standard(toilet safety frame around toilet)  Bathroom Equipment: Tub transfer bench, Grab bars in 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 \"Sometimes when I turn, I lose my balance and I just go. \"  3 falls in last few months. Had a stroke a few years ago - notes trouble with memory since then but denies any weakness. Friend she lives with is 80years old. Pt reports \"she does better than I do sometimes. We help each other. \"  Reports her friend will be with her at d/c.       Objective   Vision: Within Functional Limits(reading glasses)  Hearing: Within functional limits    Orientation  Overall Orientation Status: Within Normal Limits     Balance Sitting Balance: Supervision  Standing Balance: Contact guard assistance(to SBA)  Standing Balance  Time: ~3 min  Functional Mobility  Functional - Mobility Device: Cane  Activity: To/from bathroom  Assist Level: Contact guard assistance  Toilet Transfers  Toilet - Technique: Ambulating  Equipment Used: Standard toilet(grab bar)  Toilet Transfer: Contact guard assistance  ADL  Grooming: Stand by assistance(to wash hands and brush teeth, standing at sink)  LE Dressing: Stand by assistance  Toileting: (denied need)  Tone RUE  RUE Tone: Normotonic  Tone LUE  LUE Tone: Normotonic  Coordination  Movements Are Fluid And Coordinated: Yes     Bed mobility  Supine to Sit: Stand by assistance(HOB elevated)  Transfers  Stand Step Transfers: Contact guard assistance(to SBA)  Sit to stand: Contact guard assistance(to SBA)  Stand to sit: Contact guard assistance(to SBA)     Cognition  Overall Cognitive Status: WFL                 LUE AROM (degrees)  LUE AROM : WFL  RUE AROM (degrees)  RUE AROM : WFL             Treatment included ADL and transfer training.           Plan   Plan  Times per week: 2-5  Current Treatment Recommendations: Functional Mobility Training, Balance Training, Endurance Training, Strengthening, Self-Care / ADL    AM-PAC Score        AM-[facility] Inpatient Daily Activity Raw Score: 21 (04/23/21 1446)  AM-PAC Inpatient ADL T-Scale Score : 44.27 (04/23/21 1446)  ADL Inpatient CMS 0-100% Score: 32.79 (04/23/21 1446)  ADL Inpatient CMS G-Code Modifier : CJ (04/23/21 1446)    Goals                        No goals met  Short term goals  Time Frame for Short term goals: Discharge  Short term goal 1: Transfer to/from toilet with supervision  Short term goal 2: Stance with supervision x5 min while engaging in AD:/functional mobility  Short term goal 3: Lower body dressing with supervision  Patient Goals   Patient goals : Go home       Therapy Time   Individual Concurrent Group Co-treatment   Time In 6503         Time Out 5023 Minutes 29         Timed Code Treatment Minutes: Sourav OTR/L 57390

## 2021-04-23 NOTE — PLAN OF CARE
Problem: Pain:  Goal: Pain level will decrease  Description: Pain level will decrease  Outcome: Ongoing  Note: Patient alerts RN of increasing pain; RN administers pain medication as ordered      Problem: Falls - Risk of:  Goal: Will remain free from falls  Description: Will remain free from falls  Outcome: Ongoing  Note: Call light within reach; bed alarm engaged

## 2021-04-23 NOTE — DISCHARGE SUMMARY
Urology Discharge Summary      Patient Identification  Raysa Diaz is a 68 y.o. female. :  1940  Admit Date:  2021    Discharge date: 21                                  Disposition: home    Discharge Diagnoses:   Patient Active Problem List   Diagnosis    Anxiety and depression    Colon polyps    Tremor    Cigarette smoker    Hyperlipidemia    Essential hypertension    Thyroid nodule    Chronic bilateral low back pain without sciatica    Abnormal CT of the abdomen    Renal mass, right    Chronic right flank pain    Chronic, continuous use of opioids    Spondylolisthesis of lumbosacral region    Spinal stenosis of lumbar region with neurogenic claudication    Other osteoporosis without current pathological fracture    Other emphysema ([address]    CVA (cerebral vascular accident) ([address]    Neck pain on left side    Sleep difficulties    Hair loss    Preop examination    CHAPO (acute kidney injury) ([address]    Hydronephrosis    Electrolyte imbalance    Hyperkalemia       Surgery: Cystoscopy, R ureteroscopy, R ureteral biopsy with stent exchange. Activity:  no lifting or Strenuous exercise for 2-3 weeks    Condition on discharge: Stable    Follow-up: 2-3 weeks with Dr. Clemente Israel for future surgical discussion. Hospital course: Patient was brought to [place] [address] for procedure as stated above. Surgery went as planned with no complications. She was kept in the hospital due to CHAPO with nephrology consulted. Cr improved throughout her hospital stay. Andrade was removed prior to discharge for voiding trial. She was discharged home in stable condition and will follow up with Dr. Clemente Israel in office for discussion of future surgical procedures.      ALANNA Chapman

## 2021-04-23 NOTE — PROGRESS NOTES
Lupe d/hilary per order without issue.   Electronically signed by Bolivar Foster on 4/23/21 at 11:21 AM EDT

## 2021-04-24 ENCOUNTER — CARE COORDINATION (OUTPATIENT)
Dept: CASE MANAGEMENT | Age: 81
End: 2021-04-24

## 2021-04-24 NOTE — CARE COORDINATION
[facility] Transitions Initial Follow Up Call    Call within 2 business days of discharge: Yes    Patient: Nam Paredes Patient : 1940   MRN: [id number]  Reason for Admission: Hydronephrosis   Discharge Date: 21 RARS: Readmission Risk Score: 20      Last Discharge [address] [address]       Complaint Diagnosis Description Type Department Provider    21  Hydronephrosis, unspecified hydronephrosis type Admission (Discharged) Arielle Leiva MD           Attempted to reach patient via phone for initial post hospital transition call. VM left stating purpose of call along with my contact information requesting a return call. Follow Up  No future appointments.     Duane Sarabia RN

## 2021-04-24 NOTE — PROGRESS NOTES
Office : [phone]     Fax :[phone]         Renal Progress Note  Subjective:   Admit Date: 4/21/2021     HPI   This is a very pleasant 69 y. o. female with a history of chronic pain, narcotic dependence, hypertension, hyperlipidemia, CVA and kidney stones who was admitted for history of obstructive hydronephrosis with previous stent placement, by the urology service.  We were asked to see her with a view to assist in medical management.     She appears to have had hydronephrosis for several months with imaging in December 2020 showing: Marked hydronephrosis on the right of uncertain etiology. No obstructing calculus is identified.  Findings may relate to nonvisualized stone, stricture or neoplasm.     Of note also, was dilatation of the common bile duct, indeterminate.     She had CYSTOSCOPY, RIGHT URETEROSCOPY, WITH RETROGRADE, RIGHT URETERAL BIOPSY,  RIGHT STENT EXCHANGE 4/21/2021.       Interval History:     Good UO   Cr better   K 5.2   No N/V/D       DIET No diet orders on file  Medications:   Scheduled Meds:    Continuous Infusions:      Labs:  CBC:   Recent Labs     04/21/21  1306 04/22/21  0544 04/23/21  0519   WBC 6.0 5.3 6.8   HGB 11.6* 10.3* 9.8*    215 211     BMP:    Recent Labs     04/21/21  1509 04/22/21  0544 04/23/21  0519    137 134*   K 5.4* 5.3* 5.2*    109 111*   CO2 21 17* 17*   BUN 43* 36* 28*   CREATININE 2.7* 2.1* 1.8*   GLUCOSE 108* 99 82     Ca/Mg/Phos:   Recent Labs     04/21/21  1509 04/22/21  0544 04/23/21  0519   CALCIUM 8.7 8.0* 8.3   PHOS  --  4.4 3.7     Hepatic:   Recent Labs     04/22/21  0544   AST 12*   ALT 7*   BILITOT <0.2   ALKPHOS 70     Troponin: No results for input(s): TROPONINI in the last 72 hours. BNP: No results for input(s): BNP in the last 72 hours. Lipids: No results for input(s): CHOL, TRIG, HDL, LDLCALC, LABVLDL in the last 72 hours. ABGs: No results for input(s): PHART, PO2ART, KBQ8BSF in the last 72 hours. INR: No results for input(s): INR in the last 72 hours. UA:No results for input(s): Deloria Ki[place], GLUCOSEU, BILIRUBINUR, Philbert Iha, BLOODU, PHUR, PROTEINU, UROBILINOGEN, NITRU, LEUKOCYTESUR, LABMICR, URINETYPE in the last 72 hours. Urine Microscopic: No results for input(s): LABCAST, BACTERIA, COMU, HYALCAST, WBCUA, RBCUA, EPIU in the last 72 hours. Urine Culture: No results for input(s): LABURIN in the last 72 hours. Urine Chemistry:   Recent Labs     04/22/21  0943   LABCREA 55.6   PROTEINUR 75.70*       Objective:   Vitals: BP (!) 138/57   Pulse 55   Temp 98.5 °F (36.9 °C) (Oral)   Resp 17   Ht 5' 1\" (1.549 m)   Wt 130 lb (59 kg)   SpO2 98%   BMI 24.56 kg/m²    Wt Readings from Last 3 Encounters:   04/21/21 130 lb (59 kg)   03/30/21 137 lb 6.4 oz (62.3 kg)   12/18/20 130 lb (59 kg)      24HR INTAKE/OUTPUT:      Intake/Output Summary (Last 24 hours) at 4/23/2021 2318  Last data filed at 4/23/2021 1423  Gross per 24 hour   Intake    Output 1696 ml   Net -1696 ml     Constitutional:  Alert, awake, no apparent distress  NECK: supple, no JVD  Cardiovascular:  S1, S2 without m/r/g  Respiratory:  CTA B without w/r/r  Abdomen: +bs, soft, nt, nd  Ext: 1+ LE edema    Assessment and Plan:       IMAGING:  FL RETROGRADE PYELOGRAM W WO KUB   Final Result   See above          Assessment/Plan   1. CHAPO on CKD 3   - d/c IVF   - Hold home lasix  - Monitor I&O  - Daily RFP  - Urine studies - reviewed   - Dose adjust all medication based on renal function   - Maintain SBP >90  - Daily weights  - Avoid NSAIDs and other nephrotoxins     2.  HTN  - SBP in good range  - Continue oral atenolol - PRN Hydralazine  - Maintain SBP >90     3. Anemia - Mild  - Monitor H&H      4. Acid- base/ Electrolyte imbalance   - K elevated   - Low k diet     5.  Obstructive nephropathy     - Ureter stent replaced  - Monitor renal function        Dr. Rashmi Yi MD  [facility] [address] [place] [address] [phone]

## 2021-04-26 ENCOUNTER — CARE COORDINATION (OUTPATIENT)
Dept: CASE MANAGEMENT | Age: 81
End: 2021-04-26

## 2021-04-26 ENCOUNTER — TELEPHONE (OUTPATIENT)
Dept: INTERNAL MEDICINE CLINIC | Age: 81
End: 2021-04-26

## 2021-04-26 NOTE — CARE COORDINATION
patient on any new and changed medications related to discharge diagnosis     Was patient discharged with a pulse oximeter? No Discussed and confirmed pulse oximeter discharge instructions and when to notify provider or seek emergency care. CTN spoke with patient this am for initial COVID -19 Monitoring call. Patient states she is doing well, still a little weak, but feels she has gotten a bit stronger. No reports of any fever, chills, nausea, vomiting, chest pain, SOB or cough. CTN encouraged patient to continue to monitor for any of the above s/s, reporting any that may present to MD immediately. Plan for follow up call in 7-14 days based on severity of symptoms and risk factors.     Thank Obi Zaldivar RN  Care Transition Coordinator  Contact CEOXEI:[phone]

## 2021-04-26 NOTE — TELEPHONE ENCOUNTER
[facility] Transitions Initial Follow Up Call    Outreach made within 2 business days of discharge: Yes    Patient: Daniella Kidney Patient : 1940   MRN: <[id number]>  Reason for Admission: There are no discharge diagnoses documented for the most recent discharge. Discharge Date: 21       Spoke with: PATIENT  Discharge department/facility:     [facility] Interactive Patient Contact:  Was patient able to fill all prescriptions: Yes  Was patient instructed to bring all medications to the follow-up visit: Yes  Is patient taking all medications as directed in the discharge summary? Yes  Does patient understand their discharge instructions: Yes  Does patient have questions or concerns that need addressed prior to 7-14 day follow up office visit: yes -     Scheduled appointment with PCP within 7-14 days    Follow Up  Future Appointments   Date Time Provider Dimitri Clay   2021  1:30 PM Geni Capone MD [facility]   APPT 21 PATIENT MADE  [address]  WANT TO KEEP THIS DATE!     Prabhu Boyd MA

## 2021-04-29 PROBLEM — Z01.818 PREOP EXAMINATION: Status: RESOLVED | Noted: 2021-03-30 | Resolved: 2021-04-29

## 2021-05-07 ENCOUNTER — OFFICE VISIT (OUTPATIENT)
Dept: INTERNAL MEDICINE CLINIC | Age: 81
End: 2021-05-07
Payer: MEDICARE

## 2021-05-07 VITALS
HEIGHT: 63 IN | DIASTOLIC BLOOD PRESSURE: 70 MMHG | SYSTOLIC BLOOD PRESSURE: 130 MMHG | WEIGHT: 130 LBS | BODY MASS INDEX: 23.04 KG/M2

## 2021-05-07 DIAGNOSIS — N28.9 RENAL INSUFFICIENCY: ICD-10-CM

## 2021-05-07 DIAGNOSIS — I10 ESSENTIAL HYPERTENSION: ICD-10-CM

## 2021-05-07 DIAGNOSIS — G47.9 SLEEP DIFFICULTIES: ICD-10-CM

## 2021-05-07 DIAGNOSIS — F32.A ANXIETY AND DEPRESSION: ICD-10-CM

## 2021-05-07 DIAGNOSIS — M48.062 SPINAL STENOSIS OF LUMBAR REGION WITH NEUROGENIC CLAUDICATION: ICD-10-CM

## 2021-05-07 DIAGNOSIS — C66.1 UROTHELIAL CARCINOMA OF RIGHT DISTAL URETER (HCC): ICD-10-CM

## 2021-05-07 DIAGNOSIS — F41.9 ANXIETY AND DEPRESSION: ICD-10-CM

## 2021-05-07 DIAGNOSIS — G89.29 CHRONIC BILATERAL LOW BACK PAIN WITHOUT SCIATICA: ICD-10-CM

## 2021-05-07 DIAGNOSIS — M54.50 CHRONIC BILATERAL LOW BACK PAIN WITHOUT SCIATICA: ICD-10-CM

## 2021-05-07 DIAGNOSIS — N13.30 HYDRONEPHROSIS, UNSPECIFIED HYDRONEPHROSIS TYPE: ICD-10-CM

## 2021-05-07 DIAGNOSIS — I10 ESSENTIAL HYPERTENSION: Primary | ICD-10-CM

## 2021-05-07 PROBLEM — M43.17 SPONDYLOLISTHESIS OF LUMBOSACRAL REGION: Status: RESOLVED | Noted: 2017-06-21 | Resolved: 2021-05-07

## 2021-05-07 PROBLEM — M54.2 NECK PAIN ON LEFT SIDE: Status: RESOLVED | Noted: 2019-02-13 | Resolved: 2021-05-07

## 2021-05-07 PROBLEM — L65.9 HAIR LOSS: Status: RESOLVED | Noted: 2019-08-16 | Resolved: 2021-05-07

## 2021-05-07 PROCEDURE — G8399 PT W/DXA RESULTS DOCUMENT: HCPCS | Performed by: INTERNAL MEDICINE

## 2021-05-07 PROCEDURE — 1123F ACP DISCUSS/DSCN MKR DOCD: CPT | Performed by: INTERNAL MEDICINE

## 2021-05-07 PROCEDURE — G8427 DOCREV CUR MEDS BY ELIG CLIN: HCPCS | Performed by: INTERNAL MEDICINE

## 2021-05-07 PROCEDURE — 4004F PT TOBACCO SCREEN RCVD TLK: CPT | Performed by: INTERNAL MEDICINE

## 2021-05-07 PROCEDURE — 1090F PRES/ABSN URINE INCON ASSESS: CPT | Performed by: INTERNAL MEDICINE

## 2021-05-07 PROCEDURE — G8420 CALC BMI NORM PARAMETERS: HCPCS | Performed by: INTERNAL MEDICINE

## 2021-05-07 PROCEDURE — 4040F PNEUMOC VAC/ADMIN/RCVD: CPT | Performed by: INTERNAL MEDICINE

## 2021-05-07 PROCEDURE — 1111F DSCHRG MED/CURRENT MED MERGE: CPT | Performed by: INTERNAL MEDICINE

## 2021-05-07 PROCEDURE — 99214 OFFICE O/P EST MOD 30 MIN: CPT | Performed by: INTERNAL MEDICINE

## 2021-05-07 RX ORDER — MIRTAZAPINE 7.5 MG/1
7.5 TABLET, FILM COATED ORAL NIGHTLY
Qty: 30 TABLET | Refills: 5 | Status: ON HOLD | OUTPATIENT
Start: 2021-05-07 | End: 2021-07-27 | Stop reason: HOSPADM

## 2021-05-07 RX ORDER — HYDROXYZINE 50 MG/1
50 TABLET, FILM COATED ORAL 3 TIMES DAILY PRN
Qty: 90 TABLET | Refills: 3 | Status: SHIPPED | OUTPATIENT
Start: 2021-05-07 | End: 2022-05-17

## 2021-05-07 ASSESSMENT — ENCOUNTER SYMPTOMS
GASTROINTESTINAL NEGATIVE: 1
BACK PAIN: 1
SHORTNESS OF BREATH: 0

## 2021-05-07 NOTE — ASSESSMENT & PLAN NOTE
she asked avoid using Xanax in order for her to get full use of her opioid pain medication from her pain doctor. We will increase hydroxyzine to 50 mg 3 times daily as needed for anxiety. She will no longer be using the Xanax.

## 2021-05-07 NOTE — ASSESSMENT & PLAN NOTE
pain medication per pain doctor. The dose was cut back to her recent prescription for Xanax. She has now agreed no longer use Xanax so she can get full use for pain medication, which is paramount-more than her anxiety.

## 2021-05-07 NOTE — ASSESSMENT & PLAN NOTE
recent diagnosis. She has a stent in place. She will be following up with urology to discuss options.

## 2021-05-07 NOTE — PROGRESS NOTES
SUBJECTIVE:  Patient ID: Valeriano Garcia is an 68 y.o. female. HPI: Patient here today for the f/u of chronic problems-- see Problem List and associated comments. New issues or complaints include (also see Assessment for more details): Patient recently hospitalized with acute kidney injury and hydronephrosis. She was diagnosed with a right ureteral carcinoma and she has a new stent placed. She has not yet followed up with urology to discuss treatment options, which may be limited. She is feeling better overall, but she still has her same chronic pain from her back and legs. Her pain medication has been decreased due to her use of Arbie Dakin has now quit using Xanax that she get her pain medication back. Anxiety is a chronic problem. Review of Systems   Constitutional: Positive for fatigue. Negative for fever. Respiratory: Negative for shortness of breath. Cardiovascular: Negative for chest pain. Gastrointestinal: Negative. Genitourinary: Negative for difficulty urinating and dysuria. Musculoskeletal: Positive for back pain. Neurological: Positive for numbness. Psychiatric/Behavioral: Positive for sleep disturbance. Negative for dysphoric mood and suicidal ideas. The patient is nervous/anxious. OBJECTIVE:    /70 (Site: Left Upper Arm)   Ht 5' 3\" (1.6 m)   Wt 130 lb (59 kg)   BMI 23.03 kg/m²      Physical Exam  Constitutional:       General: She is not in acute distress. Appearance: She is well-developed. She is not diaphoretic. Eyes:      General: No scleral icterus. Extraocular Movements: Extraocular movements intact. Cardiovascular:      Rate and Rhythm: Normal rate and regular rhythm. Pulmonary:      Effort: Pulmonary effort is normal. No respiratory distress. Breath sounds: No stridor. Decreased breath sounds present. No wheezing or rhonchi. Abdominal:      General: Bowel sounds are normal.      Palpations: Abdomen is soft. Tenderness:  There is no

## 2021-05-07 NOTE — ASSESSMENT & PLAN NOTE
try mirtazapine 7.5 mg.  Try to avoid any benzodiazepines or similar drugs due to her use of narcotics.

## 2021-05-08 LAB
A/G RATIO: 1.7 (ref 1.1–2.2)
ALBUMIN SERPL-MCNC: 4.6 G/DL (ref 3.4–5)
ALP BLD-CCNC: 70 U/L (ref 40–129)
ALT SERPL-CCNC: 7 U/L (ref 10–40)
ANION GAP SERPL CALCULATED.3IONS-SCNC: 8 MMOL/L (ref 3–16)
AST SERPL-CCNC: 13 U/L (ref 15–37)
BASOPHILS ABSOLUTE: 0 K/UL (ref 0–0.2)
BASOPHILS RELATIVE PERCENT: 0.5 %
BILIRUB SERPL-MCNC: <0.2 MG/DL (ref 0–1)
BUN BLDV-MCNC: 34 MG/DL (ref 7–20)
CALCIUM SERPL-MCNC: 9.2 MG/DL (ref 8.3–10.6)
CHLORIDE BLD-SCNC: 105 MMOL/L (ref 99–110)
CO2: 24 MMOL/L (ref 21–32)
CREAT SERPL-MCNC: 1.3 MG/DL (ref 0.6–1.2)
EOSINOPHILS ABSOLUTE: 0.1 K/UL (ref 0–0.6)
EOSINOPHILS RELATIVE PERCENT: 1.1 %
GFR AFRICAN AMERICAN: 48
GFR NON-AFRICAN AMERICAN: 40
GLOBULIN: 2.7 G/DL
GLUCOSE BLD-MCNC: 92 MG/DL (ref 70–99)
HCT VFR BLD CALC: 33.8 % (ref 36–48)
HEMOGLOBIN: 11.1 G/DL (ref 12–16)
LYMPHOCYTES ABSOLUTE: 1.2 K/UL (ref 1–5.1)
LYMPHOCYTES RELATIVE PERCENT: 20.3 %
MCH RBC QN AUTO: 29.7 PG (ref 26–34)
MCHC RBC AUTO-ENTMCNC: 32.9 G/DL (ref 31–36)
MCV RBC AUTO: 90.3 FL (ref 80–100)
MONOCYTES ABSOLUTE: 0.5 K/UL (ref 0–1.3)
MONOCYTES RELATIVE PERCENT: 9.1 %
NEUTROPHILS ABSOLUTE: 4.1 K/UL (ref 1.7–7.7)
NEUTROPHILS RELATIVE PERCENT: 69 %
PDW BLD-RTO: 15.7 % (ref 12.4–15.4)
PLATELET # BLD: 286 K/UL (ref 135–450)
PMV BLD AUTO: 8.6 FL (ref 5–10.5)
POTASSIUM SERPL-SCNC: 4.9 MMOL/L (ref 3.5–5.1)
RBC # BLD: 3.75 M/UL (ref 4–5.2)
SODIUM BLD-SCNC: 137 MMOL/L (ref 136–145)
TOTAL PROTEIN: 7.3 G/DL (ref 6.4–8.2)
WBC # BLD: 6 K/UL (ref 4–11)

## 2021-05-10 ENCOUNTER — CARE COORDINATION (OUTPATIENT)
Dept: CASE MANAGEMENT | Age: 81
End: 2021-05-10

## 2021-05-10 NOTE — CARE COORDINATION
You Patient resolved from the Care Transitions episode on 05/10/2021    Patient/family has been provided the following resources and education related to COVID-19:                         Signs, symptoms and red flags related to COVID-19            CDC exposure and quarantine guidelines            Conduit exposure contact - [phone]            Contact for their local Department of Health                 Patient currently reports that the following symptoms have improved:  no new/worsening symptoms. Patient states she is doing well, reporting no complaints of any fever, chills, nausea, vomiting, chest pain, SOB or cough. No other issues or concerns at this time. No further outreach scheduled with this CTN/ACM. Episode of Care resolved. Patient has this CTN/ACM contact information if future needs arise.     Thank Hilaria Schilder, RN  Care Transition Coordinator  Contact SIMDRN:[phone]

## 2021-05-13 ENCOUNTER — TELEPHONE (OUTPATIENT)
Dept: INTERNAL MEDICINE CLINIC | Age: 81
End: 2021-05-13

## 2021-05-13 NOTE — TELEPHONE ENCOUNTER
Spoke to pt she stated she is not good. She said she has UTI before and she thinks that's what it is. She is shaky, has a headache, weak, and feels like crap she states. Urologist gave her antibiotic for 2 days. They gave her sulfamethoxazole. She fell on the side of her car today and couldn't get up and had to get help up.

## 2021-05-13 NOTE — TELEPHONE ENCOUNTER
If the antibiotics do not turn the situation around then she should probably go to the ER for evaluation.

## 2021-06-08 ENCOUNTER — PATIENT MESSAGE (OUTPATIENT)
Dept: INTERNAL MEDICINE CLINIC | Age: 81
End: 2021-06-08

## 2021-06-08 ENCOUNTER — TELEPHONE (OUTPATIENT)
Dept: INTERNAL MEDICINE CLINIC | Age: 81
End: 2021-06-08

## 2021-06-08 NOTE — TELEPHONE ENCOUNTER
Prescription Question    Heber HICKEY\"  Jaja Be MD 1 hour ago (7:22 AM)   TV  Dr. Raheel Ambrosio,  I recently got a notice from my pain management group that are no longer providing medical services for me ([facility]). This is so discourageing . I have been contacting several of these groups, which is a joke. If there is any compasion out there I can't find it. I know you know my pain history and it has gotten worse. It is depressing to think that I have to exist with this ongoing pain without help. Are they afraid I will get addicted, so what. I have actually cried some days because of the pain. I am 81 now and do not look forward to dealing with this. From my last prescription I only have enough pills until 6/19. I don't know what to do. .. Randal Cole Is there anything you can do to help me ????   Yun Carbajal

## 2021-06-08 NOTE — TELEPHONE ENCOUNTER
Make an appointment with me to discuss continuing the medication. If she has the notice from the pain group that was sent to her, bring it with her.

## 2021-06-09 ENCOUNTER — PATIENT MESSAGE (OUTPATIENT)
Dept: INTERNAL MEDICINE CLINIC | Age: 81
End: 2021-06-09

## 2021-06-09 DIAGNOSIS — G89.29 CHRONIC RIGHT FLANK PAIN: Primary | ICD-10-CM

## 2021-06-09 DIAGNOSIS — R10.9 CHRONIC RIGHT FLANK PAIN: Primary | ICD-10-CM

## 2021-06-09 NOTE — TELEPHONE ENCOUNTER
From: Fernandez Linda  To: Manasa Guzman MD  Sent: 6/9/2021 10:55 AM EDT  Subject: Prescription Question    I just tried to get an appt. and the soonest is june 21 st. I am runnung out of time trying to find a pain med DrDoyle  This is so frustrating ----I don't know where to turn. There seems to be no help for me.   Shon John

## 2021-06-09 NOTE — TELEPHONE ENCOUNTER
Non-Urgent Medical Question    Mickey Leon EDELMIRA\"  Paz Luz MD 2 minutes ago (11:14 AM)   TV  Dr. Arcenio Alarcon,  I contacted the [place] [facility] and they require a referral and the last 6 visits. . This is so crazy I feel like giving up. My brain can't seem to handle any of this anymore. Nothing is user friendly.   Shon John

## 2021-06-09 NOTE — TELEPHONE ENCOUNTER
From: Danny Garcia  To: Katharine Wilson MD  Sent: 6/9/2021 11:14 AM EDT  Subject: Non-Urgent Medical Question    Dr. Evelin Anderson,  I contacted the [place] [facility] and they require a referral and the last 6 visits. . This is so crazy I feel like giving up. My brain can't seem to handle any of this anymore. Nothing is user friendly.   Dora Franks

## 2021-06-09 NOTE — TELEPHONE ENCOUNTER
I just tried to get an appt. and the soonest is june 21 st.  I am runnung out of time trying to find a pain med DrDoyle  This is so frustrating ----I don't know where to turn. There seems to be no help for me. Yaneth Brown        Do you want me to use a provider fill?

## 2021-06-12 DIAGNOSIS — F41.9 ANXIETY AND DEPRESSION: ICD-10-CM

## 2021-06-12 DIAGNOSIS — F32.A ANXIETY AND DEPRESSION: ICD-10-CM

## 2021-06-14 RX ORDER — VENLAFAXINE HYDROCHLORIDE 75 MG/1
CAPSULE, EXTENDED RELEASE ORAL
Qty: 90 CAPSULE | Refills: 1 | Status: SHIPPED | OUTPATIENT
Start: 2021-06-14 | End: 2021-11-22

## 2021-06-25 ENCOUNTER — TELEPHONE (OUTPATIENT)
Dept: INTERNAL MEDICINE CLINIC | Age: 81
End: 2021-06-25

## 2021-06-25 NOTE — TELEPHONE ENCOUNTER
----- Message from Kaycee Castillo sent at 6/25/2021  2:25 PM EDT -----  Subject: Appointment Request    Reason for Call: Routine Pre-Op    QUESTIONS  Type of Appointment? Established Patient  Reason for appointment request? Available appointments did not meet   patient need  Additional Information for Provider? screen green/ pre op appt / pt wants   to schedule an appt 7/4/21-7/10/21.   ---------------------------------------------------------------------------  --------------  CALL BACK INFO  What is the best way for the office to contact you? Do not leave any   message, patient will call back for answer  Preferred Call Back Phone Number? [phone]  ---------------------------------------------------------------------------  --------------  SCRIPT ANSWERS  Relationship to Patient? Self  Appointment reason? Symptomatic  Select script based on patient symptoms? Adult Pre-Op  Do you have question for your provider that need to be answered prior to   scheduling your pre-op appointment? No  Have you been diagnosed with, awaiting test results for, or told that you   are suspected of having COVID-19 (Coronavirus)? (If patient has tested   negative or was tested as a requirement for work, school, or travel and   not based on symptoms, answer no)? No  Do you currently have flu-like symptoms including fever or chills, cough,   shortness of breath, difficulty breathing, or new loss of taste or smell? No  Have you had close contact with someone with COVID-19 in the last 14 days? No  (Service Expert  click yes below to proceed with Clifford Thames As Usual   Scheduling)?  Yes

## 2021-06-26 ENCOUNTER — APPOINTMENT (OUTPATIENT)
Dept: CT IMAGING | Age: 81
DRG: 682 | End: 2021-06-26
Payer: MEDICARE

## 2021-06-26 ENCOUNTER — APPOINTMENT (OUTPATIENT)
Dept: GENERAL RADIOLOGY | Age: 81
DRG: 682 | End: 2021-06-26
Payer: MEDICARE

## 2021-06-26 ENCOUNTER — HOSPITAL ENCOUNTER (INPATIENT)
Age: 81
LOS: 2 days | Discharge: HOME | DRG: 682 | End: 2021-06-28
Attending: EMERGENCY MEDICINE | Admitting: HOSPITALIST
Payer: MEDICARE

## 2021-06-26 DIAGNOSIS — S01.01XA LACERATION OF SCALP, INITIAL ENCOUNTER: ICD-10-CM

## 2021-06-26 DIAGNOSIS — E87.5 HYPERKALEMIA: ICD-10-CM

## 2021-06-26 DIAGNOSIS — E87.20 ACIDOSIS: ICD-10-CM

## 2021-06-26 DIAGNOSIS — I95.89 HYPOTENSION DUE TO HYPOVOLEMIA: ICD-10-CM

## 2021-06-26 DIAGNOSIS — E86.1 HYPOTENSION DUE TO HYPOVOLEMIA: ICD-10-CM

## 2021-06-26 DIAGNOSIS — R29.6 FALLS FREQUENTLY: ICD-10-CM

## 2021-06-26 DIAGNOSIS — N17.9 AKI (ACUTE KIDNEY INJURY) (HCC): Primary | ICD-10-CM
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
## 2021-06-26 LAB
A/G RATIO: 1.2 (ref 1.1–2.2)
ACETAMINOPHEN LEVEL: <5 UG/ML (ref 10–30)
ALBUMIN SERPL-MCNC: 4.1 G/DL (ref 3.4–5)
ALP BLD-CCNC: 74 U/L (ref 40–129)
ALT SERPL-CCNC: 12 U/L (ref 10–40)
AMORPHOUS: ABNORMAL /HPF
AMPHETAMINE SCREEN, URINE: ABNORMAL
ANION GAP SERPL CALCULATED.3IONS-SCNC: 11 MMOL/L (ref 3–16)
AST SERPL-CCNC: 16 U/L (ref 15–37)
BACTERIA: ABNORMAL /HPF
BARBITURATE SCREEN URINE: ABNORMAL
BASE EXCESS ARTERIAL: -7.6 MMOL/L (ref -3–3)
BASE EXCESS VENOUS: -7.5 MMOL/L (ref -2–3)
BASOPHILS ABSOLUTE: 0 K/UL (ref 0–0.2)
BASOPHILS RELATIVE PERCENT: 0.1 %
BENZODIAZEPINE SCREEN, URINE: ABNORMAL
BILIRUB SERPL-MCNC: <0.2 MG/DL (ref 0–1)
BILIRUBIN DIRECT: <0.2 MG/DL (ref 0–0.3)
BILIRUBIN URINE: NEGATIVE
BILIRUBIN, INDIRECT: NORMAL MG/DL (ref 0–1)
BLOOD, URINE: ABNORMAL
BUN BLDV-MCNC: 57 MG/DL (ref 7–20)
CALCIUM SERPL-MCNC: 8.7 MG/DL (ref 8.3–10.6)
CANNABINOID SCREEN URINE: ABNORMAL
CARBOXYHEMOGLOBIN ARTERIAL: 1.8 % (ref 0–1.5)
CARBOXYHEMOGLOBIN: 2.1 % (ref 0–1.5)
CHLORIDE BLD-SCNC: 106 MMOL/L (ref 99–110)
CLARITY: CLEAR
CO2: 19 MMOL/L (ref 21–32)
COCAINE METABOLITE SCREEN URINE: ABNORMAL
COLOR: YELLOW
CREAT SERPL-MCNC: 2.4 MG/DL (ref 0.6–1.2)
EKG ATRIAL RATE: 72 BPM
EKG DIAGNOSIS: NORMAL
EKG P AXIS: 72 DEGREES
EKG P-R INTERVAL: 140 MS
EKG Q-T INTERVAL: 384 MS
EKG QRS DURATION: 84 MS
EKG QTC CALCULATION (BAZETT): 420 MS
EKG R AXIS: -1 DEGREES
EKG T AXIS: 42 DEGREES
EKG VENTRICULAR RATE: 72 BPM
EOSINOPHILS ABSOLUTE: 0 K/UL (ref 0–0.6)
EOSINOPHILS RELATIVE PERCENT: 0.2 %
EPITHELIAL CELLS, UA: ABNORMAL /HPF (ref 0–5)
FERRITIN: 226 NG/ML (ref 15–150)
GFR AFRICAN AMERICAN: 24
GFR NON-AFRICAN AMERICAN: 19
GLOBULIN: 3.4 G/DL
GLUCOSE BLD-MCNC: 118 MG/DL (ref 70–99)
GLUCOSE BLD-MCNC: 119 MG/DL (ref 70–99)
GLUCOSE URINE: NEGATIVE MG/DL
HCO3 ARTERIAL: 18 MMOL/L (ref 21–29)
HCO3 VENOUS: 19.8 MMOL/L (ref 24–28)
HCT VFR BLD CALC: 31.9 % (ref 36–48)
HEMOGLOBIN, ART, EXTENDED: 10 G/DL
HEMOGLOBIN, VEN, REDUCED: 21.7 %
HEMOGLOBIN: 10.3 G/DL (ref 12–16)
KETONES, URINE: NEGATIVE MG/DL
LACTIC ACID, SEPSIS: 0.8 MMOL/L (ref 0.4–1.9)
LACTIC ACID, SEPSIS: 1.2 MMOL/L (ref 0.4–1.9)
LEUKOCYTE ESTERASE, URINE: ABNORMAL
LIPASE: 38 U/L (ref 13–60)
LYMPHOCYTES ABSOLUTE: 0.4 K/UL (ref 1–5.1)
LYMPHOCYTES RELATIVE PERCENT: 4.3 %
Lab: ABNORMAL
MAGNESIUM: 2.4 MG/DL (ref 1.8–2.4)
MCH RBC QN AUTO: 29.9 PG (ref 26–34)
MCHC RBC AUTO-ENTMCNC: 32.5 G/DL (ref 31–36)
MCV RBC AUTO: 92.1 FL (ref 80–100)
METHADONE SCREEN, URINE: ABNORMAL
METHEMOGLOBIN ARTERIAL: 0.6 % (ref 0–1.4)
METHEMOGLOBIN VENOUS: 0.8 % (ref 0–1.5)
MICROSCOPIC EXAMINATION: YES
MONOCYTES ABSOLUTE: 0.5 K/UL (ref 0–1.3)
MONOCYTES RELATIVE PERCENT: 5 %
NEUTROPHILS ABSOLUTE: 8.6 K/UL (ref 1.7–7.7)
NEUTROPHILS RELATIVE PERCENT: 90.4 %
NITRITE, URINE: NEGATIVE
O2 SAT, ARTERIAL: 94 % (ref 93–100)
O2 SAT, VEN: 78 %
OPIATE SCREEN URINE: ABNORMAL
OXYCODONE URINE: POSITIVE
PCO2 ARTERIAL: 33.5 MMHG (ref 35–45)
PCO2, VEN: 46.9 MMHG (ref 41–51)
PDW BLD-RTO: 15.4 % (ref 12.4–15.4)
PERFORMED ON: ABNORMAL
PH ARTERIAL: 7.33 (ref 7.35–7.45)
PH UA: 6
PH UA: 6 (ref 5–8)
PH VENOUS: 7.23 (ref 7.35–7.45)
PHENCYCLIDINE SCREEN URINE: ABNORMAL
PLATELET # BLD: 232 K/UL (ref 135–450)
PMV BLD AUTO: 8.2 FL (ref 5–10.5)
PO2 ARTERIAL: 63.9 MMHG (ref 75–108)
PO2, VEN: 44.7 MMHG (ref 25–40)
POTASSIUM REFLEX MAGNESIUM: 5.2 MMOL/L (ref 3.5–5.1)
PRO-BNP: 1581 PG/ML (ref 0–449)
PROCALCITONIN: 0.24 NG/ML (ref 0–0.15)
PROPOXYPHENE SCREEN: ABNORMAL
PROTEIN UA: 100 MG/DL
RBC # BLD: 3.46 M/UL (ref 4–5.2)
RBC UA: ABNORMAL /HPF (ref 0–4)
SALICYLATE, SERUM: <0.3 MG/DL (ref 15–30)
SODIUM BLD-SCNC: 136 MMOL/L (ref 136–145)
SPECIFIC GRAVITY UA: 1.02 (ref 1–1.03)
TCO2 ARTERIAL: 19 MMOL/L
TCO2 CALC VENOUS: 21 MMOL/L
TOTAL CK: 530 U/L (ref 26–192)
TOTAL PROTEIN: 7.5 G/DL (ref 6.4–8.2)
TROPONIN: <0.01 NG/ML
URINE REFLEX TO CULTURE: ABNORMAL
URINE TYPE: ABNORMAL
UROBILINOGEN, URINE: 0.2 E.U./DL
WBC # BLD: 9.5 K/UL (ref 4–11)
WBC UA: ABNORMAL /HPF (ref 0–5)

## 2021-06-26 PROCEDURE — 84145 PROCALCITONIN (PCT): CPT

## 2021-06-26 PROCEDURE — 51798 US URINE CAPACITY MEASURE: CPT

## 2021-06-26 PROCEDURE — 2580000003 HC RX 258: Performed by: STUDENT IN AN ORGANIZED HEALTH CARE EDUCATION/TRAINING PROGRAM

## 2021-06-26 PROCEDURE — 81001 URINALYSIS AUTO W/SCOPE: CPT

## 2021-06-26 PROCEDURE — 80307 DRUG TEST PRSMV CHEM ANLYZR: CPT

## 2021-06-26 PROCEDURE — 36415 COLL VENOUS BLD VENIPUNCTURE: CPT

## 2021-06-26 PROCEDURE — 87086 URINE CULTURE/COLONY COUNT: CPT

## 2021-06-26 PROCEDURE — 1200000000 HC SEMI PRIVATE

## 2021-06-26 PROCEDURE — 83036 HEMOGLOBIN GLYCOSYLATED A1C: CPT

## 2021-06-26 PROCEDURE — 85025 COMPLETE CBC W/AUTO DIFF WBC: CPT

## 2021-06-26 PROCEDURE — 6370000000 HC RX 637 (ALT 250 FOR IP): Performed by: STUDENT IN AN ORGANIZED HEALTH CARE EDUCATION/TRAINING PROGRAM

## 2021-06-26 PROCEDURE — 0HQ0XZZ REPAIR SCALP SKIN, EXTERNAL APPROACH: ICD-10-PCS | Performed by: HOSPITALIST

## 2021-06-26 PROCEDURE — 82746 ASSAY OF FOLIC ACID SERUM: CPT

## 2021-06-26 PROCEDURE — 84484 ASSAY OF TROPONIN QUANT: CPT

## 2021-06-26 PROCEDURE — 83605 ASSAY OF LACTIC ACID: CPT

## 2021-06-26 PROCEDURE — 99223 1ST HOSP IP/OBS HIGH 75: CPT | Performed by: INTERNAL MEDICINE

## 2021-06-26 PROCEDURE — 36600 WITHDRAWAL OF ARTERIAL BLOOD: CPT

## 2021-06-26 PROCEDURE — 99284 EMERGENCY DEPT VISIT MOD MDM: CPT

## 2021-06-26 PROCEDURE — 94664 DEMO&/EVAL PT USE INHALER: CPT

## 2021-06-26 PROCEDURE — 6360000002 HC RX W HCPCS: Performed by: STUDENT IN AN ORGANIZED HEALTH CARE EDUCATION/TRAINING PROGRAM

## 2021-06-26 PROCEDURE — 71045 X-RAY EXAM CHEST 1 VIEW: CPT

## 2021-06-26 PROCEDURE — 12001 RPR S/N/AX/GEN/TRNK 2.5CM/<: CPT

## 2021-06-26 PROCEDURE — 83735 ASSAY OF MAGNESIUM: CPT

## 2021-06-26 PROCEDURE — 93005 ELECTROCARDIOGRAM TRACING: CPT | Performed by: STUDENT IN AN ORGANIZED HEALTH CARE EDUCATION/TRAINING PROGRAM

## 2021-06-26 PROCEDURE — 83690 ASSAY OF LIPASE: CPT

## 2021-06-26 PROCEDURE — 82550 ASSAY OF CK (CPK): CPT

## 2021-06-26 PROCEDURE — 83880 ASSAY OF NATRIURETIC PEPTIDE: CPT

## 2021-06-26 PROCEDURE — 80053 COMPREHEN METABOLIC PANEL: CPT

## 2021-06-26 PROCEDURE — 80179 DRUG ASSAY SALICYLATE: CPT

## 2021-06-26 PROCEDURE — 83550 IRON BINDING TEST: CPT

## 2021-06-26 PROCEDURE — 82728 ASSAY OF FERRITIN: CPT

## 2021-06-26 PROCEDURE — 84443 ASSAY THYROID STIM HORMONE: CPT

## 2021-06-26 PROCEDURE — 83540 ASSAY OF IRON: CPT

## 2021-06-26 PROCEDURE — 82803 BLOOD GASES ANY COMBINATION: CPT

## 2021-06-26 PROCEDURE — 80143 DRUG ASSAY ACETAMINOPHEN: CPT

## 2021-06-26 PROCEDURE — 70450 CT HEAD/BRAIN W/O DYE: CPT

## 2021-06-26 PROCEDURE — 82607 VITAMIN B-12: CPT

## 2021-06-26 PROCEDURE — 72125 CT NECK SPINE W/O DYE: CPT

## 2021-06-26 RX ORDER — 0.9 % SODIUM CHLORIDE 0.9 %
250 INTRAVENOUS SOLUTION INTRAVENOUS ONCE
Status: COMPLETED | OUTPATIENT
Start: 2021-06-26 | End: 2021-06-26

## 2021-06-26 RX ORDER — SULFAMETHOXAZOLE AND TRIMETHOPRIM 800; 160 MG/1; MG/1
TABLET ORAL
Status: ON HOLD | COMMUNITY
Start: 2021-06-25 | End: 2021-06-28 | Stop reason: HOSPADM

## 2021-06-26 RX ORDER — ACETAMINOPHEN 325 MG/1
650 TABLET ORAL EVERY 4 HOURS PRN
Status: DISCONTINUED | OUTPATIENT
Start: 2021-06-26 | End: 2021-06-28 | Stop reason: HOSPADM

## 2021-06-26 RX ORDER — GABAPENTIN 300 MG/1
300 CAPSULE ORAL 2 TIMES DAILY PRN
Status: DISCONTINUED | OUTPATIENT
Start: 2021-06-26 | End: 2021-06-28 | Stop reason: HOSPADM

## 2021-06-26 RX ORDER — OXYCODONE AND ACETAMINOPHEN 7.5; 325 MG/1; MG/1
1 TABLET ORAL EVERY 6 HOURS PRN
Status: DISCONTINUED | OUTPATIENT
Start: 2021-06-26 | End: 2021-06-28 | Stop reason: HOSPADM

## 2021-06-26 RX ORDER — ATENOLOL 25 MG/1
25 TABLET ORAL DAILY
Status: DISCONTINUED | OUTPATIENT
Start: 2021-06-26 | End: 2021-06-28 | Stop reason: HOSPADM

## 2021-06-26 RX ORDER — ACETAMINOPHEN 325 MG/1
650 TABLET ORAL ONCE
Status: DISCONTINUED | OUTPATIENT
Start: 2021-06-26 | End: 2021-06-28 | Stop reason: HOSPADM

## 2021-06-26 RX ORDER — MIRTAZAPINE 15 MG/1
7.5 TABLET, FILM COATED ORAL NIGHTLY
Status: DISCONTINUED | OUTPATIENT
Start: 2021-06-26 | End: 2021-06-28 | Stop reason: HOSPADM

## 2021-06-26 RX ORDER — SODIUM CHLORIDE 9 MG/ML
INJECTION, SOLUTION INTRAVENOUS CONTINUOUS
Status: DISCONTINUED | OUTPATIENT
Start: 2021-06-26 | End: 2021-06-27

## 2021-06-26 RX ORDER — OXYCODONE AND ACETAMINOPHEN 7.5; 325 MG/1; MG/1
1 TABLET ORAL EVERY 6 HOURS PRN
COMMUNITY
End: 2021-07-19 | Stop reason: SDUPTHER

## 2021-06-26 RX ADMIN — OXYCODONE AND ACETAMINOPHEN 1 TABLET: 7.5; 325 TABLET ORAL at 16:34

## 2021-06-26 RX ADMIN — ATENOLOL 25 MG: 25 TABLET ORAL at 16:34

## 2021-06-26 RX ADMIN — CEFTRIAXONE 1000 MG: 1 INJECTION, POWDER, FOR SOLUTION INTRAMUSCULAR; INTRAVENOUS at 16:35

## 2021-06-26 RX ADMIN — SODIUM CHLORIDE 250 ML: 900 INJECTION, SOLUTION INTRAVENOUS at 21:24

## 2021-06-26 RX ADMIN — SODIUM CHLORIDE: 9 INJECTION, SOLUTION INTRAVENOUS at 17:23

## 2021-06-26 RX ADMIN — MIRTAZAPINE 7.5 MG: 15 TABLET, FILM COATED ORAL at 19:40

## 2021-06-26 ASSESSMENT — ENCOUNTER SYMPTOMS
CHEST TIGHTNESS: 0
VOMITING: 0
TROUBLE SWALLOWING: 0
DIARRHEA: 0
SORE THROAT: 0
EYE PAIN: 0
RHINORRHEA: 0
EYE REDNESS: 0
BACK PAIN: 0
ABDOMINAL PAIN: 0
SHORTNESS OF BREATH: 0
NAUSEA: 0
BLOOD IN STOOL: 0
COUGH: 0

## 2021-06-26 ASSESSMENT — PAIN DESCRIPTION - PAIN TYPE: TYPE: CHRONIC PAIN

## 2021-06-26 ASSESSMENT — PAIN DESCRIPTION - DESCRIPTORS: DESCRIPTORS: CONSTANT

## 2021-06-26 ASSESSMENT — PAIN DESCRIPTION - LOCATION: LOCATION: BACK

## 2021-06-26 ASSESSMENT — PAIN SCALES - GENERAL
PAINLEVEL_OUTOF10: 0
PAINLEVEL_OUTOF10: 8

## 2021-06-26 ASSESSMENT — PAIN DESCRIPTION - FREQUENCY: FREQUENCY: CONTINUOUS

## 2021-06-26 NOTE — H&P
Internal Medicine  PGY 1  History & Physical      CC multiple falls     History Obtained From:  patient    HISTORY OF PRESENT ILLNESS: 81 yo male with PMH anxiety, CVA p/w multiple falls. She is AOX3 not very forthcoming with the history as she has trouble staying awake but easy to awake. Pt reports she was feeding her cats when she got really dizzy and lightheaded and had fell. She hit her head but denies losing consciousness. She says following the first fall she tried to get up and finish her task but again had an episode of fall. This time she was unable to get up by herself so she crawled on the floor to reach to the phone to call the EMS. She denied any CP, SOB, palpitations, N/V, room-spinning sensation. In the ED, pts was afebrile, /53, HR 75, RR 16, spo2 96% on RA. VBG 7.233/46.9/44.7 CTH non-revealing. She had a scalp laceration which was stapled in the ED. Pt is a full code. Past Medical History:        Diagnosis Date    Anxiety     Colon polyps     check q5yrs    CVA (cerebral vascular accident) (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 Ignacia Sifuentes - recheck 5 yrs    CYSTOSCOPY      CYSTOSCOPY Right 4/21/2021    CYSTOSCOPY, RIGHT URETEROSCOPY, WITH RETROGRADE, RIGHT URETERAL BIOPSY,  RIGHT STENT EXCHANGE performed by Samira Garcia MD at [facility]  5/8/2015    sphincter and stent    EYE SURGERY Bilateral     cataract removal    MOUTH SURGERY      WRIST FRACTURE SURGERY Left 12/23/2016    OPEN REDUCTION INTERNAL FIXATION LEFT DISTAL RADIUS FRACTURE   ·     Medications Priorto Admission:    No current facility-administered medications on file prior to encounter.      Current Outpatient Medications on File Prior to Encounter   Medication Sig Dispense Refill    oxyCODONE-acetaminophen (PERCOCET) 7.5-325 MG per tablet Take 1 tablet by mouth every 6 hours as needed for Pain (chronic back pain. ). Chronic back pain.  atenolol (TENORMIN) 25 MG tablet TAKE ONE TABLET BY MOUTH DAILY 30 tablet 2    venlafaxine (EFFEXOR XR) 75 MG extended release capsule TAKE ONE CAPSULE BY MOUTH DAILY 90 capsule 1    hydrOXYzine (ATARAX) 50 MG tablet Take 1 tablet by mouth 3 times daily as needed for Anxiety 90 tablet 3    mirtazapine (REMERON) 7.5 MG tablet Take 1 tablet by mouth nightly 30 tablet 5    gabapentin (NEURONTIN) 300 MG capsule Take 300 mg by mouth 2 times daily as needed.  sulfamethoxazole-trimethoprim (BACTRIM DS;SEPTRA DS) 800-160 MG per tablet      ·     Allergies:  Penicillins    Social History:   · TOBACCO:   reports that she has been smoking cigarettes. She started smoking about 63 years ago. She has a 50.00 pack-year smoking history. She has never used smokeless tobacco.  · ETOH:   reports no history of alcohol use. · DRUGS : none   · Patient currently lives at home with her friend   ·   Family History:       Problem Relation Age of Onset    Diabetes Mother     Heart Disease Father     Cancer Brother     Kidney Disease Brother    ·       ROS: A 10 point review of systems was conducted, significant findings as noted in HPI. Physical exam:       Vitals:    06/26/21 1554   BP: 139/73   Pulse: 78   Resp: 20   Temp: 98.4 °F (36.9 °C)   SpO2: 97%     General: drowsy, unable to stay awake during interaction, No acute distress, cooperative, appears stated age  Eye: PERRLA, EOMI, Normal Conjunctiva  HENT: Normocephalic, Normal Hearing, Moist oral mucosa, no pharyngeal edema.  Unable to move eyes to L side   Neck: Supple, Non-tender, no carotid bruit, no JVD, no lymphadenopathy, no thyromegaly  Respiratory: Lungs clear to auscultation bilaterally, Non-labored respirations, BS equal, symmetrical expansion, no chest wall tenderness  Cardiovascular: Normal rate, regular rhythm, no murmurs, no gallops, good pulses in all extremities, normal peripheral perfusion, no edema  Gastrointestinal: Soft, Non-tender, non-distended, normal bowel sounds, no organomegaly  Musculoskeletal: Normal ROM, Normal strength, No tenderness, No swelling, No deformity  Feet: Normal by visual exam, Normal pulses, Sensation intact  Integumentary: Warm, Dry, Pink, Intact, Moist, No pallor, No rash  Neurologic: drowsy, unable to stay awake, difficulty performing finger nose test and diadochokinesia . Psychiatric: Cooperative, appropriate mood and affect, normal judgment, non-suicidal    DATA:    Labs:  CBC:   Recent Labs     06/26/21  1026   WBC 9.5   HGB 10.3*   HCT 31.9*          BMP:   Recent Labs     06/26/21  1026      K 5.2*      CO2 19*   BUN 57*   CREATININE 2.4*   GLUCOSE 118*     LFT's:   Recent Labs     06/26/21  1026   AST 16   ALT 12   BILITOT <0.2   ALKPHOS 74     Troponin:   Recent Labs     06/26/21  1026   TROPONINI <0.01     BNP:No results for input(s): BNP in the last 72 hours. ABGs:   Recent Labs     06/26/21  1517   PHART 7.327*   MDY3XAD 33.5*   PO2ART 63.9*     INR: No results for input(s): INR in the last 72 hours. U/A:  Recent Labs     06/26/21  1217   COLORU Yellow   PHUR 6.0   WBCUA 3-5   RBCUA 21-50*   BACTERIA 2+*   CLARITYU Clear   SPECGRAV 1.025   LEUKOCYTESUR TRACE*   UROBILINOGEN 0.2   BILIRUBINUR Negative   BLOODU LARGE*   GLUCOSEU Negative   AMORPHOUS 2+       CT Cervical Spine WO Contrast   Final Result      No evidence of acute cervical spine injury. 6 mm nodular opacity in the right upper lobe. CT Head WO Contrast   Final Result      Atrophy and white matter ischemic changes noted diffusely. No acute hemorrhage or mass effect identified. Focal hypodensity seen in left thalamus, likely related to prior infarct.                XR CHEST PORTABLE   Final Result      Question of mild

## 2021-06-26 NOTE — CONSULTS
ago. She has a 50.00 pack-year smoking history. She has never used smokeless tobacco. She reports that she does not drink alcohol and does not use drugs.     Allergies:  Penicillins    Current Medications:    sodium bicarbonate tablet 650 mg, BID  venlafaxine (EFFEXOR XR) extended release capsule 75 mg, Daily with breakfast  heparin (porcine) injection 5,000 Units, Q8H  acetaminophen (TYLENOL) tablet 650 mg, Once  atenolol (TENORMIN) tablet 25 mg, Daily  [Held by provider] gabapentin (NEURONTIN) capsule 300 mg, BID PRN  mirtazapine (REMERON) tablet 7.5 mg, Nightly  acetaminophen (TYLENOL) tablet 650 mg, Q4H PRN  bisacodyl (DULCOLAX) EC tablet 5 mg, Daily PRN  perflutren lipid microspheres (DEFINITY) injection 1.65 mg, ONCE PRN  oxyCODONE-acetaminophen (PERCOCET) 7.5-325 MG per tablet 1 tablet, Q6H PRN  cefTRIAXone (ROCEPHIN) 1000 mg IVPB in 50 mL D5W minibag, Q24H        Review of Systems:   14 point ROS obtained but were negative except mentioned in HPI      Physical exam:     Vitals:  /62   Pulse 64   Temp 97.5 °F (36.4 °C) (Oral)   Resp 16   Ht 5' 1\" (1.549 m)   Wt 130 lb (59 kg)   SpO2 95%   BMI 24.56 kg/m²   Constitutional:  OAA X3 NAD  Skin: no rash, turgor wnl  Heent:  eomi, mmm  Neck: no bruits or jvd noted  Cardiovascular:  S1, S2 without m/r/g  Respiratory: CTA B without w/r/r  Abdomen:  +bs, soft, nt, nd  Ext: no lower extremity edema  Psychiatric: mood and affect appropriate  Musculoskeletal:  Rom, muscular strength intact    Data:   Labs:  CBC:   Recent Labs     06/26/21  1026 06/27/21  0644   WBC 9.5 6.1   HGB 10.3* 9.8*    196     BMP:    Recent Labs     06/26/21  1026 06/27/21  0644 06/27/21  1623    138  --    K 5.2* 5.4* 5.2*    111*  --    CO2 19* 18*  --    BUN 57* 50*  --    CREATININE 2.4* 2.3*  --    GLUCOSE 118* 81  --      Ca/Mg/Phos:   Recent Labs     06/26/21  1015 06/26/21  1026 06/27/21  0644 06/27/21  1623   CALCIUM  --  8.7 8.2*  --    MG 2.40  --   -- 2.30     Hepatic:   Recent Labs     06/26/21  1026   AST 16   ALT 12   BILITOT <0.2   ALKPHOS 74     Troponin:   Recent Labs     06/26/21  1026 06/26/21  1605 06/26/21  1927   TROPONINI <0.01 <0.01 <0.01     BNP: No results for input(s): BNP in the last 72 hours. Lipids: No results for input(s): CHOL, TRIG, HDL, LDLCALC, LABVLDL in the last 72 hours. ABGs:   Recent Labs     06/26/21  1517   PHART 7.327*   PO2ART 63.9*   UQB3TDQ 33.5*     INR: No results for input(s): INR in the last 72 hours. UA:  Recent Labs     06/26/21  1217 06/26/21  1217 06/26/21  1950   COLORU Yellow  --   --    CLARITYU Clear  --   --    Monique More Negative  --   --    BILIRUBINUR Negative  --   --    Minor Mauldin Negative  --   --    SPECGRAV 1.025  --   --    BLOODU LARGE*  --   --    PHUR 6.0   < > 6.0   PROTEINU 100*  --   --    UROBILINOGEN 0.2  --   --    NITRU Negative  --   --    LEUKOCYTESUR TRACE*  --   --    LABMICR YES  --   --    URINETYPE NotGiven  --   --     < > = values in this interval not displayed. Urine Microscopic:   Recent Labs     06/26/21  1217   BACTERIA 2+*   WBCUA 3-5   RBCUA 21-50*   EPIU 2-5     Urine Culture:   Recent Labs     06/26/21  1300   LABURIN No growth at 18 to 36 hours     Urine Chemistry:   Recent Labs     06/27/21  0244   LABCREA 84.9   PROTEINUR 59.10*             IMAGING:  CT Cervical Spine WO Contrast   Final Result      No evidence of acute cervical spine injury. 6 mm nodular opacity in the right upper lobe. CT Head WO Contrast   Final Result      Atrophy and white matter ischemic changes noted diffusely. No acute hemorrhage or mass effect identified. Focal hypodensity seen in left thalamus, likely related to prior infarct. XR CHEST PORTABLE   Final Result      Question of mild perihilar congestive change. Assessment/Plan   1. CHAPO on CKD 3     2. HTN    3. Anemia    4. Acid- base/ Electrolyte imbalance     5.  UTI     Plan   - Low K diet   - D/c IVF - No diuretics   - Ur studies   -    - labs in am     Recommend to dose adjust all medications  based on renal functions  Maintain SBP> 90 mmHg   Daily weights   AVOID NSAIDs  Avoid Nephrotoxins  Monitor Intake/Output  Call if significant decrease in urine output                 Thank you for allowing us to participate in care of Emilia Soliz MD  Feel free to contact me   [facility] [address]  Office : [phone]  Fax :[phone]

## 2021-06-26 NOTE — ED NOTES
Patient has 100ml cloudy urine in cannister from [address]. States she voided about 30 minutes ago, denies urge to void presently.   Bladder scanned and 28 ml urine revealed     Elsie Zarate RN  06/26/21 [id number]

## 2021-06-26 NOTE — ED NOTES
Place a pure wick on patient. Cleaned patients wound on her head with saline water.   Informed RN and Dr. Jena Aiken  06/26/21 [phone]

## 2021-06-26 NOTE — PROGRESS NOTES
4 Eyes Admission Assessment     I agree as the admission nurse that 2 RN's have performed a thorough Head to Toe Skin Assessment on the patient. ALL assessment sites listed below have been assessed on admission. Areas assessed by both nurses: yes  [x]   Head, Face, and Ears   [x]   Shoulders, Back, and Chest  [x]   Arms, Elbows, and Hands   [x]   Coccyx, Sacrum, and Ischium  [x]   Legs, Feet, and Heels        Does the Patient have Skin Breakdown? No , scattered scratches, bruises, and scabs. Dry flaky skin. Blanchable redness on the pt's bilateral lower legs, small bruise on coccyx. Nicolás Prevention initiated:  NA   Wound Care Orders initiated:  NA      WOC nurse consulted for Pressure Injury (Stage 3,4, Unstageable, DTI, NWPT, and Complex wounds) or Nicolás score 18 or lower:  NA      Nurse 1 eSignature: Electronically signed by Madison Ruff on 6/26/21 at 4:09 PM EDT    **SHARE this note so that the co-signing nurse is able to place an eSignature**    Nurse 2 eSignature: Electronically signed by Ines Hinson RN on 6/26/21 at 4:25 PM EDT

## 2021-06-26 NOTE — Clinical Note
Patient Class: Inpatient [101]   REQUIRED: Diagnosis: Fall at home, sequela [638929]   Estimated Length of Stay: Estimated stay of more than 2 midnights   Admitting Provider: Mariama Clement [[id number]]   Telemetry/Cardiac Monitoring Required?: Yes

## 2021-06-26 NOTE — ED NOTES
Report called to Dong, receiving RN for [id number]   Located within [facility] to transport     Eliceo Keys RN  06/26/21 9624

## 2021-06-26 NOTE — ED PROVIDER NOTES
[address] [facility] RESIDENT NOTE       Date of evaluation: 6/26/2021    Chief Complaint     Fall (Pt reports 4 falls since 3am. Pt states she gets dizzy and falls backwards. Recent UTI dx and started abx yesterday.) and Head Injury      History of Present Illness     Tevin Slater is a 80 y.o. female who presents to the emergency department after multiple falls at home today. She has had 4 falls at home the first of which occurred around 3 AM this morning and the last half hour prior to arrival of EMS. Patient denies any prodrome while in says that she is just walking when she suddenly falls backwards. She has a small laceration on her head covered in dried blood. She denies any fevers or chills, changes in oral intake, nausea, vomiting, abdominal pain, chest pain, or shortness of breath. She was recently diagnosed with a urinary tract infection and started on an antibiotic (unclear which antibiotic) with the first dose started yesterday. She does have a history of CKD secondary to obstruction from kidney stones as well as a history of kidney polyps for which she is currently undergoing outpatient work-up. Review of Systems     Review of Systems   Constitutional: Negative for activity change, appetite change, chills, fatigue and fever. HENT: Negative for congestion, ear pain, rhinorrhea, sore throat and trouble swallowing. Eyes: Negative for pain and redness. Respiratory: Negative for cough, chest tightness and shortness of breath. No hemoptysis   Cardiovascular: Negative for chest pain, palpitations and leg swelling. Gastrointestinal: Negative for abdominal pain, blood in stool, diarrhea, nausea and vomiting. Genitourinary: Negative for dysuria and hematuria. Musculoskeletal: Negative for back pain and myalgias. Skin: Positive for wound. Negative for rash. Neurological: Negative for dizziness, weakness and headaches.         No sensation changes  Positive loss of consciousness and falls   Psychiatric/Behavioral: Negative for dysphoric mood and suicidal ideas. All other systems reviewed and are negative. Past Medical, Surgical, Family, and Social History     She has a past medical history of Anxiety, Colon polyps, CVA (cerebral vascular accident) ([address] HTN (hypertension), Hyperlipidemia, Kidney stones, Proteinuria, and Screening mammogram.  She has a past surgical history that includes Colonoscopy (2010); ERCP (5/8/2015); Wrist fracture surgery (Left, 12/23/2016); Cystoscopy; Mouth surgery; eye surgery (Bilateral); and Cystoscopy (Right, 4/21/2021). Her family history includes Cancer in her brother; Diabetes in her mother; Heart Disease in her father; Kidney Disease in her brother. She reports that she has been smoking cigarettes. She started smoking about 63 years ago. She has a 50.00 pack-year smoking history. She has never used smokeless tobacco. She reports that she does not drink alcohol and does not use drugs. Medications     Current Discharge Medication List      CONTINUE these medications which have NOT CHANGED    Details   oxyCODONE-acetaminophen (PERCOCET) 7.5-325 MG per tablet Take 1 tablet by mouth every 6 hours as needed for Pain (chronic back pain. ). Chronic back pain. atenolol (TENORMIN) 25 MG tablet TAKE ONE TABLET BY MOUTH DAILY  Qty: 30 tablet, Refills: 2      venlafaxine (EFFEXOR XR) 75 MG extended release capsule TAKE ONE CAPSULE BY MOUTH DAILY  Qty: 90 capsule, Refills: 1    Associated Diagnoses: Anxiety and depression      hydrOXYzine (ATARAX) 50 MG tablet Take 1 tablet by mouth 3 times daily as needed for Anxiety  Qty: 90 tablet, Refills: 3    Associated Diagnoses: Anxiety and depression      mirtazapine (REMERON) 7.5 MG tablet Take 1 tablet by mouth nightly  Qty: 30 tablet, Refills: 5      gabapentin (NEURONTIN) 300 MG capsule Take 300 mg by mouth 2 times daily as needed. sulfamethoxazole-trimethoprim (BACTRIM DS;SEPTRA DS) 800-160 MG per tablet              Allergies     She is allergic to penicillins. Physical Exam     INITIAL VITALS: BP: (!) 111/53, Temp: 99 °F (37.2 °C), Pulse: 75, Resp: 16, SpO2: 96 %   Physical Exam  Vitals and nursing note reviewed. Exam conducted with a chaperone present. Constitutional:       General: She is not in acute distress. Appearance: Normal appearance. She is not ill-appearing. Comments: Lethargic/somnolent    HENT:      Head: Normocephalic. Comments: Dried blood on the right occiput with a small laceration (.25 cm)     Nose: Nose normal.      Mouth/Throat:      Mouth: Mucous membranes are moist.      Pharynx: Oropharynx is clear. Eyes:      Extraocular Movements: Extraocular movements intact. Conjunctiva/sclera: Conjunctivae normal.      Pupils: Pupils are equal, round, and reactive to light. Cardiovascular:      Rate and Rhythm: Normal rate and regular rhythm. Pulses: Normal pulses. Heart sounds: Normal heart sounds. No murmur heard. No friction rub. No gallop. Pulmonary:      Effort: Pulmonary effort is normal. No respiratory distress. Breath sounds: Normal breath sounds. No wheezing, rhonchi or rales. Abdominal:      General: Abdomen is flat. Bowel sounds are normal.      Palpations: Abdomen is soft. Tenderness: There is no abdominal tenderness. There is no guarding or rebound. Musculoskeletal:         General: No swelling, tenderness, deformity or signs of injury. Normal range of motion. Cervical back: Normal range of motion and neck supple. No muscular tenderness. Right lower leg: No edema. Left lower leg: No edema. Skin:     General: Skin is warm and dry. Capillary Refill: Capillary refill takes less than 2 seconds. Findings: No rash. Neurological:      General: No focal deficit present. Mental Status: She is oriented to person, place, and time. Mental status is at baseline. Cranial Nerves: No cranial nerve deficit. Sensory: No sensory deficit. Motor: No weakness. Psychiatric:         Mood and Affect: Mood normal.         Behavior: Behavior normal.         Thought Content: Thought content normal.         DiagnosticResults     EKG   Interpreted in conjunction with emergencydepartment physician No att. providers found  Rhythm: normal sinus   Rate: normal  Axis: left  Ectopy: none  Conduction: normal  ST Segments: no acute change   T Waves:no acute change  Q Waves: present in lead 3, and V1 - V3  Clinical Impression: no acute changes  Comparison:  3/30/21    RADIOLOGY:  CT Cervical Spine WO Contrast   Final Result      No evidence of acute cervical spine injury. 6 mm nodular opacity in the right upper lobe. CT Head WO Contrast   Final Result      Atrophy and white matter ischemic changes noted diffusely. No acute hemorrhage or mass effect identified. Focal hypodensity seen in left thalamus, likely related to prior infarct. XR CHEST PORTABLE   Final Result      Question of mild perihilar congestive change.              LABS:   Results for orders placed or performed during the hospital encounter of 06/26/21   CBC Auto Differential   Result Value Ref Range    WBC 9.5 4.0 - 11.0 K/uL    RBC 3.46 (L) 4.00 - 5.20 M/uL    Hemoglobin 10.3 (L) 12.0 - 16.0 g/dL    Hematocrit 31.9 (L) 36.0 - 48.0 %    MCV 92.1 80.0 - 100.0 fL    MCH 29.9 26.0 - 34.0 pg    MCHC 32.5 31.0 - 36.0 g/dL    RDW 15.4 12.4 - 15.4 %    Platelets 205 916 - 798 K/uL    MPV 8.2 5.0 - 10.5 fL    Neutrophils % 90.4 %    Lymphocytes % 4.3 %    Monocytes % 5.0 %    Eosinophils % 0.2 %    Basophils % 0.1 %    Neutrophils Absolute 8.6 (H) 1.7 - 7.7 K/uL    Lymphocytes Absolute 0.4 (L) 1.0 - 5.1 K/uL    Monocytes Absolute 0.5 0.0 - 1.3 K/uL    Eosinophils Absolute 0.0 0.0 - 0.6 K/uL    Basophils Absolute 0.0 0.0 - 0.2 K/uL   Comprehensive Metabolic Panel w/ Reflex to MG   Result Value Ref Range    Sodium 136 136 - 145 mmol/L    Potassium reflex Magnesium 5.2 (H) 3.5 - 5.1 mmol/L    Chloride 106 99 - 110 mmol/L    CO2 19 (L) 21 - 32 mmol/L    Anion Gap 11 3 - 16    Glucose 118 (H) 70 - 99 mg/dL    BUN 57 (H) 7 - 20 mg/dL    CREATININE 2.4 (H) 0.6 - 1.2 mg/dL    GFR Non- 19 (A) >60    GFR  24 (A) >60    Calcium 8.7 8.3 - 10.6 mg/dL    Total Protein 7.5 6.4 - 8.2 g/dL    Albumin 4.1 3.4 - 5.0 g/dL    Albumin/Globulin Ratio 1.2 1.1 - 2.2    Total Bilirubin <0.2 0.0 - 1.0 mg/dL    Alkaline Phosphatase 74 40 - 129 U/L    ALT 12 10 - 40 U/L    AST 16 15 - 37 U/L    Globulin 3.4 g/dL   Lactate, Sepsis   Result Value Ref Range    Lactic Acid, Sepsis 0.8 0.4 - 1.9 mmol/L   Lactate, Sepsis   Result Value Ref Range    Lactic Acid, Sepsis 1.2 0.4 - 1.9 mmol/L   Urinalysis Reflex to Culture    Specimen: Urine, clean catch   Result Value Ref Range    Color, UA Yellow Straw/Yellow    Clarity, UA Clear Clear    Glucose, Ur Negative Negative mg/dL    Bilirubin Urine Negative Negative    Ketones, Urine Negative Negative mg/dL    Specific Gravity, UA 1.025 1.005 - 1.030    Blood, Urine LARGE (A) Negative    pH, UA 6.0 5.0 - 8.0    Protein,  (A) Negative mg/dL    Urobilinogen, Urine 0.2 <2.0 E.U./dL    Nitrite, Urine Negative Negative    Leukocyte Esterase, Urine TRACE (A) Negative    Microscopic Examination YES     Urine Type NotGiven     Urine Reflex to Culture Not Indicated    Blood Gas, Venous   Result Value Ref Range    pH, Javed 7.233 (L) 7.350 - 7.450    pCO2, Javed 46.9 41.0 - 51.0 mmHg    pO2, Javed 44.7 (H) 25 - 40 mmHg    HCO3, Venous 19.8 (L) 24.0 - 28.0 mmol/L    Base Excess, Javed -7.5 (L) -2.0 - 3.0 mmol/L    O2 Sat, Javed 78 Not established %    Carboxyhemoglobin 2.1 (H) 0.0 - 1.5 %    MetHgb, Javed 0.8 0.0 - 1.5 %    TC02 (Calc), Javed 21 mmol/L    Hemoglobin, Javed, Reduced 21.70 %   Hepatic Function Panel   Result Value Ref Range    Bilirubin, Direct <0.2 0.0 - 0.3 mg/dL    Bilirubin, Indirect see below 0.0 - 1.0 mg/dL   Lipase   Result Value Ref Range    Lipase 38.0 13.0 - 60.0 U/L   Troponin   Result Value Ref Range    Troponin <0.01 <0.01 ng/mL   Brain Natriuretic Peptide   Result Value Ref Range    Pro-BNP 1,581 (H) 0 - 449 pg/mL   Acetaminophen level   Result Value Ref Range    Acetaminophen Level <5 (L) 10 - 30 ug/mL   Salicylate   Result Value Ref Range    Salicylate, Serum <0.2 (L) 15.0 - 30.0 mg/dL   Microscopic Urinalysis   Result Value Ref Range    WBC, UA 3-5 0 - 5 /HPF    RBC, UA 21-50 (A) 0 - 4 /HPF    Epithelial Cells, UA 2-5 0 - 5 /HPF    Bacteria, UA 2+ (A) None Seen /HPF    Amorphous, UA 2+ /HPF   Blood gas, arterial   Result Value Ref Range    pH, Arterial 7.327 (L) 7.350 - 7.450    pCO2, Arterial 33.5 (L) 35.0 - 45.0 mmHg    pO2, Arterial 63.9 (L) 75.0 - 108.0 mmHg    HCO3, Arterial 18 (L) 21 - 29 mmol/L    Base Excess, Arterial -7.6 (L) -3.0 - 3.0 mmol/L    Hemoglobin, Art, Extended 10.00 g/dL    O2 Sat, Arterial 94 93 - 100 %    Carboxyhgb, Arterial 1.8 (H) 0.0 - 1.5 %    Methemoglobin, Arterial 0.6 0.0 - 1.4 %    TCO2, Arterial 19 mmol/L   Magnesium   Result Value Ref Range    Magnesium 2.40 1.80 - 2.40 mg/dL   Troponin   Result Value Ref Range    Troponin <0.01 <0.01 ng/mL   Procalcitonin   Result Value Ref Range    Procalcitonin 0.24 (H) 0.00 - 0.15 ng/mL   POCT Glucose   Result Value Ref Range    POC Glucose 119 (H) 70 - 99 mg/dl    Performed on [employer]    EKG 12 Lead   Result Value Ref Range    Ventricular Rate 72 BPM    Atrial Rate 72 BPM    P-R Interval 140 ms    QRS Duration 84 ms    Q-T Interval 384 ms    QTc Calculation (Bazett) 420 ms    P Axis 72 degrees    R Axis -1 degrees    T Axis 42 degrees    Diagnosis       EKG performed in ER and to be interpreted by ER physician. Confirmed by MD, ER ([id number]),  Lexii Peres ([phone]) on 6/26/2021 10:33:03 AM       RECENT VITALS:  BP: 139/73, Temp: 98.4 °F (36.9 °C), Pulse: the patient to their service. This patient was also evaluated by the attending physician. All care plans werediscussed and agreed upon. Clinical Impression     1. CHAPO (acute kidney injury) ([facility] [address]    2. Falls frequently    3. Acidosis    4. Hyperkalemia    5. Laceration of scalp, initial encounter        Disposition     PATIENT REFERRED TO:  No follow-up provider specified.     DISCHARGE MEDICATIONS:  Current Discharge Medication List          DISPOSITION         Judith Lozada MD  06/26/21 [id number]

## 2021-06-26 NOTE — PROGRESS NOTES
Pt arrived to room 5527, pt oriented to her new room and call light. Pt's bed alarm on for her safety. Pt was asking for percocet for her chronic back pain, the pt states that is what she takes at home. Messaged MD about the pt's request and updated her med rec. Pt's admission was already completed by the admission nurse. \"4 eye skin assessment\" done with another RN, pt placed on tele per MD order. Pt placed on a pure wick per her request.  Pt states she hasn't been up yet since she came here to the hospital but states that she normally gets around at home with a walker. Pt states when she fell at home she was dizzy and that it wasn't a mech. Fall. I will continue to follow throughout the shift.   Ayleen Emanuel, RN

## 2021-06-26 NOTE — ED NOTES
Bed: A04-04  Expected date:   Expected time:   Means of arrival:   Comments:  jeffry Flanagan RN  06/26/21 [id number]

## 2021-06-27 PROBLEM — N18.31 ACUTE RENAL FAILURE SUPERIMPOSED ON STAGE 3A CHRONIC KIDNEY DISEASE (HCC): Status: ACTIVE | Noted: 2021-04-21

## 2021-06-27 LAB
ANION GAP SERPL CALCULATED.3IONS-SCNC: 9 MMOL/L (ref 3–16)
BASOPHILS ABSOLUTE: 0 K/UL (ref 0–0.2)
BASOPHILS RELATIVE PERCENT: 0.5 %
BUN BLDV-MCNC: 50 MG/DL (ref 7–20)
CALCIUM SERPL-MCNC: 8.2 MG/DL (ref 8.3–10.6)
CHLORIDE BLD-SCNC: 111 MMOL/L (ref 99–110)
CO2: 18 MMOL/L (ref 21–32)
CREAT SERPL-MCNC: 2.3 MG/DL (ref 0.6–1.2)
CREATININE URINE: 84.9 MG/DL (ref 28–259)
EOSINOPHILS ABSOLUTE: 0.3 K/UL (ref 0–0.6)
EOSINOPHILS RELATIVE PERCENT: 4.6 %
ESTIMATED AVERAGE GLUCOSE: 111.2 MG/DL
FOLATE: 5.45 NG/ML (ref 4.78–24.2)
GFR AFRICAN AMERICAN: 25
GFR NON-AFRICAN AMERICAN: 20
GLUCOSE BLD-MCNC: 81 MG/DL (ref 70–99)
HBA1C MFR BLD: 5.5 %
HCT VFR BLD CALC: 29.7 % (ref 36–48)
HEMOGLOBIN: 9.8 G/DL (ref 12–16)
IRON SATURATION: 13 % (ref 15–50)
IRON: 30 UG/DL (ref 37–145)
LYMPHOCYTES ABSOLUTE: 0.9 K/UL (ref 1–5.1)
LYMPHOCYTES RELATIVE PERCENT: 13.9 %
MAGNESIUM: 2.3 MG/DL (ref 1.8–2.4)
MCH RBC QN AUTO: 30.4 PG (ref 26–34)
MCHC RBC AUTO-ENTMCNC: 33 G/DL (ref 31–36)
MCV RBC AUTO: 92.1 FL (ref 80–100)
MONOCYTES ABSOLUTE: 0.6 K/UL (ref 0–1.3)
MONOCYTES RELATIVE PERCENT: 9.4 %
NEUTROPHILS ABSOLUTE: 4.4 K/UL (ref 1.7–7.7)
NEUTROPHILS RELATIVE PERCENT: 71.6 %
PDW BLD-RTO: 15.1 % (ref 12.4–15.4)
PLATELET # BLD: 196 K/UL (ref 135–450)
PMV BLD AUTO: 7.7 FL (ref 5–10.5)
POTASSIUM REFLEX MAGNESIUM: 5.4 MMOL/L (ref 3.5–5.1)
POTASSIUM SERPL-SCNC: 5.2 MMOL/L (ref 3.5–5.1)
PROTEIN PROTEIN: 59.1 MG/DL
RBC # BLD: 3.22 M/UL (ref 4–5.2)
SODIUM BLD-SCNC: 138 MMOL/L (ref 136–145)
TOTAL IRON BINDING CAPACITY: 240 UG/DL (ref 260–445)
TSH REFLEX: 0.55 UIU/ML (ref 0.27–4.2)
URINE CULTURE, ROUTINE: NORMAL
VITAMIN B-12: 251 PG/ML (ref 211–911)
WBC # BLD: 6.1 K/UL (ref 4–11)

## 2021-06-27 PROCEDURE — 6370000000 HC RX 637 (ALT 250 FOR IP): Performed by: INTERNAL MEDICINE

## 2021-06-27 PROCEDURE — 80048 BASIC METABOLIC PNL TOTAL CA: CPT

## 2021-06-27 PROCEDURE — 6370000000 HC RX 637 (ALT 250 FOR IP): Performed by: STUDENT IN AN ORGANIZED HEALTH CARE EDUCATION/TRAINING PROGRAM

## 2021-06-27 PROCEDURE — 83735 ASSAY OF MAGNESIUM: CPT

## 2021-06-27 PROCEDURE — 1200000000 HC SEMI PRIVATE

## 2021-06-27 PROCEDURE — 93005 ELECTROCARDIOGRAM TRACING: CPT | Performed by: STUDENT IN AN ORGANIZED HEALTH CARE EDUCATION/TRAINING PROGRAM

## 2021-06-27 PROCEDURE — 85025 COMPLETE CBC W/AUTO DIFF WBC: CPT

## 2021-06-27 PROCEDURE — 82570 ASSAY OF URINE CREATININE: CPT

## 2021-06-27 PROCEDURE — 6360000002 HC RX W HCPCS: Performed by: STUDENT IN AN ORGANIZED HEALTH CARE EDUCATION/TRAINING PROGRAM

## 2021-06-27 PROCEDURE — 36415 COLL VENOUS BLD VENIPUNCTURE: CPT

## 2021-06-27 PROCEDURE — 2580000003 HC RX 258: Performed by: STUDENT IN AN ORGANIZED HEALTH CARE EDUCATION/TRAINING PROGRAM

## 2021-06-27 PROCEDURE — 99233 SBSQ HOSP IP/OBS HIGH 50: CPT | Performed by: INTERNAL MEDICINE

## 2021-06-27 PROCEDURE — 99222 1ST HOSP IP/OBS MODERATE 55: CPT | Performed by: HOSPITALIST

## 2021-06-27 PROCEDURE — 84156 ASSAY OF PROTEIN URINE: CPT

## 2021-06-27 PROCEDURE — 84132 ASSAY OF SERUM POTASSIUM: CPT

## 2021-06-27 RX ORDER — HEPARIN SODIUM 5000 [USP'U]/ML
5000 INJECTION, SOLUTION INTRAVENOUS; SUBCUTANEOUS EVERY 8 HOURS
Status: DISCONTINUED | OUTPATIENT
Start: 2021-06-27 | End: 2021-06-28 | Stop reason: HOSPADM

## 2021-06-27 RX ORDER — SODIUM BICARBONATE 650 MG/1
650 TABLET ORAL 2 TIMES DAILY
Status: DISCONTINUED | OUTPATIENT
Start: 2021-06-27 | End: 2021-06-28 | Stop reason: HOSPADM

## 2021-06-27 RX ORDER — VENLAFAXINE HYDROCHLORIDE 75 MG/1
75 CAPSULE, EXTENDED RELEASE ORAL
Status: DISCONTINUED | OUTPATIENT
Start: 2021-06-27 | End: 2021-06-28 | Stop reason: HOSPADM

## 2021-06-27 RX ADMIN — OXYCODONE AND ACETAMINOPHEN 1 TABLET: 7.5; 325 TABLET ORAL at 02:26

## 2021-06-27 RX ADMIN — SODIUM BICARBONATE 650 MG: 650 TABLET ORAL at 10:25

## 2021-06-27 RX ADMIN — ATENOLOL 25 MG: 25 TABLET ORAL at 08:17

## 2021-06-27 RX ADMIN — HEPARIN SODIUM 5000 UNITS: 5000 INJECTION INTRAVENOUS; SUBCUTANEOUS at 20:16

## 2021-06-27 RX ADMIN — MIRTAZAPINE 7.5 MG: 15 TABLET, FILM COATED ORAL at 20:16

## 2021-06-27 RX ADMIN — OXYCODONE AND ACETAMINOPHEN 1 TABLET: 7.5; 325 TABLET ORAL at 14:54

## 2021-06-27 RX ADMIN — SODIUM BICARBONATE 650 MG: 650 TABLET ORAL at 20:16

## 2021-06-27 RX ADMIN — OXYCODONE AND ACETAMINOPHEN 1 TABLET: 7.5; 325 TABLET ORAL at 08:17

## 2021-06-27 RX ADMIN — HEPARIN SODIUM 5000 UNITS: 5000 INJECTION INTRAVENOUS; SUBCUTANEOUS at 13:03

## 2021-06-27 RX ADMIN — CEFTRIAXONE 1000 MG: 1 INJECTION, POWDER, FOR SOLUTION INTRAMUSCULAR; INTRAVENOUS at 16:35

## 2021-06-27 RX ADMIN — OXYCODONE AND ACETAMINOPHEN 1 TABLET: 7.5; 325 TABLET ORAL at 21:07

## 2021-06-27 RX ADMIN — VENLAFAXINE HYDROCHLORIDE 75 MG: 75 CAPSULE, EXTENDED RELEASE ORAL at 13:03

## 2021-06-27 ASSESSMENT — PAIN DESCRIPTION - LOCATION
LOCATION: BACK
LOCATION: BACK

## 2021-06-27 ASSESSMENT — PAIN SCALES - GENERAL
PAINLEVEL_OUTOF10: 3
PAINLEVEL_OUTOF10: 7
PAINLEVEL_OUTOF10: 7
PAINLEVEL_OUTOF10: 5
PAINLEVEL_OUTOF10: 8
PAINLEVEL_OUTOF10: 6

## 2021-06-27 ASSESSMENT — PAIN DESCRIPTION - DESCRIPTORS
DESCRIPTORS: ACHING;CONSTANT
DESCRIPTORS: CONSTANT

## 2021-06-27 ASSESSMENT — PAIN DESCRIPTION - FREQUENCY
FREQUENCY: CONTINUOUS
FREQUENCY: CONTINUOUS

## 2021-06-27 ASSESSMENT — PAIN DESCRIPTION - PAIN TYPE
TYPE: CHRONIC PAIN
TYPE: CHRONIC PAIN

## 2021-06-27 ASSESSMENT — PAIN - FUNCTIONAL ASSESSMENT: PAIN_FUNCTIONAL_ASSESSMENT: PREVENTS OR INTERFERES SOME ACTIVE ACTIVITIES AND ADLS

## 2021-06-27 ASSESSMENT — PAIN DESCRIPTION - ONSET: ONSET: ON-GOING

## 2021-06-27 ASSESSMENT — PAIN DESCRIPTION - PROGRESSION
CLINICAL_PROGRESSION: NOT CHANGED
CLINICAL_PROGRESSION: NOT CHANGED

## 2021-06-27 NOTE — PROGRESS NOTES
Vitals:    06/26/21 2037   BP: (!) 90/55   Pulse:    Resp:    Temp:    SpO2:      BP soft. Resident paged. See new orders. Pt has no c/o of dizziness. Pt resting in bed. Voiding to pure-wick with adequate output. IV fluids running at 100 ml/hr. Urine drug screen sent. Bed alarm set. Call light in reach. Will continue to monitor. update :  Vitals:    06/27/21 0225   BP: 130/62   Pulse: 63   Resp: 16   Temp: 97.5 °F (36.4 °C)   SpO2: 95%     BP improved after bolus.

## 2021-06-27 NOTE — PROGRESS NOTES
Internal Medicine PGY-1 Resident Progress Note        PCP: Bobbi Vital MD    Date of Admission: 6/26/2021    Chief Complaint: Fall    Subjective:   No acute events overnight. Patient seen at the bedside this AM.  States that she is feeling better. She is alert and oriented x4. Endorses improvement in dysuria. Denies any dizziness, headache, chest pain, shortness of breath, cough, fever/chills, nausea, vomiting, diarrhea or constipation. Patient is tolerating p.o. appropriately. Medications:  Reviewed    Infusion Medications   Scheduled Medications    sodium bicarbonate  650 mg Oral BID    venlafaxine  75 mg Oral Daily with breakfast    acetaminophen  650 mg Oral Once    atenolol  25 mg Oral Daily    mirtazapine  7.5 mg Oral Nightly    cefTRIAXone (ROCEPHIN) IV  1,000 mg Intravenous Q24H     PRN Meds: [Held by provider] gabapentin, acetaminophen, bisacodyl, perflutren lipid microspheres, oxyCODONE-acetaminophen      Intake/Output Summary (Last 24 hours) at 6/27/2021 1119  Last data filed at 6/27/2021 1035  Gross per 24 hour   Intake 1291 ml   Output 860 ml   Net 431 ml       Physical Exam Performed:    BP (!) 112/55   Pulse 64   Temp 98.3 °F (36.8 °C) (Axillary)   Resp 18   Ht 5' 1\" (1.549 m)   Wt 130 lb (59 kg)   SpO2 93%   BMI 24.56 kg/m²     General appearance: No apparent distress, Frail, appears stated age and cooperative. HEENT: Pupils equal, round, and reactive to light. Conjunctivae/corneas clear. Neck: Supple, with full range of motion. No jugular venous distention. Trachea midline. Respiratory:  Normal respiratory effort. Bilateral bibasilar crackles, bilaterally without Rales/Wheezes/Rhonchi. Cardiovascular: Regular rate and rhythm with normal S1/S2 without murmurs, rubs or gallops. Abdomen: Soft, non-tender, non-distended with normal bowel sounds. Musculoskeletal: No clubbing, cyanosis or edema bilaterally. Full range of motion without deformity.  No pitting edema in b/l LE  Skin: Skin color, texture, turgor normal.  No rashes or lesions. Neurologic:  Neurovascularly intact without any focal sensory/motor deficits. Cranial nerves: II-XII intact, grossly non-focal.  Psychiatric: Alert and oriented x4, thought content appropriate, normal insight  Capillary Refill: Brisk,< 3 seconds   Peripheral Pulses: +2 palpable, equal bilaterally       Labs:   Recent Labs     06/26/21  1026 06/27/21  0644   WBC 9.5 6.1   HGB 10.3* 9.8*   HCT 31.9* 29.7*    196     Recent Labs     06/26/21  1026 06/27/21  0644    138   K 5.2* 5.4*    111*   CO2 19* 18*   BUN 57* 50*   CREATININE 2.4* 2.3*   CALCIUM 8.7 8.2*     Recent Labs     06/26/21  1026   AST 16   ALT 12   BILIDIR <0.2   BILITOT <0.2   ALKPHOS 74     No results for input(s): INR in the last 72 hours. Recent Labs     06/26/21  1026 06/26/21  1605 06/26/21  1927   CKTOTAL  --  530*  --    TROPONINI <0.01 <0.01 <0.01       Urinalysis:      Lab Results   Component Value Date    NITRU Negative 06/26/2021    WBCUA 3-5 06/26/2021    BACTERIA 2+ 06/26/2021    RBCUA 21-50 06/26/2021    BLOODU LARGE 06/26/2021    SPECGRAV 1.025 06/26/2021    GLUCOSEU Negative 06/26/2021    GLUCOSEU Negative 10/07/2011       Radiology:  CT Cervical Spine WO Contrast   Final Result      No evidence of acute cervical spine injury. 6 mm nodular opacity in the right upper lobe. CT Head WO Contrast   Final Result      Atrophy and white matter ischemic changes noted diffusely. No acute hemorrhage or mass effect identified. Focal hypodensity seen in left thalamus, likely related to prior infarct. XR CHEST PORTABLE   Final Result      Question of mild perihilar congestive change.                  Assessment/Plan:  81 yo female with PMH of anxiety, CVA, HTN, HDL p/w multiple falls, admitted for concern of metabolic encephalopathy.     UTI  Reports having multiple episodes of falls overnight, while feeding her cats, reports getting dizzy and lightheaded, denies any LOC or prodrome but did hit her head. Pt reports having dysuria since a weak, was started bactrim, took 2 doses prior to admission.  - IVF for CHAPO for 15 hr in view of new elevated Pro-bnp   - UA: cloudy, +2 bacteria, trace leukocyte esterase s/p 2 doses of Bactrim outpt  - f/u urine cult, pro-angely 0.24  - continuous tele   - f/u A1c, last one was 5 years ago 5.3   - TSH WNL  - UDS   - Cont rocephin    Metabolic encephalopathy 2/2 UTI - resolved  - CTH non-revealing  - management as above    Hyperkalemia  - Low K diet  - Sodium bicarb  - Nephro on board    Chronic normocytic anemia  RDW WNL  - Iron 30, Ferritin 226, Iron sats 13, TIBC 240 (Low)  - Will defer Iron until out of acute infection     Scalp laceration R side from fall s/p staple   - stable, CDI     CHAPO  - elevated Cr 2.4 from 1.3 in may 2021   - trend BMP  - DC'd fluids, pt tolerating PO.  - avoid nephrotoxic meds  - , f/u Vt B12,      Elevated pro-bnp  - consider ordering echo, no previous echo found in the chart    - trend BNP every 3rd day     DVT Prophylaxis: SQH  Diet: ADULT DIET; Regular; Low Potassium (Less than 3000 mg/day)  Code Status: Full Code  PT/OT Eval Status: Pending  Dispo - GMF, possible DC tomorrow    I will discuss the patient with the senior resident and MD Efren Haider MD  Internal Medicine Resident PGY-1    Addendum to Resident H& P/Progress note:  I have personally seen,examined and evaluated the patient.  I have reviewed the current history, physical findings, labs and assessment and plan and agree with note as documented by resident MD ( Josue Daily)      Carrillo Ace MD, [address]

## 2021-06-27 NOTE — CONSULTS
Nephrology  Note                                                                                                                                                                                                                                                                                                                                                               Office : [phone]     Fax :[phone]              Patient's Name: Fly Costello    Good UO   K better   Cr better   No Dizziness        Past Medical History:   Diagnosis Date    Anxiety     Colon polyps     check q5yrs    CVA (cerebral vascular accident) ([address] 10/12/2018    Lacunar, left hemisphere    HTN (hypertension) 10/11    Hyperlipidemia     Kidney stones     Proteinuria 10/11    Screening mammogram 2010    benign       Past Surgical History:   Procedure Laterality Date    COLONOSCOPY  2010    Dr. Mikhail Orozco - recheck 5 yrs    CYSTOSCOPY      CYSTOSCOPY Right 4/21/2021    CYSTOSCOPY, RIGHT URETEROSCOPY, WITH RETROGRADE, RIGHT URETERAL BIOPSY,  RIGHT STENT EXCHANGE performed by Mariama Harkins MD at [address] [facility]  5/8/2015    sphincter and stent    EYE SURGERY Bilateral     cataract removal    MOUTH SURGERY      WRIST FRACTURE SURGERY Left 12/23/2016    OPEN REDUCTION INTERNAL FIXATION LEFT DISTAL RADIUS FRACTURE       Family History   Problem Relation Age of Onset    Diabetes Mother     Heart Disease Father     Cancer Brother     Kidney Disease Brother         reports that she has been smoking cigarettes. She started smoking about 63 years ago. She has a 50.00 pack-year smoking history. She has never used smokeless tobacco. She reports that she does not drink alcohol and does not use drugs.     Allergies:  Penicillins    Current Medications:    sodium bicarbonate tablet 650 mg, BID  acetaminophen (TYLENOL) tablet 650 mg, Once  atenolol (TENORMIN) tablet 25 mg, Daily  [Held by provider] gabapentin (NEURONTIN) capsule 300 mg, BID PRN  mirtazapine (REMERON) tablet 7.5 mg, Nightly  acetaminophen (TYLENOL) tablet 650 mg, Q4H PRN  bisacodyl (DULCOLAX) EC tablet 5 mg, Daily PRN  perflutren lipid microspheres (DEFINITY) injection 1.65 mg, ONCE PRN  oxyCODONE-acetaminophen (PERCOCET) 7.5-325 MG per tablet 1 tablet, Q6H PRN  cefTRIAXone (ROCEPHIN) 1000 mg IVPB in 50 mL D5W minibag, Q24H        Review of Systems:   14 point ROS obtained but were negative except mentioned in HPI      Physical exam:     Vitals:  /67   Pulse 62   Temp 98.5 °F (36.9 °C) (Oral)   Resp 16   Ht 5' 1\" (1.549 m)   Wt 130 lb (59 kg)   SpO2 92%   BMI 24.56 kg/m²   Constitutional:  OAA X3 NAD  Skin: no rash, turgor wnl  Heent:  eomi, mmm  Neck: no bruits or jvd noted  Cardiovascular:  S1, S2 without m/r/g  Respiratory: CTA B without w/r/r  Abdomen:  +bs, soft, nt, nd  Ext: + lower extremity edema  Psychiatric: mood and affect appropriate  Musculoskeletal:  Rom, muscular strength intact    Data:   Labs:  CBC:   Recent Labs     06/26/21  1026 06/27/21  0644   WBC 9.5 6.1   HGB 10.3* 9.8*    196     BMP:    Recent Labs     06/26/21  1026 06/27/21  0644    138   K 5.2* 5.4*    111*   CO2 19* 18*   BUN 57* 50*   CREATININE 2.4* 2.3*   GLUCOSE 118* 81     Ca/Mg/Phos:   Recent Labs     06/26/21  1015 06/26/21  1026 06/27/21  0644   CALCIUM  --  8.7 8.2*   MG 2.40  --   --      Hepatic:   Recent Labs     06/26/21  1026   AST 16   ALT 12   BILITOT <0.2   ALKPHOS 74     Troponin:   Recent Labs     06/26/21  1026 06/26/21  1605 06/26/21  1927   TROPONINI <0.01 <0.01 <0.01     BNP: No results for input(s): BNP in the last 72 hours. Lipids: No results for input(s): CHOL, TRIG, HDL, LDLCALC, LABVLDL in the last 72 hours. ABGs:   Recent Labs     06/26/21  1517   PHART 7.327*   PO2ART 63.9*   [id number] 33.5*     INR: No results for input(s): INR in the last 72 hours.   UA:  Recent Labs     06/26/21  1217 06/26/21  1217 06/26/21  1950   COLORU Yellow  -- --    CLARITYU Clear  --   --    GLUCOSEU Negative  --   --    BILIRUBINUR Negative  --   --    KETUA Negative  --   --    SPECGRAV 1.025  --   --    BLOODU LARGE*  --   --    PHUR 6.0   < > 6.0   PROTEINU 100*  --   --    UROBILINOGEN 0.2  --   --    NITRU Negative  --   --    LEUKOCYTESUR TRACE*  --   --    LABMICR YES  --   --    URINETYPE NotGiven  --   --     < > = values in this interval not displayed. Urine Microscopic:   Recent Labs     06/26/21  1217   BACTERIA 2+*   WBCUA 3-5   RBCUA 21-50*   EPIU 2-5     Urine Culture: No results for input(s): LABURIN in the last 72 hours. Urine Chemistry:   Recent Labs     06/27/21  0244   PROTEINUR 59.10*             IMAGING:  CT Cervical Spine WO Contrast   Final Result      No evidence of acute cervical spine injury. 6 mm nodular opacity in the right upper lobe. CT Head WO Contrast   Final Result      Atrophy and white matter ischemic changes noted diffusely. No acute hemorrhage or mass effect identified. Focal hypodensity seen in left thalamus, likely related to prior infarct. XR CHEST PORTABLE   Final Result      Question of mild perihilar congestive change. Assessment/Plan   1. CHAPO on CKD 3     2. HTN    3. Anemia    4. Acid- base/ Electrolyte imbalance     5.  Fall     Plan  - CHAPO sec to Hypotension   - Cr better   - off IVF   - replace Bicarb   - Low K diet   - BNP - 4K   - Ur studies - 1/2 gm  - labs in am     Recommend to dose adjust all medications  based on renal functions  Maintain SBP> 90 mmHg   Daily weights   AVOID NSAIDs  Avoid Nephrotoxins  Monitor Intake/Output  Call if significant decrease in urine output                 Thank you for allowing us to participate in care of Tod Gibson MD  Feel free to contact me   [facility] [address]  Office : [phone]  Fax :[phone]

## 2021-06-27 NOTE — PLAN OF CARE
Problem: Falls - Risk of:  Goal: Will remain free from falls  Description: Will remain free from falls  Outcome: Met This Shift   Pt free from injury this shift and free of falls. 2/4 rails up on bed and bed is in the lowest position. Wheels locked and bed alarm set. Socks on pt and ID bands on pt. Call light in reach of pt and pt educated to call out to get up x2 walker GB. Will continue to monitor for safety.     Problem: Skin Integrity:  Goal: Skin integrity will stabilize  Description: Skin integrity will stabilize  Outcome: Met This Shift   No new breakdown this shift

## 2021-06-27 NOTE — PLAN OF CARE
Problem: Falls - Risk of:  Goal: Will remain free from falls  Description: Will remain free from falls  Outcome: Ongoing    Fall risk precaution in place. Bed is locked and in lowest position. 2/4 side rails are up. Call light with in reach. Fall risk bracelet in place, non slip socks on.frequent check on patient. free from falls at this time. will continue to monitor.

## 2021-06-28 VITALS
RESPIRATION RATE: 16 BRPM | BODY MASS INDEX: 24.55 KG/M2 | HEART RATE: 64 BPM | DIASTOLIC BLOOD PRESSURE: 51 MMHG | WEIGHT: 130 LBS | TEMPERATURE: 98 F | OXYGEN SATURATION: 97 % | HEIGHT: 61 IN | SYSTOLIC BLOOD PRESSURE: 135 MMHG

## 2021-06-28 LAB
ANION GAP SERPL CALCULATED.3IONS-SCNC: 10 MMOL/L (ref 3–16)
BASOPHILS ABSOLUTE: 0 K/UL (ref 0–0.2)
BASOPHILS RELATIVE PERCENT: 0.4 %
BUN BLDV-MCNC: 42 MG/DL (ref 7–20)
CALCIUM SERPL-MCNC: 8.4 MG/DL (ref 8.3–10.6)
CHLORIDE BLD-SCNC: 109 MMOL/L (ref 99–110)
CO2: 18 MMOL/L (ref 21–32)
CREAT SERPL-MCNC: 1.8 MG/DL (ref 0.6–1.2)
EKG ATRIAL RATE: 68 BPM
EKG DIAGNOSIS: NORMAL
EKG P AXIS: 52 DEGREES
EKG P-R INTERVAL: 134 MS
EKG Q-T INTERVAL: 420 MS
EKG QRS DURATION: 92 MS
EKG QTC CALCULATION (BAZETT): 446 MS
EKG R AXIS: -17 DEGREES
EKG T AXIS: 36 DEGREES
EKG VENTRICULAR RATE: 68 BPM
EOSINOPHILS ABSOLUTE: 0.2 K/UL (ref 0–0.6)
EOSINOPHILS RELATIVE PERCENT: 4.6 %
GFR AFRICAN AMERICAN: 33
GFR NON-AFRICAN AMERICAN: 27
GLUCOSE BLD-MCNC: 80 MG/DL (ref 70–99)
HCT VFR BLD CALC: 32 % (ref 36–48)
HEMOGLOBIN: 10.5 G/DL (ref 12–16)
LV EF: 58 %
LVEF MODALITY: NORMAL
LYMPHOCYTES ABSOLUTE: 1 K/UL (ref 1–5.1)
LYMPHOCYTES RELATIVE PERCENT: 20.3 %
MCH RBC QN AUTO: 30.1 PG (ref 26–34)
MCHC RBC AUTO-ENTMCNC: 32.9 G/DL (ref 31–36)
MCV RBC AUTO: 91.4 FL (ref 80–100)
MONOCYTES ABSOLUTE: 0.6 K/UL (ref 0–1.3)
MONOCYTES RELATIVE PERCENT: 11 %
NEUTROPHILS ABSOLUTE: 3.3 K/UL (ref 1.7–7.7)
NEUTROPHILS RELATIVE PERCENT: 63.7 %
PDW BLD-RTO: 15.3 % (ref 12.4–15.4)
PLATELET # BLD: 220 K/UL (ref 135–450)
PMV BLD AUTO: 8 FL (ref 5–10.5)
POTASSIUM REFLEX MAGNESIUM: 4.9 MMOL/L (ref 3.5–5.1)
PRO-BNP: 4828 PG/ML (ref 0–449)
RBC # BLD: 3.5 M/UL (ref 4–5.2)
SODIUM BLD-SCNC: 137 MMOL/L (ref 136–145)
VITAMIN D 25-HYDROXY: 23.4 NG/ML
WBC # BLD: 5.1 K/UL (ref 4–11)

## 2021-06-28 PROCEDURE — 97530 THERAPEUTIC ACTIVITIES: CPT

## 2021-06-28 PROCEDURE — 93306 TTE W/DOPPLER COMPLETE: CPT

## 2021-06-28 PROCEDURE — 6360000002 HC RX W HCPCS: Performed by: STUDENT IN AN ORGANIZED HEALTH CARE EDUCATION/TRAINING PROGRAM

## 2021-06-28 PROCEDURE — 6370000000 HC RX 637 (ALT 250 FOR IP): Performed by: STUDENT IN AN ORGANIZED HEALTH CARE EDUCATION/TRAINING PROGRAM

## 2021-06-28 PROCEDURE — 80048 BASIC METABOLIC PNL TOTAL CA: CPT

## 2021-06-28 PROCEDURE — 36415 COLL VENOUS BLD VENIPUNCTURE: CPT

## 2021-06-28 PROCEDURE — 99233 SBSQ HOSP IP/OBS HIGH 50: CPT | Performed by: INTERNAL MEDICINE

## 2021-06-28 PROCEDURE — 97166 OT EVAL MOD COMPLEX 45 MIN: CPT

## 2021-06-28 PROCEDURE — 6370000000 HC RX 637 (ALT 250 FOR IP): Performed by: INTERNAL MEDICINE

## 2021-06-28 PROCEDURE — 99238 HOSP IP/OBS DSCHRG MGMT 30/<: CPT | Performed by: HOSPITALIST

## 2021-06-28 PROCEDURE — 82306 VITAMIN D 25 HYDROXY: CPT

## 2021-06-28 PROCEDURE — 93010 ELECTROCARDIOGRAM REPORT: CPT | Performed by: INTERNAL MEDICINE

## 2021-06-28 PROCEDURE — 97535 SELF CARE MNGMENT TRAINING: CPT

## 2021-06-28 PROCEDURE — 97162 PT EVAL MOD COMPLEX 30 MIN: CPT

## 2021-06-28 PROCEDURE — 85025 COMPLETE CBC W/AUTO DIFF WBC: CPT

## 2021-06-28 PROCEDURE — 97116 GAIT TRAINING THERAPY: CPT

## 2021-06-28 PROCEDURE — 83880 ASSAY OF NATRIURETIC PEPTIDE: CPT

## 2021-06-28 RX ORDER — CEFDINIR 300 MG/1
300 CAPSULE ORAL DAILY
Status: DISCONTINUED | OUTPATIENT
Start: 2021-06-28 | End: 2021-06-28 | Stop reason: HOSPADM

## 2021-06-28 RX ORDER — CEFDINIR 300 MG/1
300 CAPSULE ORAL DAILY
Qty: 3 CAPSULE | Refills: 0 | Status: SHIPPED | OUTPATIENT
Start: 2021-06-28 | End: 2021-07-01

## 2021-06-28 RX ORDER — SODIUM BICARBONATE 650 MG/1
650 TABLET ORAL 2 TIMES DAILY
Qty: 60 TABLET | Refills: 0 | Status: SHIPPED | OUTPATIENT
Start: 2021-06-28 | End: 2021-07-12

## 2021-06-28 RX ORDER — ATENOLOL 25 MG/1
TABLET ORAL
Qty: 30 TABLET | Refills: 2 | Status: SHIPPED | OUTPATIENT
Start: 2021-06-28 | End: 2021-09-15 | Stop reason: SDUPTHER

## 2021-06-28 RX ADMIN — HEPARIN SODIUM 5000 UNITS: 5000 INJECTION INTRAVENOUS; SUBCUTANEOUS at 04:20

## 2021-06-28 RX ADMIN — SODIUM BICARBONATE 650 MG: 650 TABLET ORAL at 09:19

## 2021-06-28 RX ADMIN — OXYCODONE AND ACETAMINOPHEN 1 TABLET: 7.5; 325 TABLET ORAL at 03:19

## 2021-06-28 RX ADMIN — HEPARIN SODIUM 5000 UNITS: 5000 INJECTION INTRAVENOUS; SUBCUTANEOUS at 11:44

## 2021-06-28 RX ADMIN — VENLAFAXINE HYDROCHLORIDE 75 MG: 75 CAPSULE, EXTENDED RELEASE ORAL at 09:19

## 2021-06-28 RX ADMIN — CEFDINIR 300 MG: 300 CAPSULE ORAL at 11:44

## 2021-06-28 ASSESSMENT — PAIN DESCRIPTION - ONSET: ONSET: ON-GOING

## 2021-06-28 ASSESSMENT — PAIN DESCRIPTION - LOCATION: LOCATION: BACK;SHOULDER

## 2021-06-28 ASSESSMENT — PAIN DESCRIPTION - FREQUENCY: FREQUENCY: CONTINUOUS

## 2021-06-28 ASSESSMENT — PAIN DESCRIPTION - PROGRESSION: CLINICAL_PROGRESSION: NOT CHANGED

## 2021-06-28 ASSESSMENT — PAIN - FUNCTIONAL ASSESSMENT: PAIN_FUNCTIONAL_ASSESSMENT: ACTIVITIES ARE NOT PREVENTED

## 2021-06-28 ASSESSMENT — PAIN DESCRIPTION - ORIENTATION: ORIENTATION: LEFT

## 2021-06-28 ASSESSMENT — PAIN DESCRIPTION - DESCRIPTORS: DESCRIPTORS: ACHING

## 2021-06-28 ASSESSMENT — PAIN SCALES - GENERAL: PAINLEVEL_OUTOF10: 7

## 2021-06-28 ASSESSMENT — PAIN DESCRIPTION - PAIN TYPE: TYPE: CHRONIC PAIN

## 2021-06-28 NOTE — PROGRESS NOTES
With: Friend(s) Serafin Appiah)  Type of Home: House  Home Layout: Two level, Able to Live on Main level with bedroom/bathroom (laundry on main floor)  Home Access: Stairs to enter without rails (1 step)  Bathroom Shower/Tub: Tub/Shower unit  Bathroom Toilet: Standard  Bathroom Equipment: Tub transfer bench, Grab bars around toilet, Grab bars in shower, Hand-held shower  Home Equipment: Cane, 4 wheeled walker  ADL Assistance: [address]: Independent (shares with Pat)  Ambulation Assistance: Independent (takes cane outside of house)  Transfer Assistance: Independent  Active : Yes  Occupation: Retired ()  Leisure & Hobbies: watch movies  Additional Comments: Pt denies recent falls, other than the ones that happened say of admission. Objective        Orientation  Overall Orientation Status: Within Functional Limits     Balance  Sitting Balance: Independent  Standing Balance: Stand by assistance  Standing Balance  Time: 1 min + 10 min  Activity: functional mobility in room, bathroom, smith; ADLs  Comment: spvn static stance, SBA during dynamic tasks (such as dressing)  Functional Mobility  Functional - Mobility Device: Rolling Walker  Assist Level: Stand by assistance  Functional Mobility Comments: CGA initially without walker, improved to SBA with rolling walker. Agreeable to using at home initially.   Toilet Transfers  Toilet - Technique: Ambulating  Equipment Used: Standard toilet  Toilet Transfer: Stand by assistance  Toilet Transfers Comments: with grab bar  ADL  Feeding: Independent  Grooming: Independent (standing at sink with spvn)  LE Dressing: Stand by assistance (new brief)  Toileting: Supervision  Additional Comments: min cues to doff brief seated instead of standing - verb understanding     Transfers  Sit to stand: Stand by assistance  Stand to sit: Stand by assistance     Cognition  Overall Cognitive Status: WFL  Perception  Overall Perceptual Status: WFL (leans to R in chair; reports this is normal for her)           Plan  - D/C OT services         AM-PAC Score  AM-PAC Inpatient Daily Activity Raw Score: 22 (06/28/21 1025)  AM-PAC Inpatient ADL T-Scale Score : 47.1 (06/28/21 1025)  ADL Inpatient CMS 0-100% Score: 25.8 (06/28/21 1025)  ADL Inpatient CMS G-Code Modifier : CJ (06/28/21 1025)      Therapy Time   Individual Concurrent Group Co-treatment   Time In 0810         Time Out 0849         Minutes 39          Timed Code Tx Min: 24   Total Tx Time: [address] [facility],

## 2021-06-28 NOTE — PROGRESS NOTES
VSS. PIV SL. Voids WNL to pure wick while in bed. Last BM yesterday. Pt tolerates diet and denies n/v. I/O last 3 completed shifts: In: 1101 [P.O.:250; I.V.:851]  Out: 1560 [Urine:1560]  I/O this shift: In: 76 [P.O.:75]  Out: 200 [Urine:200]    Bed alarm set. Call light in reach. Will continue to monitor.

## 2021-06-28 NOTE — PLAN OF CARE
Problem: Falls - Risk of:  Goal: Will remain free from falls  Description: Will remain free from falls  6/28/2021 1042 by Charlee Brooks RN  Outcome: Ongoing     Problem: Daily Care:  Goal: Daily care needs are met  Description: Daily care needs are met  Outcome: Ongoing     Problem: Pain:  Goal: Patient's pain/discomfort is manageable  Description: Patient's pain/discomfort is manageable  Outcome: Ongoing     Problem: Skin Integrity:  Goal: Skin integrity will stabilize  Description: Skin integrity will stabilize  Outcome: Ongoing     Problem: Discharge Planning:  Goal: Patients continuum of care needs are met  Description: Patients continuum of care needs are met  Outcome: Ongoing

## 2021-06-28 NOTE — PROGRESS NOTES
Physical Therapy    Facility/Department: [facility] 5T ORTHO/NEURO  Initial Assessment and Discharge    NAME: Alena Franklin  : 1940  MRN: [id number]    Date of Service: 2021    Discharge Recommendations:  Alena Franklin scored a 20/24 on the AM-PAC short mobility form. At this time, no further PT is recommended upon discharge. Recommend patient returns to prior setting with prior services. PT Equipment Recommendations  Equipment Needed: No    Assessment   Assessment: Pt from home with falls due to UTI. Pt doing well from PT standpoint. Pt needing CGA initially for transfers/gait, but quickly progressed to consistent SBA. Gait is steady using rolling walker. Recommend pt use walker initially at home until back to baseline. Pt in agreement. No further PT needs identified. Pt has support from friend at home and should progress well with continued activity from RN staff. Will sign off. Decision Making: Medium Complexity  Patient Education: Role of PT and ambulation promotion with RN staff. Pt verbalized understanding. REQUIRES PT FOLLOW UP: No         Restrictions  Up with assist     Vision/Hearing  Vision: Within Functional Limits (wears glasses)  Hearing: Within functional limits       Subjective  Chart Reviewed: Yes  Additional Pertinent Hx: Pt to ED  with 4 falls at home. CXR: congestive changes. Head CT (-) acute. Cspine CT (-) fx. PMH:  CVA, HTN, Anxiety  Diagnosis: Falls    Subjective  Subjective: Pt found supine in bed. Pt pleasant and agreeable for PT. Pt hoping to go home.   Pain Screening  Patient Currently in Pain: Yes (chronic low back pain, RN is aware.)    Orientation  Within Functional Limits    Social/Functional History  Lives With: Friend(s) Fifi Main  Type of Home: House  Home Layout: Two level, Able to Live on Main level with bedroom/bathroom (laundry on main floor)  Home Access: Stairs to enter without rails (1 step)  Bathroom Shower/Tub: Tub/Shower unit  Bathroom Toilet: Standard  Bathroom Equipment: Tub transfer bench, Grab bars around toilet, Grab bars in shower, Hand-held shower  Home Equipment: Cane, 4 wheeled walker  ADL Assistance: [address]: Independent (shares with Pat)  Ambulation Assistance: Independent (takes cane outside of house)  Transfer Assistance: Independent  Active : Yes  Occupation: Retired ()  Leisure & Hobbies: watch movies  Additional Comments: Pt denies recent falls, other than the ones that happened say of admission.       Objective  Strength  Strength RLE: WFL  Strength LLE: WFL    Bed mobility  Supine to Sit: Supervision (HOB raised)     Transfers  Sit to Stand: Contact guard assistance (progressing to SBA)  Stand to sit: Stand by assistance     Ambulation  Device: Rolling Walker  Assistance: Stand by assistance (CGA initailly)  Quality of Gait: Steady gait with walker  Gait Deviations: Slow Meryl;Decreased step length  Distance: 10ft + 200ft    Balance  Sitting - Static: Good  Sitting - Dynamic: Good  Standing - Static: Good  Standing - Dynamic: Good (using rolling walker)    Treatment included gait and transfer training with patient education     Plan: Discharge acute PT      Safety Devices  Type of devices: Left in chair, Chair alarm in place, Call light within reach, Nurse notified      AM-PAC Score  AM-PAC Inpatient Mobility Raw Score : 20 (06/28/21 0959)  AM-PAC Inpatient T-Scale Score : 47.67 (06/28/21 0959)  Mobility Inpatient CMS 0-100% Score: 35.83 (06/28/21 0959)  Mobility Inpatient CMS G-Code Modifier : CJ (06/28/21 7102)      Therapy Time   Individual Concurrent Group Co-treatment   Time In 0810         Time Out 0850         Minutes 40         Timed Code Treatment Minutes:  25  Total Treatment Minutes:  40      Carolyne Arriaga PT

## 2021-06-28 NOTE — PROGRESS NOTES
Patient A&Ox4, VSS, SB on TELE. Neuro checks unchanged. Patient denies pain or nausea, tolerating diet well. Patient ambulating in room and hallways with walker and gait belt, tolerates well. Patient anticipating discharge home pending ECHO. Will continue to monitor.

## 2021-06-28 NOTE — DISCHARGE SUMMARY
INTERNAL MEDICINE DEPARTMENT AT [address] [place] [address]  DISCHARGE SUMMARY    Patient ID: Dorys Jenkins                                             Discharge Date: 6/28/2021   Patient's PCP: Reji Mejia MD                                          Discharge Physician: Vamshi Voss MD MD  Admit Date: 6/26/2021   Admitting Physician: Carrillo Ace MD    DISCHARGE DIAGNOSES:  Hospital Problems         Last Modified POA    Acute renal failure superimposed on stage 3a chronic kidney disease ([facility] [address] 6/27/2021 Yes    Fall at home, sequela 6/26/2021 Yes    Acidosis 6/27/2021 Yes    Hyperkalemia 6/27/2021 Yes    Scalp laceration 6/27/2021 Yes    Acute encephalopathy 6/27/2021 Yes    Acute UTI 6/27/2021 Yes    Hypotension due to hypovolemia 6/27/2021 Yes    Falls frequently 6/27/2021 Yes    CHAPO (acute kidney injury) ([facility] [address] 6/27/2021 Yes        Hospital Course:      Dorys Jenkins is a 80 y.o. female PMH anxiety, CVA p/w multiple falls. Admitted for metabolic encephalopathy. UA on admission was concerning for UTI, pt was started on rocephin, will discharge on cefdinir for a total of 5 days of abx. Remainder of the work up was negative. CT head without abnls. Pt also got Echo this admission which showed EF of 55-60%, no wall motion abnls or valve abnls. Nephrology was consulted for CHAPO and acidosis, pt will be discharge on sodium bicarbonate 650 BID. Pt is to get repeat labwork within 1 week of discharge and follow up nephrology as OP. Pt was seen at bedside today AM, resting comfortably. Does not have any new complaints. Ambulating in the room, feels steady on her feet. Endorses appetite. Having normal BM and is voiding as usual.  Denies F/C, N/V, CP, SOB, HA, vision changes, weakness in arms and feet.      Physical Exam:  BP (!) 135/51   Pulse 64   Temp 98 °F (36.7 °C) (Oral)   Resp 16   Ht 5' 1\" (1.549 m)   Wt 130 lb (59 kg)   SpO2 97%   BMI 24.56 kg/m²   Physical Exam  Constitutional:       General: She is not in acute distress. Appearance: Normal appearance. She is normal weight. She is not ill-appearing, toxic-appearing or diaphoretic. HENT:      Mouth/Throat:      Mouth: Mucous membranes are moist.   Eyes:      Pupils: Pupils are equal, round, and reactive to light. Cardiovascular:      Rate and Rhythm: Normal rate and regular rhythm. Pulses: Normal pulses. Heart sounds: Normal heart sounds. No murmur heard. Pulmonary:      Effort: Pulmonary effort is normal. No respiratory distress. Breath sounds: Normal breath sounds. No wheezing. Abdominal:      General: Abdomen is flat. Bowel sounds are normal. There is no distension. Palpations: Abdomen is soft. Tenderness: There is no abdominal tenderness. Musculoskeletal:         General: No swelling. Skin:     General: Skin is warm. Neurological:      Mental Status: She is alert and oriented to person, place, and time. Mental status is at baseline.        Consults:  IP CONSULT TO PRIMARY CARE PROVIDER    Disposition: home  Discharged Condition: Stable  Follow Up: Primary Care Physician in one week    Nephrology in 2 weeks    DISCHARGE MEDICATION:     Medication List      START taking these medications    cefdinir 300 MG capsule  Commonly known as: OMNICEF  Take 1 capsule by mouth daily for 3 days  Notes to patient: Cefdinir (Omnicef)  USE--treat bacterial infection  SIDE EFFECTS-- upset stomach, diarrhea     sodium bicarbonate 650 MG tablet  Take 1 tablet by mouth 2 times daily        CONTINUE taking these medications    atenolol 25 MG tablet  Commonly known as: TENORMIN  TAKE ONE TABLET BY MOUTH DAILY     hydrOXYzine 50 MG tablet  Commonly known as: ATARAX  Take 1 tablet by mouth 3 times daily as needed for Anxiety     mirtazapine 7.5 MG tablet  Commonly known as: REMERON  Take 1 tablet by mouth nightly     oxyCODONE-acetaminophen 7.5-325 MG per tablet  Commonly known as: PERCOCET     venlafaxine 75 MG extended release capsule  Commonly known as: EFFEXOR XR  TAKE ONE CAPSULE BY MOUTH DAILY        STOP taking these medications    gabapentin 300 MG capsule  Commonly known as: NEURONTIN     sulfamethoxazole-trimethoprim 800-160 MG per tablet  Commonly known as: BACTRIM DS;SEPTRA DS           Where to Get Your Medications      These medications were sent to [facility], [place] [facility] [address] [facility] [phone] [phone]  [facility]    Phone: [phone]   · atenolol 25 MG tablet  · cefdinir 300 MG capsule  · sodium bicarbonate 650 MG tablet       Activity: activity as tolerated  Diet: cardiac diet  Wound Care: as directed    Time Spent on discharge is more than 45 minutes    Signed:  Dominic Gonzalez MD,  MD   6/28/2021    Addendum to Resident H& P/Progress note:  I have personally seen,examined and evaluated the patient.  I have reviewed the current history, physical findings, labs and assessment and plan and agree with note as documented by resident MD ( Cherylene Lips, MD, Diego Pearson

## 2021-06-28 NOTE — PROGRESS NOTES
Nephrology Consult Note                                                                                                                                                                                                                                                                                                                                                               Office : [phone]     Fax :[phone]              Patient's Name: Jax Dominguez    6/27/2021    Reason for Consult: CHAPO   Requesting Physician:  Micky Welch MD      Cr better   Good UO   K elevated       Past Medical History:   Diagnosis Date    Anxiety     Colon polyps     check q5yrs    CVA (cerebral vascular accident) ([address] 10/12/2018    Lacunar, left hemisphere    HTN (hypertension) 10/11    Hyperlipidemia     Kidney stones     Proteinuria 10/11    Screening mammogram 2010    benign       Past Surgical History:   Procedure Laterality Date    COLONOSCOPY  2010    Dr. Eleanor Day - recheck 5 yrs    CYSTOSCOPY      CYSTOSCOPY Right 4/21/2021    CYSTOSCOPY, RIGHT URETEROSCOPY, WITH RETROGRADE, RIGHT URETERAL BIOPSY,  RIGHT STENT EXCHANGE performed by Sherlyn Pink MD at [facility]  5/8/2015    sphincter and stent    EYE SURGERY Bilateral     cataract removal    MOUTH SURGERY      WRIST FRACTURE SURGERY Left 12/23/2016    OPEN REDUCTION INTERNAL FIXATION LEFT DISTAL RADIUS FRACTURE       Family History   Problem Relation Age of Onset    Diabetes Mother     Heart Disease Father     Cancer Brother     Kidney Disease Brother         reports that she has been smoking cigarettes. She started smoking about 63 years ago. She has a 50.00 pack-year smoking history. She has never used smokeless tobacco. She reports that she does not drink alcohol and does not use drugs.     Allergies:  Penicillins    Current Medications:    sodium bicarbonate tablet 650 mg, BID  venlafaxine (EFFEXOR XR) extended release capsule 75 mg, Daily with breakfast  heparin (porcine) injection 5,000 Units, Q8H  acetaminophen (TYLENOL) tablet 650 mg, Once  atenolol (TENORMIN) tablet 25 mg, Daily  [Held by provider] gabapentin (NEURONTIN) capsule 300 mg, BID PRN  mirtazapine (REMERON) tablet 7.5 mg, Nightly  acetaminophen (TYLENOL) tablet 650 mg, Q4H PRN  bisacodyl (DULCOLAX) EC tablet 5 mg, Daily PRN  perflutren lipid microspheres (DEFINITY) injection 1.65 mg, ONCE PRN  oxyCODONE-acetaminophen (PERCOCET) 7.5-325 MG per tablet 1 tablet, Q6H PRN  cefTRIAXone (ROCEPHIN) 1000 mg IVPB in 50 mL D5W minibag, Q24H        Review of Systems:   14 point ROS obtained but were negative except mentioned in HPI      Physical exam:     Vitals:  /62   Pulse 64   Temp 97.5 °F (36.4 °C) (Oral)   Resp 16   Ht 5' 1\" (1.549 m)   Wt 130 lb (59 kg)   SpO2 95%   BMI 24.56 kg/m²   Constitutional:  OAA X3 NAD  Skin: no rash, turgor wnl  Heent:  eomi, mmm  Neck: no bruits or jvd noted  Cardiovascular:  S1, S2 without m/r/g  Respiratory: CTA B without w/r/r  Abdomen:  +bs, soft, nt, nd  Ext: no lower extremity edema  Psychiatric: mood and affect appropriate  Musculoskeletal:  Rom, muscular strength intact    Data:   Labs:  CBC:   Recent Labs     06/26/21  1026 06/27/21  0644   WBC 9.5 6.1   HGB 10.3* 9.8*    196     BMP:    Recent Labs     06/26/21  1026 06/27/21  0644 06/27/21  1623    138  --    K 5.2* 5.4* 5.2*    111*  --    CO2 19* 18*  --    BUN 57* 50*  --    CREATININE 2.4* 2.3*  --    GLUCOSE 118* 81  --      Ca/Mg/Phos:   Recent Labs     06/26/21  1015 06/26/21  1026 06/27/21  0644 06/27/21  1623   CALCIUM  --  8.7 8.2*  --    MG 2.40  --   --  2.30     Hepatic:   Recent Labs     06/26/21  1026   AST 16   ALT 12   BILITOT <0.2   ALKPHOS 74     Troponin:   Recent Labs     06/26/21  1026 06/26/21  1605 06/26/21  1927   TROPONINI <0.01 <0.01 <0.01     BNP: No results for input(s): BNP in the last 72 hours.   Lipids: No results for input(s): CHOL, TRIG, HDL, LDLCALC, LABVLDL in the last 72 hours. ABGs:   Recent Labs     06/26/21  1517   PHART 7.327*   PO2ART 63.9*   CMU9VMB 33.5*     INR: No results for input(s): INR in the last 72 hours. UA:  Recent Labs     06/26/21  1217 06/26/21  1217 06/26/21  1950   COLORU Yellow  --   --    CLARITYU Clear  --   --    Mara Civil Negative  --   --    BILIRUBINUR Negative  --   --    Ray Sicard Negative  --   --    SPECGRAV 1.025  --   --    BLOODU LARGE*  --   --    PHUR 6.0   < > 6.0   PROTEINU 100*  --   --    UROBILINOGEN 0.2  --   --    NITRU Negative  --   --    LEUKOCYTESUR TRACE*  --   --    LABMICR YES  --   --    URINETYPE NotGiven  --   --     < > = values in this interval not displayed. Urine Microscopic:   Recent Labs     06/26/21  1217   BACTERIA 2+*   WBCUA 3-5   RBCUA 21-50*   EPIU 2-5     Urine Culture:   Recent Labs     06/26/21  1300   LABURIN No growth at 18 to 36 hours     Urine Chemistry:   Recent Labs     06/27/21  0244   LABCREA 84.9   PROTEINUR 59.10*             IMAGING:  CT Cervical Spine WO Contrast   Final Result      No evidence of acute cervical spine injury. 6 mm nodular opacity in the right upper lobe. CT Head WO Contrast   Final Result      Atrophy and white matter ischemic changes noted diffusely. No acute hemorrhage or mass effect identified. Focal hypodensity seen in left thalamus, likely related to prior infarct. XR CHEST PORTABLE   Final Result      Question of mild perihilar congestive change. Assessment/Plan   1. CHAPO on CKD 3     2. HTN    3. Anemia    4. Acid- base/ Electrolyte imbalance     5.  UTI     Plan   - Low K diet   - D/c IVF   - No diuretics   - Ur studies   -    - labs in am     Recommend to dose adjust all medications  based on renal functions  Maintain SBP> 90 mmHg   Daily weights   AVOID NSAIDs  Avoid Nephrotoxins  Monitor Intake/Output  Call if significant decrease in urine output                 Thank you for allowing us to participate in care of Jorge Marshall MD  Feel free to contact me   [facility] [address]  Office : [phone]  Fax :[phone]

## 2021-06-28 NOTE — PLAN OF CARE
Problem: Falls - Risk of:  Goal: Will remain free from falls  Description: Will remain free from falls  Outcome: Met This Shift   Pt free from injury this shift and free of falls. 2/4 rails up on bed and bed is in the lowest position. Wheels locked and bed alarm set. Socks on pt and ID bands on pt. Call light in reach of pt and pt educated to call out to get up x1 walker GB. Will continue to monitor for safety. Problem: Infection:  Goal: Will remain free from infection  Description: Will remain free from infection  Outcome: Ongoing   Afebrile. Pt receives ABX per orders. No burning, urgency or frequency noted by pt overnight.

## 2021-06-28 NOTE — PROGRESS NOTES
Patient discharged with belongings and follow up instructions, new medications called into local pharmacy. Patient transported to main entrance via wheelchair, friend awaiting at [facility].

## 2021-06-29 ENCOUNTER — CARE COORDINATION (OUTPATIENT)
Dept: CASE MANAGEMENT | Age: 81
End: 2021-06-29

## 2021-06-29 DIAGNOSIS — N18.9 CHRONIC KIDNEY DISEASE, UNSPECIFIED CKD STAGE: Primary | ICD-10-CM

## 2021-06-29 PROCEDURE — 1111F DSCHRG MED/CURRENT MED MERGE: CPT | Performed by: INTERNAL MEDICINE

## 2021-06-29 NOTE — CARE COORDINATION
Transitions of Care Initial Call    Was this an external facility discharge? No    Challenges to be reviewed by the provider   Additional needs identified to be addressed with provider: No  medications-PO ABX's              Method of communication with provider : phone      Advance Care Planning:   Does patient have an Advance Directive:  reviewed and current. Was this a readmission? No  Patients top risk factors for readmission: functional physical ability, falls and medical condition-Stroke, CKD    Care Transition Nurse (CTN) contacted the patient by telephone to perform post hospital discharge assessment. Verified name and  with patient as identifiers. Provided introduction to self, and explanation of the CTN role. CTN reviewed discharge instructions, medical action plan and red flags with patient who verbalized understanding. Patient given an opportunity to ask questions and does not have any further questions or concerns at this time. Were discharge instructions available to patient? Yes. Reviewed appropriate site of care based on symptoms and resources available to patient including: PCP, Urgent care clinics and When to call 911. The patient agrees to contact the PCP office for questions related to their healthcare. Medication reconciliation was performed with patient, who verbalizes understanding of administration of home medications. Advised obtaining a 90-day supply of all daily and as-needed medications. Covid Risk Education     Educated patient about risk for severe COVID-19 due to risk factors according to CDC guidelines. CTN reviewed discharge instructions, medical action plan and red flag symptoms with the patient who verbalized understanding. Discussed COVID vaccination status: No. Education provided on COVID-19 vaccination as appropriate. Discussed exposure protocols and quarantine with CDC Guidelines.  Patient was given an opportunity to verbalize any questions and concerns and

## 2021-06-30 RX ORDER — ERGOCALCIFEROL 1.25 MG/1
50000 CAPSULE ORAL WEEKLY
Qty: 4 CAPSULE | Refills: 0 | Status: SHIPPED | OUTPATIENT
Start: 2021-06-30 | End: 2021-11-22

## 2021-07-07 ENCOUNTER — OFFICE VISIT (OUTPATIENT)
Dept: INTERNAL MEDICINE CLINIC | Age: 81
End: 2021-07-07
Payer: MEDICARE

## 2021-07-07 VITALS
HEIGHT: 61 IN | OXYGEN SATURATION: 97 % | HEART RATE: 86 BPM | WEIGHT: 125.2 LBS | TEMPERATURE: 97 F | DIASTOLIC BLOOD PRESSURE: 80 MMHG | SYSTOLIC BLOOD PRESSURE: 140 MMHG | BODY MASS INDEX: 23.64 KG/M2

## 2021-07-07 DIAGNOSIS — Z01.818 PRE-OP EXAM: Primary | ICD-10-CM

## 2021-07-07 DIAGNOSIS — C66.1 UROTHELIAL CARCINOMA OF RIGHT DISTAL URETER (HCC): ICD-10-CM

## 2021-07-07 DIAGNOSIS — E86.1 HYPOTENSION DUE TO HYPOVOLEMIA: ICD-10-CM

## 2021-07-07 DIAGNOSIS — I10 ESSENTIAL HYPERTENSION: ICD-10-CM

## 2021-07-07 DIAGNOSIS — I95.89 HYPOTENSION DUE TO HYPOVOLEMIA: ICD-10-CM

## 2021-07-07 DIAGNOSIS — N17.9 AKI (ACUTE KIDNEY INJURY) (HCC): ICD-10-CM

## 2021-07-07 PROCEDURE — 4040F PNEUMOC VAC/ADMIN/RCVD: CPT | Performed by: INTERNAL MEDICINE

## 2021-07-07 PROCEDURE — 1111F DSCHRG MED/CURRENT MED MERGE: CPT | Performed by: INTERNAL MEDICINE

## 2021-07-07 PROCEDURE — G8399 PT W/DXA RESULTS DOCUMENT: HCPCS | Performed by: INTERNAL MEDICINE

## 2021-07-07 PROCEDURE — 1123F ACP DISCUSS/DSCN MKR DOCD: CPT | Performed by: INTERNAL MEDICINE

## 2021-07-07 PROCEDURE — G8427 DOCREV CUR MEDS BY ELIG CLIN: HCPCS | Performed by: INTERNAL MEDICINE

## 2021-07-07 PROCEDURE — 99214 OFFICE O/P EST MOD 30 MIN: CPT | Performed by: INTERNAL MEDICINE

## 2021-07-07 PROCEDURE — 4004F PT TOBACCO SCREEN RCVD TLK: CPT | Performed by: INTERNAL MEDICINE

## 2021-07-07 PROCEDURE — 1090F PRES/ABSN URINE INCON ASSESS: CPT | Performed by: INTERNAL MEDICINE

## 2021-07-07 PROCEDURE — G8420 CALC BMI NORM PARAMETERS: HCPCS | Performed by: INTERNAL MEDICINE

## 2021-07-07 RX ORDER — GABAPENTIN 300 MG/1
300 CAPSULE ORAL 3 TIMES DAILY
COMMUNITY
Start: 2021-02-19 | End: 2021-07-07 | Stop reason: ALTCHOICE

## 2021-07-07 NOTE — PROGRESS NOTES
Chief Complaint   Patient presents with    Pre-op Exam     Cystoscopy with right ureteroscopy and biopsy/laser resection of tumor on 7/20/21 Dr. Fariha Stock @ [facility] is a 80 y.o. female who presents for a preoperative physical examination. She is scheduled to have cystoscopy with right ureteroscopy and biopsy/resection of tumor done by Dr. Megan Nam at [facility] on 7/20/21. History of Present Illness:      Michal Saint was found to have a right ureteral tumor. Past Medical History:   Diagnosis Date    Anxiety     Colon polyps     check q5yrs    [facility]cere[facility] [address]) [address] 10/12/2018    Lacunar, left hemisphere    HTN (hypertension) 10/11    Hyperlipidemia     Kidney stones     Proteinuria 10/11    Screening mammogram 2010    benign        Review of patient's past surgical history indicates:     Past Surgical History:   Procedure Laterality Date    COLONOSCOPY  2010    Dr. Duggan Arrow - recheck 5 yrs    CYSTOSCOPY      CYSTOSCOPY Right 4/21/2021    CYSTOSCOPY, RIGHT URETEROSCOPY, WITH RETROGRADE, RIGHT URETERAL BIOPSY,  RIGHT STENT EXCHANGE performed by Janet Wilson MD at [address] [place] [address] ERCP  5/8/2015    sphincter and stent    EYE SURGERY Bilateral     cataract removal    MOUTH SURGERY      WRIST FRACTURE SURGERY Left 12/23/2016    OPEN REDUCTION INTERNAL FIXATION LEFT DISTAL RADIUS FRACTURE                                                   Current Outpatient Medications   Medication Sig Dispense Refill    vitamin D (ERGOCALCIFEROL) 1.25 MG (89034 UT) CAPS capsule Take 1 capsule by mouth once a week 4 capsule 0    atenolol (TENORMIN) 25 MG tablet TAKE ONE TABLET BY MOUTH DAILY 30 tablet 2    oxyCODONE-acetaminophen (PERCOCET) 7.5-325 MG per tablet Take 1 tablet by mouth every 6 hours as needed for Pain (chronic back pain. ). Chronic back pain.       venlafaxine (EFFEXOR XR) 75 MG extended release capsule TAKE ONE CAPSULE BY MOUTH DAILY 90 capsule 1    hydrOXYzine (ATARAX) 50 MG tablet Take 1 tablet by mouth 3 times daily as needed for Anxiety 90 tablet 3    mirtazapine (REMERON) 7.5 MG tablet Take 1 tablet by mouth nightly 30 tablet 5        Atenolol 25 mg 1 pill daily. No current facility-administered medications for this visit. Allergies   Allergen Reactions    Penicillins Swelling     Swelling in vaginal area only  Vaginal swelling -- Pt cannot remember all symptoms;       Social History     Tobacco Use    Smoking status: Current Every Day Smoker     Packs/day: 1.00     Years: 50.00     Pack years: 50.00     Types: Cigarettes     Start date: 5/1/1958    Smokeless tobacco: Never Used   Substance Use Topics    Alcohol use: No     Alcohol/week: 0.0 standard drinks        Family History   Problem Relation Age of Onset    Diabetes Mother     Heart Disease Father     Cancer Brother     Kidney Disease Brother         Review Of Systems    Skin: no abnormal pigmentation, rash, scaling, itching, masses, hair or nail changes  Eyes: negative  Ears/Nose/Throat: negative  Respiratory: negative  Cardiovascular: negative  Gastrointestinal: negative  Genitourinary: negative  Musculoskeletal: negative  Neurologic: negative  Psychiatric: negative  Hematologic/Lymphatic/Immunologic: negative  Endocrine: negative    PHYSICAL EXAMINATION:  BP (!) 140/80   Pulse 86   Temp 97 °F (36.1 °C)   Ht 5' 1\" (1.549 m)   Wt 125 lb 3.2 oz (56.8 kg)   SpO2 97%   BMI 23.66 kg/m²   General appearance - healthy, alert, no distress  Skin - Skin color, texture, turgor normal. No rashes or lesions. Head - Normocephalic. No masses, lesions, tenderness or abnormalities  Eyes - conjunctivae/corneas clear. PERRL, EOM's intact. Ears - External ears normal. Canals clear. TM's normal.  Nose/Sinuses - Nares normal. Septum midline. Mucosa normal. No drainage or sinus tenderness. Oropharynx - Lips, mucosa, and tongue normal. Teeth and gums normal. Orop  Neck - Neck supple. No adenopathy.  Thyroid symmetric, normal size,  Back - Back symmetric, no curvature. ROM normal. No CVA tenderness. Lungs - Percussion normal. Good diaphragmatic excursion. Lungs clear  Heart - Regular rate and rhythm, with no rub, murmur or gallop noted. Abdomen - Abdomen soft, non-tender. BS normal. No masses, organomegaly  Extremities - Extremities normal. No deformities, edema, or skin discolora  Musculoskeletal - Spine ROM normal. Muscular strength intact. Peripheral pulses - radial=4/4,, femoral=4/4, popliteal=4/4, dorsalis pedis=4/4,  Neuro - Gait normal. Reflexes normal and symmetric. Sensation grossly normal.  No focal weakness      ASSESSMENT and PLAN:     1. Pre-op exam  Right ureteral tumor. Asked to see patient by Dr. Libby Saenz regarding clearance for surgical procedure. 2. Urothelial carcinoma of right distal ureter ([address]  Found to have ureteral tumor. She is trying to avoid invasive surgery and have tumor resection via cystoscopy. 3. Essential hypertension  Stable hypertension. Upon awakening she should take her atenolol with a sip of water on the morning of surgery, then proceed to the hospital as directed. She is medically cleared for surgery and anesthesia.     Copy to Dr Libby Saenz

## 2021-07-08 LAB
ALBUMIN SERPL-MCNC: 4.5 G/DL (ref 3.4–5)
ANION GAP SERPL CALCULATED.3IONS-SCNC: 12 MMOL/L (ref 3–16)
BUN BLDV-MCNC: 35 MG/DL (ref 7–20)
CALCIUM SERPL-MCNC: 9.3 MG/DL (ref 8.3–10.6)
CHLORIDE BLD-SCNC: 104 MMOL/L (ref 99–110)
CO2: 22 MMOL/L (ref 21–32)
CREAT SERPL-MCNC: 1.6 MG/DL (ref 0.6–1.2)
GFR AFRICAN AMERICAN: 38
GFR NON-AFRICAN AMERICAN: 31
GLUCOSE BLD-MCNC: 110 MG/DL (ref 70–99)
PHOSPHORUS: 3.9 MG/DL (ref 2.5–4.9)
POTASSIUM SERPL-SCNC: 5.1 MMOL/L (ref 3.5–5.1)
SODIUM BLD-SCNC: 138 MMOL/L (ref 136–145)

## 2021-07-09 ENCOUNTER — CARE COORDINATION (OUTPATIENT)
Dept: CASE MANAGEMENT | Age: 81
End: 2021-07-09

## 2021-07-09 NOTE — CARE COORDINATION
[facility] Transitions Follow Up Call    2021    Patient: Sofia Peters  Patient : 1940   MRN: [id number]   Reason for Admission: Falls, CKD  Discharge Date: 21 RARS: Readmission Risk Score: 24         Spoke with: [address]  Transitions Subsequent and Final Call    Schedule Follow Up Appointment with PCP: Declined  Subsequent and Final Calls  Do you have any ongoing symptoms?: No  Have your medications changed?: No  Do you have any questions related to your medications?: No  Do you currently have any active services?: No  Do you have any needs or concerns that I can assist you with?: No  Identified Barriers: None  Care Transitions Interventions  No Identified Needs  Other Interventions:         Summary  CTN spoke with patient this afternoon for follow up CTN call. Patient states she is doing well, reporting no falls since returning home. No reports of any fever, chills, nausea, vomiting, chest pain, SOB or cough. Patient instructed to continue to monitor for any of the above s/s, reporting any that may present to MD immediately, rest herself as needed and take all medications as prescribed. No other issues or concerns, no new or changed medications. CTN provided education on s/s that require medical attention and when to seek medical attention. CTN advised Pt of use urgent care or physicians 24 hr access line if assistance is needed after hours or on the weekend. Pt denies any needs or concerns and is agreeable with additional calls.     Follow Up  Future Appointments   Date Time Provider Dimitri Clay   2021  3:00 PM Gloria Leon MD [facility]   2021  3:00 PM Gloria Leon MD [facility]       Clair Do RN

## 2021-07-12 ENCOUNTER — TELEPHONE (OUTPATIENT)
Dept: INTERNAL MEDICINE CLINIC | Age: 81
End: 2021-07-12

## 2021-07-12 NOTE — TELEPHONE ENCOUNTER
Surgery dept calling to get preop. Unsigned by Dr. Sly Gomez from 07/07. No need to fax preop, just needs signed and they will pull from Epic.

## 2021-07-12 NOTE — PROGRESS NOTES
If no one is available at the desk, proceed into the [facility] Waiting Room and go through the door directly into the [facility]. There is a Check-in desk ACROSS from Room 5 (marked with a sign hanging from the ceiling). The phone number for the surgery center is [phone]. 4. Please call [phone] option #2 option #2 if you have not been preregistered yet. On the day of your procedure bring your insurance card and photo ID. You will be registered at your bedside once brought back to your room. 5. DO NOT EAT ANYTHING eight hours prior to your arrival for surgery. May have 8 ounces of water 4 hours prior to your arrival for surgery. NOTE: ALL Gastric, Bariatric and Bowel surgery patients MUST follow their surgeon's instructions. 6. MEDICATIONS    Take the following medications with a SMALL sip of water: Atenolol   Bariatric patient's call surgeon if on diabetic medications as some need to be stopped 1 week preop   Use your usual dose of inhalers the morning of surgery. BRING your rescue inhaler with you to hospital.    Anesthesia does NOT want you to take insulin the morning of surgery. They will control your blood sugar while you are at the hospital. Please contact your ordering physician for instructions regarding your insulin the night before your procedure. If you have an insulin pump, please keep it set on basal rate. 7. Do not swallow water when brushing teeth. No gum, candy, mints or ice chips. Refrain from smoking or at least decrease the amount. 8. Dress in loose, comfortable clothing appropriate for redressing after your procedure. Do not wear jewelry (including body piercings), make-up (especially NO eye make-up), fingernail polish (NO toenail polish if foot/leg surgery), lotion, powders or metal hairclips. 9. Dentures, glasses, or contacts will need to be removed before your procedure.  Bring cases for your glasses, contacts, dentures, or hearing aids to protect them while you are in surgery. 10. If you use a CPAP, please bring it with you on the day of your procedure. 11. We recommend that valuable personal  belongings such as cash, cell phones, e-tablets or jewelry, be left at home during your stay. The hospital will not be responsible for valuables that are not secured in the hospital safe. However, if your insurance requires a co-pay, you may want to bring a method of payment, i.e. Check or credit card, if you wish to pay your co-pay the day of surgery. 12. If you are to stay overnight, you may bring a bag with personal items. Please have any large items you may need brought in by your family after your arrival to your hospital room. 15. If you have a Living Will or Durable Power of , please bring a copy on the day of your procedure. 15. With your permission, one family member may accompany you while you are being prepared for surgery. Once you are ready, additional family members may join you. HOW WE KEEP YOU SAFE and WORK TO PREVENT SURGICAL SITE INFECTIONS:  1. Health care workers should always check your ID bracelet to verify your name and birth date. You will be asked many times to state your name, date of birth, and allergies. 2. Health care workers should always clean their hands with soap or alcohol gel before providing care to you. It is okay to ask anyone if they cleaned their hands before they touch you. 3. You will be actively involved in verifying the type of procedure you are having and ensuring the correct surgical site. This will be confirmed multiple times prior to your procedure. Do NOT aisha your surgery site UNLESS instructed to by your surgeon. 4. Do not shave or wax for 72 hours prior to procedure near your operative site. Shaving with a razor can irritate your skin and make it easier to develop an infection.  On the day of your procedure, any hair that needs to be removed near the surgical site will be clipped by a healthcare worker using a special clippers designed to avoid skin irritation. 5. When you are in the operating room, your surgical site will be cleansed with a special soap, and in most cases, you will be given an antibiotic before the surgery begins. What to expect AFTER YOUR PROCEDURE:  1. Immediately following your procedure, your will be taken to the PACU for the first phase of your recovery. Your nurse will help you recover from any potential side effects of anesthesia, such as extreme drowsiness, changes in your vital signs or breathing patterns. Nausea, headache, muscle aches, or sore throat may also occur after anesthesia. Your nurse will help you manage these potential side effects. 2. For comfort and safety, arrange to have someone at home with you for the first 24 hours after discharge. 3. You and your family will be given written instructions about your diet, activity, dressing care, medications, and return visits. 4. Once at home, should issues with nausea, pain, or bleeding occur, or should you notice any signs of infection, you should call your surgeon. 5. Always clean your hands before and after caring for your wound. Do not let your family touch your surgery site without cleaning their hands. 6. Narcotic pain medications can cause significant constipation. You may want to add a stool softener to your postoperative medication schedule or speak to your surgeon on how best to manage this SIDE EFFECT. SPECIAL INSTRUCTIONS   Thank you for allowing us to care for you. We strive to exceed your expectations in the delivery of care and service provided to you and your family. If you need to contact the [place] [address] staff for any reason, please call us at [phone]    Instructions reviewed with patient during preadmission testing phone interview.   Mariah Martinez RN.7/12/2021 .3:42 PM      ADDITIONAL EDUCATIONAL INFORMATION REVIEWED PER PHONE WITH YOU AND/OR YOUR FAMILY:    Yes Antibacterial Soap

## 2021-07-13 ENCOUNTER — CARE COORDINATION (OUTPATIENT)
Dept: CASE MANAGEMENT | Age: 81
End: 2021-07-13

## 2021-07-13 NOTE — CARE COORDINATION
[facility] Transitions Follow Up Call    2021    Patient: Eugenia Jc  Patient : 1940   MRN: [id number]   Reason for Admission: Falls, CKD  Discharge Date: 21 RARS: Readmission Risk Score: 24         Spoke with: Matt SOLOMON    Care Transitions Subsequent and Final Call    Schedule Follow Up Appointment with PCP: Declined  Subsequent and Final Calls  Do you have any ongoing symptoms?: No  Have your medications changed?: No  Do you have any questions related to your medications?: No  Do you currently have any active services?: No  Do you have any needs or concerns that I can assist you with?: No  Identified Barriers: None  Care Transitions Interventions  No Identified Needs  Other Interventions:         Summary  CTN spoke with patients friend Matt Jovita this afternoon for follow up CTN call. Matt Hussein states patient is doing well, no falls in home, since returning. No reports of any fever, chills, nausea, vomiting, chest pain, SOB or cough. No difficulty with urination, BM's or appetite. Matt Hussein states patient had appointment with Nephrologist this afternoon. No new or changed medications, no other issues or concerns at this time. CTN encouraged Pat to continue to monitor patient for any of the above s/s, reporting any that may present to MD immediately. CTN provided education on s/s that require medical attention and when to seek medical attention. CTN advised Pt of use urgent care or physicians 24 hr access line if assistance is needed after hours or on the weekend. Pt denies any needs or concerns and is agreeable with additional calls.     Follow Up  Future Appointments   Date Time Provider Dimitri Clay   10/19/2021 12:30 PM Johny Herron MD [facility] Nephrolheather Harkins RN

## 2021-07-16 ENCOUNTER — HOSPITAL ENCOUNTER (EMERGENCY)
Age: 81
Discharge: LEFT BEFORE DISCHARGE | End: 2021-07-16
Payer: MEDICARE

## 2021-07-16 ENCOUNTER — CARE COORDINATION (OUTPATIENT)
Dept: CASE MANAGEMENT | Age: 81
End: 2021-07-16

## 2021-07-16 NOTE — ED NOTES
3 attempts to locate pt unsuccessful. Pt has left without treatment. Unable to locate pt to advise of possible risks of leaving without medical screening. Unable to have pt sign appropriate documentation in regards to leaving without medical screening.      Tricia Canales RN  07/16/21 [id number]

## 2021-07-19 ENCOUNTER — TELEPHONE (OUTPATIENT)
Dept: INTERNAL MEDICINE CLINIC | Age: 81
End: 2021-07-19

## 2021-07-19 ENCOUNTER — ANESTHESIA EVENT (OUTPATIENT)
Dept: OPERATING ROOM | Age: 81
End: 2021-07-19
Payer: MEDICARE

## 2021-07-19 ENCOUNTER — CARE COORDINATION (OUTPATIENT)
Dept: CASE MANAGEMENT | Age: 81
End: 2021-07-19

## 2021-07-19 DIAGNOSIS — G89.29 CHRONIC BILATERAL LOW BACK PAIN WITHOUT SCIATICA: ICD-10-CM

## 2021-07-19 DIAGNOSIS — G89.29 CHRONIC RIGHT FLANK PAIN: Primary | ICD-10-CM

## 2021-07-19 DIAGNOSIS — R10.9 CHRONIC RIGHT FLANK PAIN: Primary | ICD-10-CM

## 2021-07-19 DIAGNOSIS — M54.50 CHRONIC BILATERAL LOW BACK PAIN WITHOUT SCIATICA: ICD-10-CM

## 2021-07-19 DIAGNOSIS — M48.062 SPINAL STENOSIS OF LUMBAR REGION WITH NEUROGENIC CLAUDICATION: ICD-10-CM

## 2021-07-19 RX ORDER — OXYCODONE AND ACETAMINOPHEN 7.5; 325 MG/1; MG/1
1 TABLET ORAL EVERY 8 HOURS PRN
Qty: 90 TABLET | Refills: 0 | Status: SHIPPED | OUTPATIENT
Start: 2021-07-19 | End: 2022-07-19

## 2021-07-19 NOTE — PROGRESS NOTES
The [facility] / [facility] ([facility]) [address] [facility], [address]    Acknowledgment of Informed Consent for Surgical or Medical Procedure and Sedation  I agree to allow doctor(s) BELLA RAMOS and his/her associates or assistants, including residents and/or other qualified medical practitioner to perform the following medical treatment or procedure and to administer or direct the administration of sedation as necessary:  Procedure(s): CYSTOSCOPY, RIGHT URETEROSCOPY WITH BIOPSY / LASER RESECTION OF TUMOR   My doctor has explained the following regarding the proposed procedure:   the explanation of the procedure   the benefits of the procedure   the potential problems that might occur during recuperation   the risks and side effects of the procedure which could include but are not limited to severe blood loss, infection, stroke or death   the benefits, risks and side effect of alternative procedures including the consequences of declining this procedure or any alternative procedures   the likelihood of achieving satisfactory results. I acknowledge no guarantee or assurance has been made to me regarding the results. I understand that during the course of this treatment/procedure, unforeseen conditions can occur which require an additional or different procedure. I agree to allow my physician or assistants to perform such extension of the original procedure as they may find necessary. I understand that sedation will often result in temporary impairment of memory and fine motor skills and that sedation can occasionally progress to a state of deep sedation or general anesthesia. I understand the risks of anesthesia for surgery include, but are not limited to, sore throat, hoarseness, injury to face, mouth, or teeth; nausea; headache; injury to blood vessels or nerves; death, brain damage, or paralysis.     I understand that if I have a Limitation of Treatment order in effect during my hospitalization, the order may or may not be in effect during this procedure. I give my doctor permission to give me blood or blood products. I understand that there are risks with receiving blood such as hepatitis, AIDS, fever, or allergic reaction. I acknowledge that the risks, benefits, and alternatives of this treatment have been explained to me and that no express or implied warranty has been given by the hospital, any blood bank, or any person or entity as to the blood or blood components transfused. At the discretion of my doctor, I agree to allow observers, equipment/product representatives and allow photographing, and/or televising of the procedure, provided my name or identity is maintained confidentially. I agree the hospital may dispose of or use for scientific or educational purposes any tissue, fluid, or body parts which may be removed.     ________________________________Date________Time______ am/pm  ([place] [facility])  Patient or Signature of Closest Relative or Legal Guardian    ________________________________Date________Time______am/pm      Page 1 of  1  Witness

## 2021-07-19 NOTE — TELEPHONE ENCOUNTER
I recommend [place] pain physicians at [phone], or [facility] pain physicians at [phone]. I can cover her for a short period of time. What pharmacy?

## 2021-07-19 NOTE — CARE COORDINATION
[address] [facility] ED Follow Up Call    2021    Patient: Alena Franklin Patient : 1940   MRN: [id number]  Reason for Admission: ABD Pain, Nausea  Discharge Date:  [address] [facility] ED Follow Up    Care Transitions Interventions  No Identified Needs  Schedule Follow Up Appointment with Physician: Steven High you have any ongoing symptoms?: Yes   Onset of Patient-reported symptoms: Today   Patient-reported symptoms: Pain, Nausea   Did you call your PCP prior to going to the ED?: No - Did not call PCP   Do you have a copy of your discharge instructions?: Yes   Do you understand what to report and when to return?: Yes   Are you following your discharge instructions?: Yes   Do you have all of your prescriptions and are they filled?: Yes   Have you scheduled your follow up appointment?: Yes   How are you going to get to your appointment?: Car - family or friend to transport   Were you discharged with any Home Care or Post Acute Services or do you currently have any active services?: No         Do you have any needs or concerns that I can assist you with?: No   Identified Barriers: None      Summary  CTN spoke with patient this afternoon for follow up CTN call. Patient states she went to ED due to having chronic nerve pain and nausea. Patient states she isn't able to get in to Pain Management MD until October. Patient instructed to contact PCP, to see if she can get in for a follow up appointment and get recommendations for pain relief, other than narcotics, maybe. No other issues or concerns, no new or changed medications at this time. CTN provided education on s/s that require medical attention and when to seek medical attention. CTN advised Pt of use urgent care or physicians 24 hr access line if assistance is needed after hours or on the weekend. Pt denies any needs or concerns and is agreeable with additional calls.     Thank Darren Manjarrez RN  Care Transition Coordinator  Contact Number:[phone]

## 2021-07-19 NOTE — TELEPHONE ENCOUNTER
Yes Adan Mimi is on the hippa also he called in to see what we can do for her , if she can get a referral for the pain Doctor and a Scrip in the meantime till she gets an appt with a new pain doctor From

## 2021-07-20 ENCOUNTER — HOSPITAL ENCOUNTER (OUTPATIENT)
Age: 81
Setting detail: OUTPATIENT SURGERY
Discharge: HOME | End: 2021-07-20
Attending: STUDENT IN AN ORGANIZED HEALTH CARE EDUCATION/TRAINING PROGRAM | Admitting: STUDENT IN AN ORGANIZED HEALTH CARE EDUCATION/TRAINING PROGRAM
Payer: MEDICARE

## 2021-07-20 ENCOUNTER — ANESTHESIA (OUTPATIENT)
Dept: OPERATING ROOM | Age: 81
End: 2021-07-20
Payer: MEDICARE

## 2021-07-20 ENCOUNTER — APPOINTMENT (OUTPATIENT)
Dept: GENERAL RADIOLOGY | Age: 81
End: 2021-07-20
Attending: STUDENT IN AN ORGANIZED HEALTH CARE EDUCATION/TRAINING PROGRAM
Payer: MEDICARE

## 2021-07-20 VITALS
HEIGHT: 61 IN | TEMPERATURE: 97.2 F | DIASTOLIC BLOOD PRESSURE: 58 MMHG | HEART RATE: 69 BPM | SYSTOLIC BLOOD PRESSURE: 107 MMHG | OXYGEN SATURATION: 96 % | BODY MASS INDEX: 23.6 KG/M2 | WEIGHT: 125 LBS | RESPIRATION RATE: 15 BRPM

## 2021-07-20 VITALS — DIASTOLIC BLOOD PRESSURE: 57 MMHG | SYSTOLIC BLOOD PRESSURE: 112 MMHG | TEMPERATURE: 96.3 F | OXYGEN SATURATION: 100 %

## 2021-07-20 DIAGNOSIS — C66.1 MALIGNANT NEOPLASM OF RIGHT URETER (HCC): ICD-10-CM

## 2021-07-20 PROCEDURE — 7100000011 HC PHASE II RECOVERY - ADDTL 15 MIN: Performed by: STUDENT IN AN ORGANIZED HEALTH CARE EDUCATION/TRAINING PROGRAM

## 2021-07-20 PROCEDURE — C1758 CATHETER, URETERAL: HCPCS | Performed by: STUDENT IN AN ORGANIZED HEALTH CARE EDUCATION/TRAINING PROGRAM

## 2021-07-20 PROCEDURE — 88307 TISSUE EXAM BY PATHOLOGIST: CPT

## 2021-07-20 PROCEDURE — 7100000000 HC PACU RECOVERY - FIRST 15 MIN: Performed by: STUDENT IN AN ORGANIZED HEALTH CARE EDUCATION/TRAINING PROGRAM

## 2021-07-20 PROCEDURE — 74420 UROGRAPHY RTRGR +-KUB: CPT

## 2021-07-20 PROCEDURE — 7100000010 HC PHASE II RECOVERY - FIRST 15 MIN: Performed by: STUDENT IN AN ORGANIZED HEALTH CARE EDUCATION/TRAINING PROGRAM

## 2021-07-20 PROCEDURE — 2580000003 HC RX 258: Performed by: NURSE ANESTHETIST, CERTIFIED REGISTERED

## 2021-07-20 PROCEDURE — 2500000003 HC RX 250 WO HCPCS: Performed by: NURSE ANESTHETIST, CERTIFIED REGISTERED

## 2021-07-20 PROCEDURE — 2709999900 HC NON-CHARGEABLE SUPPLY: Performed by: STUDENT IN AN ORGANIZED HEALTH CARE EDUCATION/TRAINING PROGRAM

## 2021-07-20 PROCEDURE — 3600000014 HC SURGERY LEVEL 4 ADDTL 15MIN: Performed by: STUDENT IN AN ORGANIZED HEALTH CARE EDUCATION/TRAINING PROGRAM

## 2021-07-20 PROCEDURE — 3600000004 HC SURGERY LEVEL 4 BASE: Performed by: STUDENT IN AN ORGANIZED HEALTH CARE EDUCATION/TRAINING PROGRAM

## 2021-07-20 PROCEDURE — 6360000002 HC RX W HCPCS: Performed by: ANESTHESIOLOGY

## 2021-07-20 PROCEDURE — 3700000000 HC ANESTHESIA ATTENDED CARE: Performed by: STUDENT IN AN ORGANIZED HEALTH CARE EDUCATION/TRAINING PROGRAM

## 2021-07-20 PROCEDURE — 2580000003 HC RX 258: Performed by: STUDENT IN AN ORGANIZED HEALTH CARE EDUCATION/TRAINING PROGRAM

## 2021-07-20 PROCEDURE — C2617 STENT, NON-COR, TEM W/O DEL: HCPCS | Performed by: STUDENT IN AN ORGANIZED HEALTH CARE EDUCATION/TRAINING PROGRAM

## 2021-07-20 PROCEDURE — C1769 GUIDE WIRE: HCPCS | Performed by: STUDENT IN AN ORGANIZED HEALTH CARE EDUCATION/TRAINING PROGRAM

## 2021-07-20 PROCEDURE — 2720000010 HC SURG SUPPLY STERILE: Performed by: STUDENT IN AN ORGANIZED HEALTH CARE EDUCATION/TRAINING PROGRAM

## 2021-07-20 PROCEDURE — 3700000001 HC ADD 15 MINUTES (ANESTHESIA): Performed by: STUDENT IN AN ORGANIZED HEALTH CARE EDUCATION/TRAINING PROGRAM

## 2021-07-20 PROCEDURE — 6360000002 HC RX W HCPCS: Performed by: STUDENT IN AN ORGANIZED HEALTH CARE EDUCATION/TRAINING PROGRAM

## 2021-07-20 PROCEDURE — 6360000002 HC RX W HCPCS: Performed by: NURSE ANESTHETIST, CERTIFIED REGISTERED

## 2021-07-20 PROCEDURE — 7100000001 HC PACU RECOVERY - ADDTL 15 MIN: Performed by: STUDENT IN AN ORGANIZED HEALTH CARE EDUCATION/TRAINING PROGRAM

## 2021-07-20 PROCEDURE — 2580000003 HC RX 258: Performed by: ANESTHESIOLOGY

## 2021-07-20 DEVICE — STENT URET 6FR L24CM PERCFLX HYDR+ DBL PGTL THRD 2: Type: IMPLANTABLE DEVICE | Site: URETER | Status: FUNCTIONAL

## 2021-07-20 RX ORDER — SODIUM CHLORIDE 9 MG/ML
25 INJECTION, SOLUTION INTRAVENOUS PRN
Status: DISCONTINUED | OUTPATIENT
Start: 2021-07-20 | End: 2021-07-20 | Stop reason: HOSPADM

## 2021-07-20 RX ORDER — DEXAMETHASONE SODIUM PHOSPHATE 4 MG/ML
INJECTION, SOLUTION INTRA-ARTICULAR; INTRALESIONAL; INTRAMUSCULAR; INTRAVENOUS; SOFT TISSUE PRN
Status: DISCONTINUED | OUTPATIENT
Start: 2021-07-20 | End: 2021-07-20 | Stop reason: SDUPTHER

## 2021-07-20 RX ORDER — PROPOFOL 10 MG/ML
INJECTION, EMULSION INTRAVENOUS PRN
Status: DISCONTINUED | OUTPATIENT
Start: 2021-07-20 | End: 2021-07-20 | Stop reason: SDUPTHER

## 2021-07-20 RX ORDER — EPHEDRINE SULFATE 50 MG/ML
INJECTION INTRAVENOUS PRN
Status: DISCONTINUED | OUTPATIENT
Start: 2021-07-20 | End: 2021-07-20 | Stop reason: SDUPTHER

## 2021-07-20 RX ORDER — FENTANYL CITRATE 50 UG/ML
INJECTION, SOLUTION INTRAMUSCULAR; INTRAVENOUS PRN
Status: DISCONTINUED | OUTPATIENT
Start: 2021-07-20 | End: 2021-07-20 | Stop reason: SDUPTHER

## 2021-07-20 RX ORDER — ONDANSETRON 2 MG/ML
INJECTION INTRAMUSCULAR; INTRAVENOUS PRN
Status: DISCONTINUED | OUTPATIENT
Start: 2021-07-20 | End: 2021-07-20 | Stop reason: SDUPTHER

## 2021-07-20 RX ORDER — 0.9 % SODIUM CHLORIDE 0.9 %
500 INTRAVENOUS SOLUTION INTRAVENOUS
Status: DISCONTINUED | OUTPATIENT
Start: 2021-07-20 | End: 2021-07-20 | Stop reason: HOSPADM

## 2021-07-20 RX ORDER — COVID-19 ANTIGEN TEST
1 KIT MISCELLANEOUS DAILY
Status: ON HOLD | COMMUNITY
End: 2021-07-27 | Stop reason: HOSPADM

## 2021-07-20 RX ORDER — SODIUM CHLORIDE 0.9 % (FLUSH) 0.9 %
5-40 SYRINGE (ML) INJECTION PRN
Status: DISCONTINUED | OUTPATIENT
Start: 2021-07-20 | End: 2021-07-20 | Stop reason: HOSPADM

## 2021-07-20 RX ORDER — LIDOCAINE HYDROCHLORIDE 10 MG/ML
1 INJECTION, SOLUTION EPIDURAL; INFILTRATION; INTRACAUDAL; PERINEURAL
Status: DISCONTINUED | OUTPATIENT
Start: 2021-07-20 | End: 2021-07-20 | Stop reason: HOSPADM

## 2021-07-20 RX ORDER — ONDANSETRON 2 MG/ML
4 INJECTION INTRAMUSCULAR; INTRAVENOUS
Status: COMPLETED | OUTPATIENT
Start: 2021-07-20 | End: 2021-07-20

## 2021-07-20 RX ORDER — LIDOCAINE HYDROCHLORIDE 20 MG/ML
INJECTION, SOLUTION INTRAVENOUS PRN
Status: DISCONTINUED | OUTPATIENT
Start: 2021-07-20 | End: 2021-07-20 | Stop reason: SDUPTHER

## 2021-07-20 RX ORDER — FENTANYL CITRATE 50 UG/ML
25 INJECTION, SOLUTION INTRAMUSCULAR; INTRAVENOUS EVERY 5 MIN PRN
Status: DISCONTINUED | OUTPATIENT
Start: 2021-07-20 | End: 2021-07-20 | Stop reason: HOSPADM

## 2021-07-20 RX ORDER — MAGNESIUM HYDROXIDE 1200 MG/15ML
LIQUID ORAL PRN
Status: DISCONTINUED | OUTPATIENT
Start: 2021-07-20 | End: 2021-07-20 | Stop reason: ALTCHOICE

## 2021-07-20 RX ORDER — SULFAMETHOXAZOLE AND TRIMETHOPRIM 800; 160 MG/1; MG/1
1 TABLET ORAL 2 TIMES DAILY
Qty: 6 TABLET | Refills: 0 | Status: SHIPPED | OUTPATIENT
Start: 2021-07-20 | End: 2021-07-23

## 2021-07-20 RX ORDER — SODIUM CHLORIDE, SODIUM LACTATE, POTASSIUM CHLORIDE, CALCIUM CHLORIDE 600; 310; 30; 20 MG/100ML; MG/100ML; MG/100ML; MG/100ML
INJECTION, SOLUTION INTRAVENOUS CONTINUOUS PRN
Status: DISCONTINUED | OUTPATIENT
Start: 2021-07-20 | End: 2021-07-20 | Stop reason: SDUPTHER

## 2021-07-20 RX ORDER — SODIUM CHLORIDE 0.9 % (FLUSH) 0.9 %
5-40 SYRINGE (ML) INJECTION EVERY 12 HOURS SCHEDULED
Status: DISCONTINUED | OUTPATIENT
Start: 2021-07-20 | End: 2021-07-20 | Stop reason: HOSPADM

## 2021-07-20 RX ORDER — CIPROFLOXACIN 2 MG/ML
400 INJECTION, SOLUTION INTRAVENOUS ONCE
Status: COMPLETED | OUTPATIENT
Start: 2021-07-20 | End: 2021-07-20

## 2021-07-20 RX ORDER — SODIUM CHLORIDE, SODIUM LACTATE, POTASSIUM CHLORIDE, CALCIUM CHLORIDE 600; 310; 30; 20 MG/100ML; MG/100ML; MG/100ML; MG/100ML
INJECTION, SOLUTION INTRAVENOUS CONTINUOUS
Status: DISCONTINUED | OUTPATIENT
Start: 2021-07-20 | End: 2021-07-20 | Stop reason: HOSPADM

## 2021-07-20 RX ADMIN — ONDANSETRON 4 MG: 2 INJECTION INTRAMUSCULAR; INTRAVENOUS at 12:10

## 2021-07-20 RX ADMIN — DEXAMETHASONE SODIUM PHOSPHATE 4 MG: 4 INJECTION, SOLUTION INTRAMUSCULAR; INTRAVENOUS at 09:49

## 2021-07-20 RX ADMIN — FENTANYL CITRATE 25 MCG: 50 INJECTION, SOLUTION INTRAMUSCULAR; INTRAVENOUS at 10:08

## 2021-07-20 RX ADMIN — EPHEDRINE SULFATE 5 MG: 50 INJECTION INTRAVENOUS at 10:13

## 2021-07-20 RX ADMIN — ONDANSETRON 4 MG: 2 INJECTION INTRAMUSCULAR; INTRAVENOUS at 09:49

## 2021-07-20 RX ADMIN — FENTANYL CITRATE 25 MCG: 50 INJECTION INTRAMUSCULAR; INTRAVENOUS at 12:11

## 2021-07-20 RX ADMIN — SODIUM CHLORIDE, POTASSIUM CHLORIDE, SODIUM LACTATE AND CALCIUM CHLORIDE: 600; 310; 30; 20 INJECTION, SOLUTION INTRAVENOUS at 07:31

## 2021-07-20 RX ADMIN — CIPROFLOXACIN 400 MG: 2 INJECTION, SOLUTION INTRAVENOUS at 09:53

## 2021-07-20 RX ADMIN — EPHEDRINE SULFATE 5 MG: 50 INJECTION INTRAVENOUS at 10:16

## 2021-07-20 RX ADMIN — LIDOCAINE HYDROCHLORIDE 60 MG: 20 INJECTION, SOLUTION INTRAVENOUS at 09:49

## 2021-07-20 RX ADMIN — EPHEDRINE SULFATE 5 MG: 50 INJECTION INTRAVENOUS at 10:08

## 2021-07-20 RX ADMIN — SODIUM CHLORIDE, SODIUM LACTATE, POTASSIUM CHLORIDE, AND CALCIUM CHLORIDE: .6; .31; .03; .02 INJECTION, SOLUTION INTRAVENOUS at 07:31

## 2021-07-20 RX ADMIN — PROPOFOL 100 MG: 10 INJECTION, EMULSION INTRAVENOUS at 09:49

## 2021-07-20 RX ADMIN — FENTANYL CITRATE 25 MCG: 50 INJECTION, SOLUTION INTRAMUSCULAR; INTRAVENOUS at 09:49

## 2021-07-20 ASSESSMENT — PULMONARY FUNCTION TESTS
PIF_VALUE: 4
PIF_VALUE: 2
PIF_VALUE: 11
PIF_VALUE: 4
PIF_VALUE: 4
PIF_VALUE: 1
PIF_VALUE: 4
PIF_VALUE: 8
PIF_VALUE: 3
PIF_VALUE: 3
PIF_VALUE: 4
PIF_VALUE: 4
PIF_VALUE: 3
PIF_VALUE: 1
PIF_VALUE: 6
PIF_VALUE: 4
PIF_VALUE: 4
PIF_VALUE: 3
PIF_VALUE: 3
PIF_VALUE: 4
PIF_VALUE: 1
PIF_VALUE: 4
PIF_VALUE: 10
PIF_VALUE: 4
PIF_VALUE: 3
PIF_VALUE: 4
PIF_VALUE: 4
PIF_VALUE: 3
PIF_VALUE: 4
PIF_VALUE: 3
PIF_VALUE: 10
PIF_VALUE: 4
PIF_VALUE: 4
PIF_VALUE: 11
PIF_VALUE: 4
PIF_VALUE: 3
PIF_VALUE: 3
PIF_VALUE: 17
PIF_VALUE: 4
PIF_VALUE: 3
PIF_VALUE: 3
PIF_VALUE: 4
PIF_VALUE: 3
PIF_VALUE: 4
PIF_VALUE: 3
PIF_VALUE: 3
PIF_VALUE: 4
PIF_VALUE: 2
PIF_VALUE: 1
PIF_VALUE: 4
PIF_VALUE: 4
PIF_VALUE: 3
PIF_VALUE: 4
PIF_VALUE: 3
PIF_VALUE: 4
PIF_VALUE: 4
PIF_VALUE: 3
PIF_VALUE: 5
PIF_VALUE: 4
PIF_VALUE: 4
PIF_VALUE: 3
PIF_VALUE: 1
PIF_VALUE: 1
PIF_VALUE: 4
PIF_VALUE: 3
PIF_VALUE: 4
PIF_VALUE: 3

## 2021-07-20 ASSESSMENT — PAIN DESCRIPTION - ONSET: ONSET: GRADUAL

## 2021-07-20 ASSESSMENT — PAIN - FUNCTIONAL ASSESSMENT
PAIN_FUNCTIONAL_ASSESSMENT: PREVENTS OR INTERFERES SOME ACTIVE ACTIVITIES AND ADLS
PAIN_FUNCTIONAL_ASSESSMENT: 0-10

## 2021-07-20 ASSESSMENT — PAIN DESCRIPTION - DESCRIPTORS
DESCRIPTORS: ACHING
DESCRIPTORS: ACHING

## 2021-07-20 ASSESSMENT — PAIN DESCRIPTION - PAIN TYPE: TYPE: SURGICAL PAIN

## 2021-07-20 ASSESSMENT — PAIN SCALES - GENERAL
PAINLEVEL_OUTOF10: 2
PAINLEVEL_OUTOF10: 0
PAINLEVEL_OUTOF10: 4
PAINLEVEL_OUTOF10: 2

## 2021-07-20 ASSESSMENT — PAIN DESCRIPTION - LOCATION: LOCATION: ABDOMEN

## 2021-07-20 ASSESSMENT — PAIN DESCRIPTION - FREQUENCY: FREQUENCY: CONTINUOUS

## 2021-07-20 NOTE — PROGRESS NOTES
PACU Transfer to [facility]    Vitals:    07/20/21 1215   BP: (!) 112/48   Pulse: 65   Resp: 13   Temp: 96.8 °F (36 °C)   SpO2: 94%     BP within 20% baseline    Intake/Output Summary (Last 24 hours) at 7/20/2021 1248  Last data filed at 7/20/2021 1114  Gross per 24 hour   Intake 650 ml   Output    Net 650 ml       Pain assessment:   Pain Level: 2    Patient transferred to care of [facility] RN.    7/20/2021 12:48 PM

## 2021-07-20 NOTE — OP NOTE
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 TUMOR OF RIGHT URETHER  Tissue Tissue SURGICAL PATHOLOGY Melodie Ray MD 7/20/2021 1031        Implants:  * No implants in log *      Drains: * No LDAs found *    Indications:  80 y.o. female who originally presented with right hydroureteronephrosis. She was evaluated by Dr. Jevon Palmer and found to have a large right ureteral tumor. 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 She desired an additional attempt at endoscopic resection and had repeat ureteral biopsy on 4/21/21 with me. This was also an incomplete resection. Pathology was upgraded to [address] [facility]. Given her other medical issues she wished to avoid any major surgery and instead try to remove the tumor piecemeal if necessary. She agreed to undergo the above named procedure after discussion of the alternatives, risks and benefits. Informed consent was obtained.       Findings:  Cystoscopy revealed no tumors, stones or other mucosal abnormalities. Ureteroscopy revealed the large right mid ureteral tumor to still be present. This tumor was stable in size (~2 cm) and nearly completely blocking the lumen of the right ureter. Using the laser and blunt dissection with the scope, I was able to break free much of the tumor. A very large portion was removed.  Following this, endoscopic evaluation showed some fulgurated tissue in the lumen of the ureter, but it was not clear if this was residual tumor or artifact from fulgurated normal ureteral tissue. The ureter was certainly more passable with the bulk of the tumor removed. The decision was made to stop and come back for a repeat evaluation in a couple of months. Retrograde pyelogram revealed a moderately dilated system with no obvious filling defects in the renal pelvis or calyces.     Procedure: The patient was taken to the operating room and placed supine on the operating table. Pre-operative antibiotics were administered. Bilateral lower extremity SCDs were placed. After induction of general anesthesia the patient was positioned in dorsal lithotomy, prepped and draped in a sterile fashion. A time-out was performed.       A 21 Moroccan rigid cystoscope was passed carefully via urethra into the bladder. The right ureteral stent was identified and brought to the meatus with a grasper. A Sensor wire was passed through the stent to the kidney and the stent was discarded. The semirigid ureteroscope was passed carefully along the Sensor wire through the urethra and into the distal ureter. The tumor was encountered and fulgurated with the holmium laser with settings of 0.8J/10Hz. I targeted the edges/base of the tumor. The scope was also used to gently dissect the tumor away from the wall with blunt manipulation. The stone basket was used to grab small pieces of the tumor until it was able to grab a very large chunk and extract it. The ureter was re-evaluated as described above. The ureteroscope was withdrawn. A 5-Moroccan open-ended was passed over the wire and the wire removed. A retrograde pyelogram was performed by slowly injecting contrast via the 5 Moroccan catheter with findings described above. An Amplatz super-stiff was placed to the level of the renal pelvis confirmed by fluoroscopy.   A 6 Fr x 24 cm stent was positioned with the upper end in the upper pole and the lower in the bladder confirmed by fluoroscopy. The bladder was emptied and the procedure was complete. The patient tolerated the procedure well and was stable throughout.     I was present in the procedure room the entire duration of the case.     Plan:  -Discharge home today  -Plan on repeat endoscopic evaluation in 2 months    Electronically signed by David Quiles MD on 7/20/2021 at 11:25 AM

## 2021-07-20 NOTE — ANESTHESIA POSTPROCEDURE EVALUATION
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        Anesthesia Post Evaluation    Patient location during evaluation: bedside  Patient participation: complete - patient participated  Level of consciousness: responsive to verbal stimuli  Pain score: 0  Airway patency: patent  Nausea & Vomiting: no nausea and no vomiting  Complications: no  Cardiovascular status: blood pressure returned to baseline and hemodynamically unstable  Respiratory status: acceptable  Hydration status: euvolemic

## 2021-07-20 NOTE — H&P
Deonte Hays    [id number]    [facility] H & P  Department of General Surgery   Surgical Service   Pre-operative History and Physical  Last H & P within the last 30 days. 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 Covid 19:  Patient denies fever, chills, cough or known exposure to Covid-19. Past Medical History:        Diagnosis Date    Anxiety     Colon polyps     check q5yrs    CVA (cerebral vascular accident) ([address] 10/12/2018    Lacunar, left hemisphere    HTN (hypertension) 10/2011    Hyperlipidemia     Kidney stones     Neurologic gait dysfunction     Proteinuria 10/2011    Screening mammogram 2010    benign    Ureteral lesion, native, right      Past Surgical History:        Procedure Laterality Date    COLONOSCOPY  2010    Dr. Quintin Tovar - recheck 5 yrs    CYSTOSCOPY      CYSTOSCOPY Right 4/21/2021    CYSTOSCOPY, RIGHT URETEROSCOPY, WITH RETROGRADE, RIGHT URETERAL BIOPSY,  RIGHT STENT EXCHANGE performed by [facility]berg [facility]  5/8/2015    sphincter and stent    EYE SURGERY Bilateral     cataract removal    MOUTH SURGERY      WRIST FRACTURE SURGERY Left 12/23/2016    OPEN REDUCTION INTERNAL FIXATION LEFT DISTAL RADIUS FRACTURE     Past Social History:  Social History     Socioeconomic History    Marital status:       Spouse name: None    Number of children: 0    Years of education: None    Highest education level: None   Occupational History    None   Tobacco Use    Smoking status: Current Every Day Smoker     Packs/day: 1.00     Years: 50.00     Pack years: 50.00     Types: Cigarettes     Start date: 5/1/1958    Smokeless tobacco: Never Used   Vaping Use    Vaping Use: Never used   Substance and Sexual Activity    Alcohol use: No     Alcohol/week: 0.0 standard drinks    Drug use: Never    Sexual activity: Never   Other Topics Concern    None   Social History Narrative    None     Social Determinants of Health     Financial Resource Strain: Low Risk     Difficulty of Paying Living Expenses: Not hard at all   Food Insecurity: No Food Insecurity    Worried About Running Out of Food in the Last Year: Never true    Jeanette of Food in the Last Year: Never true   Transportation Needs: No Transportation Needs    Lack of Transportation (Medical): No    Lack of Transportation (Non-Medical): No   Physical Activity:     Days of Exercise per Week:     Minutes of Exercise per Session:    Stress:     Feeling of Stress :    Social Connections:     Frequency of Communication with Friends and Family:     Frequency of Social Gatherings with Friends and Family:     Attends [employer] Services:     Active Member of Clubs or Organizations:     Attends Club or Organization Meetings:     Marital Status:    Intimate Partner Violence:     Fear of Current or Ex-Partner:     Emotionally Abused:     Physically Abused:     Sexually Abused:          Medications Prior to Admission:      Prior to Admission medications    Medication Sig Start Date End Date Taking? Authorizing Provider   Naproxen Sodium (ALEVE) 220 MG CAPS Take 1 capsule by mouth daily   Yes Historical Provider, MD   oxyCODONE-acetaminophen (PERCOCET) 7.5-325 MG per tablet Take 1 tablet by mouth every 8 hours as needed for Pain (chronic back pain. ). Chronic back pain.  7/19/21 7/19/22 Yes Matteo Mendoza MD   ondansetron (ZOFRAN-ODT) 4 MG disintegrating tablet Take 1 tablet by mouth 3 times daily as needed for Nausea or Vomiting 7/13/21  Yes Blanca Pena MD   vitamin D (ERGOCALCIFEROL) 1.25 MG (02239 UT) CAPS capsule Take 1 capsule by mouth once a week 6/30/21 7/30/21 Yes Digna Valles MD   atenolol (TENORMIN) 25 MG tablet TAKE ONE TABLET BY MOUTH DAILY 6/28/21  Yes Baltazar Barrera MD   venlafaxine (EFFEXOR XR) 75 MG extended release capsule TAKE ONE CAPSULE BY MOUTH DAILY 6/14/21  Yes Susan Phalen, MD   hydrOXYzine (ATARAX) 50 MG tablet Take 1 tablet by mouth 3 times daily as needed for Anxiety 5/7/21  Yes Susan Phalen, MD   mirtazapine (REMERON) 7.5 MG tablet Take 1 tablet by mouth nightly 5/7/21  Yes Susan Phalen, MD         Allergies:  Penicillins    PHYSICAL EXAM:      /65   Pulse 60   Temp 98.7 °F (37.1 °C) (Oral)   Resp 18   Ht 5' 1\" (1.549 m)   Wt 125 lb (56.7 kg)   SpO2 97%   BMI 23.62 kg/m²      Airway:  Airway patent with no audible stridor    Heart:  Regular rate and rhythm, No murmur noted    Lungs:  No increased work of breathing, good air exchange, clear to auscultation bilaterally, no crackles or wheezing    Abdomen:  Soft, non-distended, non-tender, no rebound tenderness or guarding, and no masses palpated    ASSESSMENT AND PLAN     Patient is a 80 y.o. female with above specified procedure planned. 1.  The patients history and physical was obtained and signed off by the pre-admission testing department. Patient seen and focused exam done today- no new changes since last physical exam on 7/7/21    2. Access to ancillary services are available per request of the provider.     BISHNU Rios - CNP     7/20/2021

## 2021-07-20 NOTE — ANESTHESIA PRE PROCEDURE
Department of Anesthesiology  Preprocedure Note       Name:  Tevin Slater   Age:  80 y.o.  :  1940                                          MRN:  [id number]         Date:  2021      Surgeon: Opal Montgomery):  Melodie Ray MD    Procedure: Procedure(s):  CYSTOSCOPY, RIGHT URETEROSCOPY WITH BIOPSY / LASER RESECTION OF TUMOR    Medications prior to admission:   Prior to Admission medications    Medication Sig Start Date End Date Taking? Authorizing Provider   Naproxen Sodium (ALEVE) 220 MG CAPS Take 1 capsule by mouth daily   Yes Historical Provider, MD   oxyCODONE-acetaminophen (PERCOCET) 7.5-325 MG per tablet Take 1 tablet by mouth every 8 hours as needed for Pain (chronic back pain. ). Chronic back pain.  21 Yes Greer Howard MD   ondansetron (ZOFRAN-ODT) 4 MG disintegrating tablet Take 1 tablet by mouth 3 times daily as needed for Nausea or Vomiting 21  Yes Fermin Gudino MD   vitamin D (ERGOCALCIFEROL) 1.25 MG (58852 UT) CAPS capsule Take 1 capsule by mouth once a week 21 Yes Yulisa Ferrera MD   atenolol (TENORMIN) 25 MG tablet TAKE ONE TABLET BY MOUTH DAILY 21  Yes Yulisa Ferrera MD   venlafaxine (EFFEXOR XR) 75 MG extended release capsule TAKE ONE CAPSULE BY MOUTH DAILY 21  Yes Greer Howard MD   hydrOXYzine (ATARAX) 50 MG tablet Take 1 tablet by mouth 3 times daily as needed for Anxiety 21  Yes Greer Howard MD   mirtazapine (REMERON) 7.5 MG tablet Take 1 tablet by mouth nightly 21  Yes Greer Howard MD       Current medications:    Current Facility-Administered Medications   Medication Dose Route Frequency Provider Last Rate Last Admin    ciprofloxacin (CIPRO) IVPB 400 mg  400 mg Intravenous Once Melodie Ray MD        lactated ringers infusion   Intravenous Continuous Patrick Lee  mL/hr at 21 0731 New Bag at 21 0731    sodium chloride flush 0.9 % injection 5-40 mL  5-40 mL Intravenous 2 times per day Imelda Manning Drake Crystal MD        sodium chloride flush 0.9 % injection 5-40 mL  5-40 mL Intravenous PRN Klaus Chris MD        0.9 % sodium chloride infusion  25 mL Intravenous PRN Klaus Chris MD        lidocaine PF 1 % injection 1 mL  1 mL Intradermal Once PRN Klaus Chris MD           Allergies: Allergies   Allergen Reactions    Penicillins Swelling     Swelling in vaginal area only  Vaginal swelling -- Pt cannot remember all symptoms;       Problem List:    Patient Active Problem List   Diagnosis Code    Anxiety and depression F41.9, F32.9    Colon polyps K63.5    Tremor R25.1    Cigarette smoker F17.210    Hyperlipidemia E78.5    Essential hypertension I10    Thyroid nodule E04.1    Chronic bilateral low back pain without sciatica M54.5, G89.29    Abnormal CT of the abdomen R93.5    Renal mass, right N28.89    Chronic right flank pain R10.9, G89.29    Chronic, continuous use of opioids F11.90    Spinal stenosis of lumbar region with neurogenic claudication M48.062    Other osteoporosis without current pathological fracture M81.8    Other emphysema (HCC) J43.8    CVA (cerebral vascular accident) ([address] I63.9    Sleep difficulties G47.9    Acute renal failure superimposed on stage 3a chronic kidney disease (HCC) N17.9, N18.31    Hydronephrosis N13.30    Urothelial carcinoma of right distal ureter (HCC) C66.1    Fall at home, sequela W19. XXXS, Y92.009    Acidosis E87.2    Hyperkalemia E87.5    Scalp laceration S01. 01XA    Acute encephalopathy G93.40    Acute UTI N39.0    Hypotension due to hypovolemia I95.89, E86.1    Falls frequently R29.6    CHAPO (acute kidney injury) ([address] N17.9       Past Medical History:        Diagnosis Date    Anxiety     Colon polyps     check q5yrs    CVA (cerebral vascular accident) ([address] 10/12/2018    Lacunar, left hemisphere    HTN (hypertension) 10/2011    Hyperlipidemia     Kidney stones     Neurologic gait dysfunction     Proteinuria 10/2011  Screening mammogram 2010    benign    Ureteral lesion, native, right        Past Surgical History:        Procedure Laterality Date    COLONOSCOPY  2010    Dr. Duggan Arrow - recheck 5 yrs    CYSTOSCOPY      CYSTOSCOPY Right 4/21/2021    CYSTOSCOPY, RIGHT URETEROSCOPY, WITH RETROGRADE, RIGHT URETERAL BIOPSY,  RIGHT STENT EXCHANGE performed by Janet Wilson MD at [facility]  5/8/2015    sphincter and stent    EYE SURGERY Bilateral     cataract removal    MOUTH SURGERY      WRIST FRACTURE SURGERY Left 12/23/2016    OPEN REDUCTION INTERNAL FIXATION LEFT DISTAL RADIUS FRACTURE       Social History:    Social History     Tobacco Use    Smoking status: Current Every Day Smoker     Packs/day: 1.00     Years: 50.00     Pack years: 50.00     Types: Cigarettes     Start date: 5/1/1958    Smokeless tobacco: Never Used   Substance Use Topics    Alcohol use: No     Alcohol/week: 0.0 standard drinks                                Ready to quit: Not Answered  Counseling given: Not Answered      Vital Signs (Current):   Vitals:    07/12/21 1538 07/20/21 0710   BP:  110/65   Pulse:  60   Resp:  18   Temp:  98.7 °F (37.1 °C)   TempSrc:  Oral   SpO2:  97%   Weight: 125 lb (56.7 kg) 125 lb (56.7 kg)   Height: 5' 1\" (1.549 m) 5' 1\" (1.549 m)                                              BP Readings from Last 3 Encounters:   07/20/21 110/65   07/13/21 (!) 118/58   07/07/21 (!) 140/80       NPO Status: Time of last liquid consumption: 0615                        Time of last solid consumption: 1900                        Date of last liquid consumption: 07/20/21                        Date of last solid food consumption: 07/19/21    BMI:   Wt Readings from Last 3 Encounters:   07/20/21 125 lb (56.7 kg)   07/13/21 125 lb (56.7 kg)   07/07/21 125 lb 3.2 oz (56.8 kg)     Body mass index is 23.62 kg/m².     CBC:   Lab Results   Component Value Date    WBC 5.1 06/28/2021    RBC 3.50 06/28/2021    HGB 10.5 06/28/2021 HCT 32.0 06/28/2021    MCV 91.4 06/28/2021    RDW 15.3 06/28/2021     06/28/2021       CMP:   Lab Results   Component Value Date     07/07/2021    K 5.1 07/07/2021    K 4.9 06/28/2021     07/07/2021    CO2 22 07/07/2021    BUN 35 07/07/2021    CREATININE 1.6 07/07/2021    GFRAA 38 07/07/2021    GFRAA >60 03/27/2013    AGRATIO 1.2 06/26/2021    LABGLOM 31 07/07/2021    GLUCOSE 110 07/07/2021    GLUCOSE 91 10/07/2011    PROT 7.5 06/26/2021    PROT 7.4 10/02/2012    CALCIUM 9.3 07/07/2021    BILITOT <0.2 06/26/2021    ALKPHOS 74 06/26/2021    AST 16 06/26/2021    ALT 12 06/26/2021       POC Tests: No results for input(s): POCGLU, POCNA, POCK, POCCL, POCBUN, POCHEMO, POCHCT in the last 72 hours.     Coags:   Lab Results   Component Value Date    PROTIME 10.9 12/18/2020    INR 0.94 12/18/2020       HCG (If Applicable): No results found for: PREGTESTUR, PREGSERUM, HCG, HCGQUANT     ABGs:   Lab Results   Component Value Date    PHART 7.327 06/26/2021    PO2ART 63.9 06/26/2021    RFG2AXU 33.5 06/26/2021    BFB1JEE 18 06/26/2021    BEART -7.6 06/26/2021    N0SQPETN 94 06/26/2021        Type & Screen (If Applicable):  No results found for: LABABO, LABRH    Drug/Infectious Status (If Applicable):  No results found for: HIV, HEPCAB    COVID-19 Screening (If Applicable):   Lab Results   Component Value Date    COVID19 Not Detected 04/16/2021           Anesthesia Evaluation  Patient summary reviewed  Airway: Mallampati: III  TM distance: >3 FB   Neck ROM: full  Mouth opening: > = 3 FB Dental: normal exam         Pulmonary:normal exam  breath sounds clear to auscultation  (+) COPD:                             Cardiovascular:  Exercise tolerance: good (>4 METS),   (+) hypertension:,         Rhythm: regular  Rate: normal                    Neuro/Psych:   (+) CVA: residual symptoms, psychiatric history: stable with treatment            GI/Hepatic/Renal: Neg GI/Hepatic/Renal ROS            Endo/Other: Negative Endo/Other ROS                    Abdominal:             Vascular: negative vascular ROS. Other Findings:             Anesthesia Plan      general     ASA 3       Induction: intravenous. Anesthetic plan and risks discussed with patient. Use of blood products discussed with patient whom consented to blood products. Plan discussed with CRNA.     Attending anesthesiologist reviewed and agrees with Preprocedure content              Guillaume Estes DO   7/20/2021

## 2021-07-20 NOTE — PROGRESS NOTES
Pt admitted to PACU s/p CYSTOSCOPY, RIGHT URETEROSCOPY WITH BIOPSY / LASER RESECTION OF TUMOR - responds to voice- PO 96$ on RA- resp full/easy, no c/o pain/nausea

## 2021-07-20 NOTE — PROGRESS NOTES
Ambulatory Surgery/Procedure Discharge Note    Vitals:    07/20/21 1307   BP: (!) 107/58   Pulse: 69   Resp: 15   Temp: 97.2 °F (36.2 °C)   SpO2: 96%       In: 650 [I.V.:650]  Out: - 150    Restroom use offered before discharge. yes    Pain assessment:    Pain Level: 2        Patient discharged to home/self care.  Patient discharged via wheel chair by transporter to waiting family/S.O.       7/20/2021 1:45 PM

## 2021-07-20 NOTE — BRIEF OP NOTE
Brief Postoperative Note      Patient: Jax Dominguez  YOB: 1940  MRN: [id number]    Date of Procedure: 7/20/2021    Pre-Op Diagnosis: Malignant neoplasm of right ureter ([address] [C66.1]    Post-Op Diagnosis: Same       Procedure(s):  CYSTOSCOPY, RIGHT URETEROSCOPY WITH BIOPSY / LASER RESECTION OF TUMOR    Surgeon(s):  Sherlyn Pink MD    Assistant:  * No surgical staff found *    Anesthesia: General    Estimated Blood Loss (mL): Minimal    Complications: None    Specimens:   ID Type Source Tests Collected by Time Destination   A : A. TUMOR OF RIGHT URETHER  Tissue Tissue SURGICAL PATHOLOGY Sherlyn Pink MD 7/20/2021 1031        Implants:  * No implants in log *      Drains: * No LDAs found *    Findings: Resection of large volume of ureteral tumor.  Possible residual disease present    Electronically signed by Sherlyn Pink MD on 7/20/2021 at 11:25 AM

## 2021-07-21 ENCOUNTER — TELEPHONE (OUTPATIENT)
Dept: INTERNAL MEDICINE CLINIC | Age: 81
End: 2021-07-21

## 2021-07-21 NOTE — TELEPHONE ENCOUNTER
Do not take any more of the Bactrim/sulfamethoxazole today. Drink plenty water. Resume taking it tomorrow. What is the question about the amitriptyline?   If she has been on it and doing well then I recommend she stay on it

## 2021-07-21 NOTE — TELEPHONE ENCOUNTER
Pt called into the office to request help with her medication lists. One of her main concerns were the two anti depressant that she is taking in combination with another medication ( amitriptyline 25mg) that was prescribed by the pain doctor. Pt also has concerns about other medications. The patient has been dizzy and shaky because she take two of the sulfamethoxazole-trimethoprim on accident. Pt also has questions about solifenacin 5mg and tamsulosin that was prescribed by  0.4mg (urology). She wants to know if she still needs to take it. Please advise. Please send through [employer] and call.

## 2021-07-22 ENCOUNTER — CARE COORDINATION (OUTPATIENT)
Dept: CASE MANAGEMENT | Age: 81
End: 2021-07-22

## 2021-07-22 NOTE — CARE COORDINATION
[facility] [facility] Transitions Follow Up Call    2021    Patient: Itz Dennis  Patient : 1940   MRN: [id number]   Reason for Admission: Falls, CKD  Discharge Date: 21 RARS: Readmission Risk Score: 24         Spoke with: [address]  Transitions Subsequent and Final Call    Schedule Follow Up Appointment with PCP: Declined  Subsequent and Final Calls  Do you have any ongoing symptoms?: Yes  Onset of Patient-reported symptoms: Other  Patient-reported symptoms: Pain, Other  Have your medications changed?: No  Do you have any questions related to your medications?: No  Do you currently have any active services?: No  Do you have any needs or concerns that I can assist you with?: No  Identified Barriers: None  Care Transitions Interventions  No Identified Needs  Other Interventions:         Summary  CTN spoke with patient this afternoon for follow up CTN call. Patient states she is doing well, reporting no complaints of any fever, chills, nausea, vomiting, chest pain, SOB or cough. Patient with chronic pain that is still present, but better. No reports of any difficulty with urination, BM's or appetite. Patient with no other issues or concerns, no new or changed medications. CTN instructed patient to continue to monitor for any worsening pain, as well as monitor for any of the other above s/s, reporting any that may present to MD immediately. CTN provided education on s/s that require medical attention and when to seek medical attention. CTN advised Pt of use urgent care or physicians 24 hr access line if assistance is needed after hours or on the weekend. Pt denies any needs or concerns and is agreeable with additional calls.     Follow Up  Future Appointments   Date Time Provider Dimitri Clay   10/18/2021  1:00 PM [facility] [place] [facility]   10/19/2021 12:30 PM Omar Sommer MD [facility]       Jillian Gomez RN

## 2021-07-24 ENCOUNTER — HOSPITAL ENCOUNTER (INPATIENT)
Age: 81
LOS: 3 days | Discharge: NURSING, REHAB OR LONG-TERM CARE | DRG: 982 | End: 2021-07-27
Attending: STUDENT IN AN ORGANIZED HEALTH CARE EDUCATION/TRAINING PROGRAM | Admitting: HOSPITALIST
Payer: MEDICARE

## 2021-07-24 ENCOUNTER — APPOINTMENT (OUTPATIENT)
Dept: CT IMAGING | Age: 81
DRG: 982 | End: 2021-07-24
Payer: MEDICARE

## 2021-07-24 DIAGNOSIS — N17.9 ACUTE KIDNEY INJURY (HCC): Primary | ICD-10-CM

## 2021-07-24 LAB
ALBUMIN SERPL-MCNC: 3.5 G/DL (ref 3.4–5)
ANION GAP SERPL CALCULATED.3IONS-SCNC: 11 MMOL/L (ref 3–16)
ANION GAP SERPL CALCULATED.3IONS-SCNC: 7 MMOL/L (ref 3–16)
ANION GAP SERPL CALCULATED.3IONS-SCNC: 9 MMOL/L (ref 3–16)
BACTERIA: ABNORMAL /HPF
BASOPHILS ABSOLUTE: 0.1 K/UL (ref 0–0.2)
BASOPHILS RELATIVE PERCENT: 0.6 %
BILIRUBIN URINE: NEGATIVE
BLOOD, URINE: ABNORMAL
BUN BLDV-MCNC: 49 MG/DL (ref 7–20)
BUN BLDV-MCNC: 55 MG/DL (ref 7–20)
BUN BLDV-MCNC: 61 MG/DL (ref 7–20)
CALCIUM SERPL-MCNC: 8.4 MG/DL (ref 8.3–10.6)
CALCIUM SERPL-MCNC: 8.6 MG/DL (ref 8.3–10.6)
CALCIUM SERPL-MCNC: 8.7 MG/DL (ref 8.3–10.6)
CHLORIDE BLD-SCNC: 101 MMOL/L (ref 99–110)
CHLORIDE BLD-SCNC: 105 MMOL/L (ref 99–110)
CHLORIDE BLD-SCNC: 105 MMOL/L (ref 99–110)
CLARITY: CLEAR
CO2: 20 MMOL/L (ref 21–32)
CO2: 22 MMOL/L (ref 21–32)
CO2: 23 MMOL/L (ref 21–32)
COLOR: YELLOW
CREAT SERPL-MCNC: 2.2 MG/DL (ref 0.6–1.2)
CREAT SERPL-MCNC: 2.4 MG/DL (ref 0.6–1.2)
CREAT SERPL-MCNC: 2.7 MG/DL (ref 0.6–1.2)
EOSINOPHILS ABSOLUTE: 0.2 K/UL (ref 0–0.6)
EOSINOPHILS RELATIVE PERCENT: 2.4 %
EPITHELIAL CELLS, UA: ABNORMAL /HPF (ref 0–5)
GFR AFRICAN AMERICAN: 21
GFR AFRICAN AMERICAN: 24
GFR AFRICAN AMERICAN: 26
GFR NON-AFRICAN AMERICAN: 17
GFR NON-AFRICAN AMERICAN: 19
GFR NON-AFRICAN AMERICAN: 22
GLUCOSE BLD-MCNC: 103 MG/DL (ref 70–99)
GLUCOSE BLD-MCNC: 114 MG/DL (ref 70–99)
GLUCOSE BLD-MCNC: 93 MG/DL (ref 70–99)
GLUCOSE URINE: NEGATIVE MG/DL
HCT VFR BLD CALC: 32.3 % (ref 36–48)
HEMOGLOBIN: 10.6 G/DL (ref 12–16)
KETONES, URINE: NEGATIVE MG/DL
LEUKOCYTE ESTERASE, URINE: ABNORMAL
LYMPHOCYTES ABSOLUTE: 0.9 K/UL (ref 1–5.1)
LYMPHOCYTES RELATIVE PERCENT: 10.6 %
MCH RBC QN AUTO: 30.6 PG (ref 26–34)
MCHC RBC AUTO-ENTMCNC: 32.9 G/DL (ref 31–36)
MCV RBC AUTO: 93 FL (ref 80–100)
MICROSCOPIC EXAMINATION: YES
MONOCYTES ABSOLUTE: 1 K/UL (ref 0–1.3)
MONOCYTES RELATIVE PERCENT: 12 %
NEUTROPHILS ABSOLUTE: 6.4 K/UL (ref 1.7–7.7)
NEUTROPHILS RELATIVE PERCENT: 74.4 %
NITRITE, URINE: NEGATIVE
PDW BLD-RTO: 14.3 % (ref 12.4–15.4)
PH UA: 6 (ref 5–8)
PHOSPHORUS: 4.2 MG/DL (ref 2.5–4.9)
PLATELET # BLD: 241 K/UL (ref 135–450)
PMV BLD AUTO: 9.2 FL (ref 5–10.5)
POTASSIUM REFLEX MAGNESIUM: 5.5 MMOL/L (ref 3.5–5.1)
POTASSIUM SERPL-SCNC: 5.4 MMOL/L (ref 3.5–5.1)
POTASSIUM SERPL-SCNC: 6.3 MMOL/L (ref 3.5–5.1)
PROTEIN UA: 100 MG/DL
RBC # BLD: 3.47 M/UL (ref 4–5.2)
RBC UA: >100 /HPF (ref 0–4)
SODIUM BLD-SCNC: 132 MMOL/L (ref 136–145)
SODIUM BLD-SCNC: 134 MMOL/L (ref 136–145)
SODIUM BLD-SCNC: 137 MMOL/L (ref 136–145)
SPECIFIC GRAVITY UA: 1.02 (ref 1–1.03)
URINE TYPE: ABNORMAL
UROBILINOGEN, URINE: 0.2 E.U./DL
WBC # BLD: 8.6 K/UL (ref 4–11)
WBC UA: ABNORMAL /HPF (ref 0–5)

## 2021-07-24 PROCEDURE — 1200000000 HC SEMI PRIVATE

## 2021-07-24 PROCEDURE — 99283 EMERGENCY DEPT VISIT LOW MDM: CPT

## 2021-07-24 PROCEDURE — 2580000003 HC RX 258: Performed by: STUDENT IN AN ORGANIZED HEALTH CARE EDUCATION/TRAINING PROGRAM

## 2021-07-24 PROCEDURE — 85025 COMPLETE CBC W/AUTO DIFF WBC: CPT

## 2021-07-24 PROCEDURE — 93005 ELECTROCARDIOGRAM TRACING: CPT | Performed by: STUDENT IN AN ORGANIZED HEALTH CARE EDUCATION/TRAINING PROGRAM

## 2021-07-24 PROCEDURE — 81001 URINALYSIS AUTO W/SCOPE: CPT

## 2021-07-24 PROCEDURE — 6360000002 HC RX W HCPCS: Performed by: STUDENT IN AN ORGANIZED HEALTH CARE EDUCATION/TRAINING PROGRAM

## 2021-07-24 PROCEDURE — 74176 CT ABD & PELVIS W/O CONTRAST: CPT

## 2021-07-24 PROCEDURE — 36415 COLL VENOUS BLD VENIPUNCTURE: CPT

## 2021-07-24 PROCEDURE — 6370000000 HC RX 637 (ALT 250 FOR IP): Performed by: STUDENT IN AN ORGANIZED HEALTH CARE EDUCATION/TRAINING PROGRAM

## 2021-07-24 PROCEDURE — 80069 RENAL FUNCTION PANEL: CPT

## 2021-07-24 RX ORDER — ERGOCALCIFEROL 1.25 MG/1
50000 CAPSULE ORAL WEEKLY
Status: DISCONTINUED | OUTPATIENT
Start: 2021-07-25 | End: 2021-07-27 | Stop reason: HOSPADM

## 2021-07-24 RX ORDER — SODIUM CHLORIDE 0.9 % (FLUSH) 0.9 %
5-40 SYRINGE (ML) INJECTION PRN
Status: DISCONTINUED | OUTPATIENT
Start: 2021-07-24 | End: 2021-07-27 | Stop reason: HOSPADM

## 2021-07-24 RX ORDER — ACETAMINOPHEN 650 MG/1
650 SUPPOSITORY RECTAL EVERY 6 HOURS PRN
Status: DISCONTINUED | OUTPATIENT
Start: 2021-07-24 | End: 2021-07-27 | Stop reason: HOSPADM

## 2021-07-24 RX ORDER — ACETAMINOPHEN 325 MG/1
650 TABLET ORAL EVERY 6 HOURS PRN
Status: DISCONTINUED | OUTPATIENT
Start: 2021-07-24 | End: 2021-07-27 | Stop reason: HOSPADM

## 2021-07-24 RX ORDER — ONDANSETRON 4 MG/1
4 TABLET, ORALLY DISINTEGRATING ORAL EVERY 8 HOURS PRN
Status: DISCONTINUED | OUTPATIENT
Start: 2021-07-24 | End: 2021-07-27 | Stop reason: HOSPADM

## 2021-07-24 RX ORDER — SODIUM CHLORIDE, SODIUM LACTATE, POTASSIUM CHLORIDE, AND CALCIUM CHLORIDE .6; .31; .03; .02 G/100ML; G/100ML; G/100ML; G/100ML
500 INJECTION, SOLUTION INTRAVENOUS ONCE
Status: COMPLETED | OUTPATIENT
Start: 2021-07-24 | End: 2021-07-24

## 2021-07-24 RX ORDER — HEPARIN SODIUM 5000 [USP'U]/ML
5000 INJECTION, SOLUTION INTRAVENOUS; SUBCUTANEOUS EVERY 8 HOURS SCHEDULED
Status: DISCONTINUED | OUTPATIENT
Start: 2021-07-24 | End: 2021-07-27 | Stop reason: HOSPADM

## 2021-07-24 RX ORDER — SODIUM CHLORIDE, SODIUM LACTATE, POTASSIUM CHLORIDE, AND CALCIUM CHLORIDE .6; .31; .03; .02 G/100ML; G/100ML; G/100ML; G/100ML
1000 INJECTION, SOLUTION INTRAVENOUS ONCE
Status: COMPLETED | OUTPATIENT
Start: 2021-07-24 | End: 2021-07-24

## 2021-07-24 RX ORDER — SODIUM CHLORIDE 0.9 % (FLUSH) 0.9 %
5-40 SYRINGE (ML) INJECTION EVERY 12 HOURS SCHEDULED
Status: DISCONTINUED | OUTPATIENT
Start: 2021-07-24 | End: 2021-07-27 | Stop reason: HOSPADM

## 2021-07-24 RX ORDER — ONDANSETRON 4 MG/1
4 TABLET, ORALLY DISINTEGRATING ORAL 3 TIMES DAILY PRN
Status: DISCONTINUED | OUTPATIENT
Start: 2021-07-24 | End: 2021-07-27 | Stop reason: HOSPADM

## 2021-07-24 RX ORDER — OXYCODONE AND ACETAMINOPHEN 7.5; 325 MG/1; MG/1
1 TABLET ORAL EVERY 8 HOURS PRN
Status: DISCONTINUED | OUTPATIENT
Start: 2021-07-24 | End: 2021-07-27 | Stop reason: HOSPADM

## 2021-07-24 RX ORDER — ONDANSETRON 2 MG/ML
4 INJECTION INTRAMUSCULAR; INTRAVENOUS EVERY 6 HOURS PRN
Status: DISCONTINUED | OUTPATIENT
Start: 2021-07-24 | End: 2021-07-27 | Stop reason: HOSPADM

## 2021-07-24 RX ORDER — MIRTAZAPINE 15 MG/1
7.5 TABLET, FILM COATED ORAL NIGHTLY
Status: DISCONTINUED | OUTPATIENT
Start: 2021-07-24 | End: 2021-07-26

## 2021-07-24 RX ORDER — SODIUM CHLORIDE 9 MG/ML
25 INJECTION, SOLUTION INTRAVENOUS PRN
Status: DISCONTINUED | OUTPATIENT
Start: 2021-07-24 | End: 2021-07-27 | Stop reason: HOSPADM

## 2021-07-24 RX ORDER — HYDROXYZINE HYDROCHLORIDE 25 MG/1
50 TABLET, FILM COATED ORAL 3 TIMES DAILY PRN
Status: DISCONTINUED | OUTPATIENT
Start: 2021-07-24 | End: 2021-07-27 | Stop reason: HOSPADM

## 2021-07-24 RX ORDER — VENLAFAXINE HYDROCHLORIDE 75 MG/1
75 CAPSULE, EXTENDED RELEASE ORAL
Status: DISCONTINUED | OUTPATIENT
Start: 2021-07-25 | End: 2021-07-27 | Stop reason: HOSPADM

## 2021-07-24 RX ORDER — CIPROFLOXACIN 500 MG/1
TABLET, FILM COATED ORAL 2 TIMES DAILY
Status: ON HOLD | COMMUNITY
Start: 2021-07-21 | End: 2021-07-27 | Stop reason: HOSPADM

## 2021-07-24 RX ORDER — NICOTINE 21 MG/24HR
1 PATCH, TRANSDERMAL 24 HOURS TRANSDERMAL DAILY
Status: DISCONTINUED | OUTPATIENT
Start: 2021-07-24 | End: 2021-07-27 | Stop reason: HOSPADM

## 2021-07-24 RX ORDER — ATENOLOL 25 MG/1
25 TABLET ORAL DAILY
Status: DISCONTINUED | OUTPATIENT
Start: 2021-07-24 | End: 2021-07-27 | Stop reason: HOSPADM

## 2021-07-24 RX ORDER — SODIUM CHLORIDE 9 MG/ML
INJECTION, SOLUTION INTRAVENOUS CONTINUOUS
Status: DISCONTINUED | OUTPATIENT
Start: 2021-07-24 | End: 2021-07-27 | Stop reason: HOSPADM

## 2021-07-24 RX ADMIN — Medication 10 ML: at 22:54

## 2021-07-24 RX ADMIN — ATENOLOL 25 MG: 25 TABLET ORAL at 22:54

## 2021-07-24 RX ADMIN — SODIUM CHLORIDE: 9 INJECTION, SOLUTION INTRAVENOUS at 22:54

## 2021-07-24 RX ADMIN — SODIUM CHLORIDE, SODIUM LACTATE, POTASSIUM CHLORIDE, AND CALCIUM CHLORIDE 500 ML: .6; .31; .03; .02 INJECTION, SOLUTION INTRAVENOUS at 16:38

## 2021-07-24 RX ADMIN — HEPARIN SODIUM 5000 UNITS: 5000 INJECTION INTRAVENOUS; SUBCUTANEOUS at 21:25

## 2021-07-24 RX ADMIN — SODIUM CHLORIDE, SODIUM LACTATE, POTASSIUM CHLORIDE, AND CALCIUM CHLORIDE 1000 ML: .6; .31; .03; .02 INJECTION, SOLUTION INTRAVENOUS at 17:38

## 2021-07-24 RX ADMIN — OXYCODONE AND ACETAMINOPHEN 1 TABLET: 7.5; 325 TABLET ORAL at 21:25

## 2021-07-24 RX ADMIN — MIRTAZAPINE 7.5 MG: 15 TABLET, FILM COATED ORAL at 22:54

## 2021-07-24 ASSESSMENT — ENCOUNTER SYMPTOMS
SORE THROAT: 0
NAUSEA: 1
COUGH: 0
DIARRHEA: 0
VOMITING: 0
SHORTNESS OF BREATH: 0

## 2021-07-24 ASSESSMENT — PAIN SCALES - GENERAL: PAINLEVEL_OUTOF10: 7

## 2021-07-24 NOTE — ED PROVIDER NOTES
Date of evaluation: 7/24/2021    Chief Complaint   Fatigue (states she had a tumor removed from her urinary sysatem and ever since she is fatigued, states she has been drinking more fluids per MD)      Nursing Notes, Past Medical Hx, Past Surgical Hx, Social Hx, Allergies, and Family Hx were reviewed. History of Present Illness     Pola Turcios is a 80 y.o. female who presents to the ED with fatigue that has been occurring for a week. 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 She also had a PMH of Stage III CKD. She recently was mixing up her medications for a few week, but she began to take them again within the past week. She does not know exactly which medications were getting mixed up, but she knows she was off her Percocet for a few weeks and having some symptoms of withdrawal before she started to take it again. Any exertion makes her fatigue worse. Her power of  stated she has been more fatigued than usual and has had a few more falls than typical in the past 9 months. She lives with a roommate, who is 80. She uses a cane to walk, but has a walker as well. Review of Systems     Review of Systems   Constitutional: Positive for fatigue. Negative for fever. HENT: Negative for congestion and sore throat. Respiratory: Negative for cough and shortness of breath. Cardiovascular: Negative for chest pain and palpitations. Gastrointestinal: Positive for nausea. Negative for diarrhea and vomiting. Genitourinary: Positive for hematuria. Musculoskeletal: Negative for arthralgias and myalgias. Skin: Negative for rash and wound. Neurological: Positive for dizziness. Negative for light-headedness and numbness. Psychiatric/Behavioral: Negative for confusion, decreased concentration and hallucinations.    All other systems OXYCODONE-ACETAMINOPHEN (PERCOCET) 7.5-325 MG PER TABLET    Take 1 tablet by mouth every 8 hours as needed for Pain (chronic back pain. ). Chronic back pain. VENLAFAXINE (EFFEXOR XR) 75 MG EXTENDED RELEASE CAPSULE    TAKE ONE CAPSULE BY MOUTH DAILY    VITAMIN D (ERGOCALCIFEROL) 1.25 MG (24244 UT) CAPS CAPSULE    Take 1 capsule by mouth once a week       Allergies     She is allergic to penicillins. Physical Exam     INITIAL VITALS: BP (!) 104/91   Pulse 55   Temp 98 °F (36.7 °C) (Oral)   Resp 18   SpO2 100%    Physical Exam  Constitutional:       Appearance: Normal appearance. She is normal weight. HENT:      Head: Normocephalic and atraumatic. Nose: Nose normal. No congestion or rhinorrhea. Mouth/Throat:      Mouth: Mucous membranes are moist.      Pharynx: Oropharynx is clear. Cardiovascular:      Rate and Rhythm: Normal rate and regular rhythm. Heart sounds: Normal heart sounds. Pulmonary:      Effort: Pulmonary effort is normal.      Breath sounds: Decreased breath sounds present. Abdominal:      General: Abdomen is flat. Bowel sounds are normal.      Palpations: Abdomen is soft. Musculoskeletal:         General: No swelling. Normal range of motion. Skin:     General: Skin is warm and dry. Neurological:      General: No focal deficit present. Mental Status: She is alert and oriented to person, place, and time.    Psychiatric:         Mood and Affect: Mood normal.         Behavior: Behavior normal.       Extremities: sensation intact B/L in UE and LE          Diagnostic Results     EKG   Interpreted by emergency department physician Deanna Landon MD  Rhythm: sinus bradycardia  Rate: 50-60  Axis: normal  Ectopy: none  Conduction: normal  ST Segments: normal  T Waves: normal  Q Waves: none  Clinical Impression: sinus bradycardia  Comparison:  EKG baseline- 6/26/2021: normal sinus rhythm     RADIOLOGY:  CT ABDOMEN PELVIS WO CONTRAST Additional Contrast? None   Final Result      1. Mild residual right hydronephrosis with a nephroureteral stent in place. Next on   2. Multiple small hypodensities in the kidneys that are not well characterized without contrast. Follow-up renal ultrasound is recommended. 3. Dilation of the common bile duct and pancreatic duct without visible obstructing mass or stone, not significantly changed. Correlate with liver function tests and if clinically indicated further workup with ERCP/ERCP. LABS:   Labs Reviewed   BASIC METABOLIC PANEL - Abnormal; Notable for the following components:       Result Value    Sodium 132 (*)     Potassium 6.3 (*)     CO2 20 (*)     BUN 55 (*)     CREATININE 2.7 (*)     GFR Non- 17 (*)     GFR  21 (*)     All other components within normal limits    Narrative:     Brenda Rivera tel. [id number],  Chemistry results called to and read back by [address] [place] [address], 07/24/2021 13:56,  by [facility]  Performed at: [facility] - [place] [address] [employer]   Phone [phone]   CBC WITH AUTO DIFFERENTIAL - Abnormal; Notable for the following components:    RBC 3.47 (*)     Hemoglobin 10.6 (*)     Hematocrit 32.3 (*)     Lymphocytes Absolute 0.9 (*)     All other components within normal limits    Narrative:     Performed at: [facility] - [place] [address] [place] [address] [address]   Phone [phone]   BASIC METABOLIC PANEL W/ REFLEX TO MG FOR LOW K - Abnormal; Notable for the following components:    Sodium 134 (*)     Potassium reflex Magnesium 5.5 (*)     Glucose 103 (*)     BUN 61 (*)     CREATININE 2.4 (*)     GFR Non- 19 (*)     GFR  24 (*)     All other components within normal limits    Narrative:     Performed at:   [facility] - [place]  [address] [place] [address] [address]   Phone [phone]   URINALYSIS - Abnormal; Notable for the following components:    Blood, Urine LARGE (*)     Protein,  (*)     Leukocyte Esterase, Urine SMALL (*)     All other components within normal limits    Narrative:     Performed at: The [facility]  [address] [facility] [address]   Phone [phone]   MICROSCOPIC URINALYSIS - Abnormal; Notable for the following components:    WBC, UA 6-9 (*)     RBC, UA >100 (*)     Bacteria, UA 2+ (*)     All other components within normal limits    Narrative:     Performed at: The [facility]  [address] [facility] [address]   Phone [phone]       RECENT VITALS:  BP: (!) 104/91, Temp: 98 °F (36.7 °C), Pulse: 55, Resp: 18     Procedures         ED Course     The patient was given the following medications:  Orders Placed This Encounter   Medications    lactated ringers bolus    lactated ringers bolus            CONSULTS:  [facility] Christiano Alejandro is a 80 y.o. female with a PMH of stage 3 CKD and a recent removal of urothelial carcinoma in the right distal ureter, who presents to the ED with fatigue that has been occurring for a week. UA done to observe any signs for a possible post-op UTI. UA did not indicate signs consistent with a UTI. There was blood in her urine, but it does not appear to be related to an infection. Creatinine and potassium was elevated on initial BMP, but specimen was hemolyzed. GFR consistent with stage 4 CKD. Repeat BMP ordered to decide whether or not patient is hyperkalemic. Creatinine is elevated at 2.4 on repeat BMP, from baseline 1.6. Repeat Potassium was 5.5, but was specimen was also hemolyzed and may be falsely increased. Patient has had difficulty urinating and blood in urine, so CT abdomen/pelvis wo contrast ordered to determine if there is some obstruction present that is preventing her from urinating.  CT scan displayed mild residual right hydronephrosis with a nephroureteral stent in place, there was also multiple small hypodensities in the kidney. There does not appear to be any obstruction related to her recent procedure. Patient was given 500 mL fluids as we suspect Acute Kidney Injury. Medicine was consulted and they agreed to admit her for acute kidney injury. They recommended giving her another 1L of fluids. This patient was also evaluated by the attending physician. All care plans were discussed and agreed upon. Clinical Impression     1. Acute kidney injury ([address]        Disposition/Plan     PATIENT REFERRED TO:  No follow-up provider specified.     DISCHARGE MEDICATIONS:  New Prescriptions    No medications on file       97 Meghna Noonan Said, DO  Resident  07/24/21 9777

## 2021-07-24 NOTE — H&P
Internal Medicine  PGY 1  History & Physical      CC Fatigue    History Obtained From:  Patient and friend (POA)    HISTORY OF PRESENT ILLNESS:  Patient is a 80year old female with history of chronic back pain, CKD3, and recent urology procedure for urothelial carcinoma (7/20; cytoscopy, right ureteroscopy with biopsy, laser resection of tumor, right ureteral stent exchange, and retrograde pyelogram) who is presents with worsening fatigue over the last week. Patient states that she has felt fatigued with dizziness over the last week. POA got call from friend that patient was drowsy and brought to ED. POA states that in addition to this, there have been concerns for declining mental status for past few months. Patient sleepy but arousable during interview. She denies any recent illness, fever, chills, chest pain, shortness of breath, n/v/d. She has had hematuria following urological procedure this week. No dysuria or frequency. Also has had trouble concentrating, sleep issues, and decreased appetite. No SI/HI or hallucinations. Patient also complains of a boil on her upper left back that has bothered her, unclear duration but frankly expels puss. Takes aleve and percocet for back pain. In the ED, got 500 ml bolus NS, Cr was 2.7 BUN 55 and UA was notable for large blood. CT abdomen pelvis showed no obstruction but residual hydronephrosis from recent procedure. History is limited as POA not around 24/7 and patient falling asleep during interaction.     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, left hemisphere    HTN (hypertension) 10/2011    Hyperlipidemia     Kidney stones     Neurologic gait dysfunction     Proteinuria 10/2011    Screening mammogram 2010    benign    Ureteral lesion, native, right        Past Surgical History:        Procedure Laterality Date    COLONOSCOPY  2010     Wojciech Proud - recheck 5 yrs    CYSTOSCOPY      CYSTOSCOPY Right 4/21/2021    CYSTOSCOPY, RIGHT URETEROSCOPY, WITH RETROGRADE, RIGHT URETERAL BIOPSY,  RIGHT STENT EXCHANGE performed by Pat Rubio MD at [place] [address] Right 7/20/2021    CYSTOSCOPY, RIGHT URETEROSCOPY WITH BIOPSY / LASER RESECTION OF TUMOR performed by Pat Rubio MD at [facility] ERCP  5/8/2015    sphincter and stent    EYE SURGERY Bilateral     cataract removal    MOUTH SURGERY      WRIST FRACTURE SURGERY Left 12/23/2016    OPEN REDUCTION INTERNAL FIXATION LEFT DISTAL RADIUS FRACTURE       No current facility-administered medications on file prior to encounter. Current Outpatient Medications on File Prior to Encounter   Medication Sig Dispense Refill    ciprofloxacin (CIPRO) 500 MG tablet 2 times daily       Naproxen Sodium (ALEVE) 220 MG CAPS Take 1 capsule by mouth daily      oxyCODONE-acetaminophen (PERCOCET) 7.5-325 MG per tablet Take 1 tablet by mouth every 8 hours as needed for Pain (chronic back pain. ). Chronic back pain. 90 tablet 0    vitamin D (ERGOCALCIFEROL) 1.25 MG (81523 UT) CAPS capsule Take 1 capsule by mouth once a week 4 capsule 0    atenolol (TENORMIN) 25 MG tablet TAKE ONE TABLET BY MOUTH DAILY 30 tablet 2    venlafaxine (EFFEXOR XR) 75 MG extended release capsule TAKE ONE CAPSULE BY MOUTH DAILY (Patient taking differently: nightly TAKE ONE CAPSULE BY MOUTH nightly) 90 capsule 1    hydrOXYzine (ATARAX) 50 MG tablet Take 1 tablet by mouth 3 times daily as needed for Anxiety 90 tablet 3    mirtazapine (REMERON) 7.5 MG tablet Take 1 tablet by mouth nightly 30 tablet 5    ondansetron (ZOFRAN-ODT) 4 MG disintegrating tablet Take 1 tablet by mouth 3 times daily as needed for Nausea or Vomiting 21 tablet 0     Allergies:  Penicillins    Social History:   · TOBACCO:   reports that she has been smoking cigarettes. She started smoking about 63 years ago. She has a 50.00 pack-year smoking history.  She has never used smokeless tobacco.  · ETOH:   reports no history of alcohol use. · DRUGS : denied  · Patient currently lives home with friend (80 y.o.)  ·   Family History:       Problem Relation Age of Onset    Diabetes Mother     Heart Disease Father     Cancer Brother     Kidney Disease Brother        Review of Systems    ROS: A 10 point review of systems was conducted, significant findings as noted in HPI. Physical Exam  Constitutional:       Comments: Lethargic, NAD, awakens to voice and touch, improved through exam   HENT:      Head: Normocephalic and atraumatic. Nose: Nose normal.      Mouth/Throat:      Mouth: Mucous membranes are moist.   Eyes:      Extraocular Movements: Extraocular movements intact. Pupils: Pupils are equal, round, and reactive to light. Cardiovascular:      Rate and Rhythm: Normal rate and regular rhythm. Pulses: Normal pulses. Heart sounds: Normal heart sounds. Pulmonary:      Effort: Pulmonary effort is normal.      Breath sounds: Normal breath sounds. Abdominal:      General: Abdomen is flat. Bowel sounds are normal.      Palpations: Abdomen is soft. Tenderness: There is no abdominal tenderness. Musculoskeletal:         General: No swelling. Normal range of motion. Cervical back: Normal range of motion. Right lower leg: No edema. Left lower leg: No edema. Skin:     General: Skin is warm. Capillary Refill: Capillary refill takes less than 2 seconds. Findings: Lesion (right upper back/neck fluctuant erytematous lesion with jayashree pus) present. Neurological:      General: No focal deficit present. Mental Status: She is oriented to person, place, and time. Cranial Nerves: No cranial nerve deficit.       Comments: Lethargy as above   Psychiatric:         Mood and Affect: Mood normal.       Physical exam:       Vitals:    07/24/21 1915   BP:    Pulse:    Resp:    Temp:    SpO2: 98%    (reviewed in room on monitor/during exam and was NSR, Afebrile, normotensive, RR 16)    DATA:    Labs:  CBC:   Recent Labs     07/24/21  1316   WBC 8.6   HGB 10.6*   HCT 32.3*          BMP:   Recent Labs     07/24/21  1316 07/24/21  1540   * 134*   K 6.3* 5.5*    105   CO2 20* 22   BUN 55* 61*   CREATININE 2.7* 2.4*   GLUCOSE 93 103*       U/A:  Recent Labs     07/24/21  1540   COLORU Yellow   PHUR 6.0   WBCUA 6-9*   RBCUA >100*   BACTERIA 2+*   CLARITYU Clear   SPECGRAV 1.020   LEUKOCYTESUR SMALL*   UROBILINOGEN 0.2   BILIRUBINUR Negative   BLOODU LARGE*   GLUCOSEU Negative       CT ABDOMEN PELVIS WO CONTRAST Additional Contrast? None   Final Result      1. Mild residual right hydronephrosis with a nephroureteral stent in place. Next on   2. Multiple small hypodensities in the kidneys that are not well characterized without contrast. Follow-up renal ultrasound is recommended. 3. Dilation of the common bile duct and pancreatic duct without visible obstructing mass or stone, not significantly changed. Correlate with liver function tests and if clinically indicated further workup with ERCP/ERCP. ASSESSMENT AND PLAN:  Patient is a 80year old female with history of chronic back pain, CKD3, and recent urology procedure for urothelial carcinoma (7/20) who is presents with worsening fatigue over the last week. Fatigue  Unclear etiology, depression could be playing a role. Patient expresses fatigue, sleep changes, decreased appetite, but still able to enjoy things. Normal TSH recently. AOx4. In setting of CHAPO, dehydration and deconditioning likely contributing. Vit D low recently on supplementation. Afebrile, UA clear and HDS so unlikely infectious cause. -   - will monitor  - pt/ot  - continue home atarax, venlafaxine   - tele    CHAPO on CKD3  Cr increased from baseline of 1.6 to 2.4. Likely prerenal. Blood in urine and hydronephrosis with recent procedure could point to obstruction.  Also question of PO intake so may have pre-renal component.  - got 1 L bolus NS, maintenance IVF  - urine studies  - recheck renal  - daily renal  - IOs  - hold olman mason at discharge    Skin lesion  Fluctuant mass  Erythematous, raised and expelling pus on right upper back. Erythema does not extend past raised lesion. Low concern for cellulitis likely just local irritation. Open. - will old off on abx for now  - general surgery consult  - prn pain management APAP  - also has home PRN percocet    ? Hyperkalemia  Hemolyzed samples  - monitor renal panel    Hyponatremia   132 to 134 with 500 NS. - follow 2100 renal    Placement concerns  POA expressed to ED team that there is a concern for patient caring for self. Lives home with friend who is 80  - PT/OT  - social work    HTN  - home atenolol    Chronic anemia  CBC stable  -will monitor    Okay for NRT    Will discuss with attending physician Dr. Mandy Parsons    Code Status: limited  FEN:  Adult diet low k, NPO midnight  PPX: subq heparin  DISPO: Stacey Owens MD  PGY1  7/24/2021,  7:34 PM  Internal Medicine    Addendum to Resident H& P/Progress note:  I have personally seen,examined and evaluated the patient.  I have reviewed the current history, physical findings, labs and assessment and plan and agree with note as documented by resident MD ( Michelle Stokes)      Anel Funez MD, [address]

## 2021-07-24 NOTE — ED PROVIDER NOTES
ED Attending Attestation Note     Date of evaluation: 7/24/2021    This patient was seen by the resident. I have seen and examined the patient, agree with the workup, evaluation, management and diagnosis. The care plan has been discussed. Patient presenting with generalized weakness, malaise. Patient is status post recent resection of a ureteral tumor. Creatinine is elevated from baseline, BUN also elevated. Suspect possibly secondary to dehydration. Will initiate fluid bolus.   CT scan without explanation of symptoms, due to CHAPO as well as general malaise, admit to internal medicine  I agree with residents interpretation of the EKG     Holly Funk MD  07/24/21 2020       Holly Funk MD  07/30/21 4795

## 2021-07-25 LAB
ALBUMIN SERPL-MCNC: 3.3 G/DL (ref 3.4–5)
ANION GAP SERPL CALCULATED.3IONS-SCNC: 6 MMOL/L (ref 3–16)
BASOPHILS ABSOLUTE: 0 K/UL (ref 0–0.2)
BASOPHILS RELATIVE PERCENT: 0.5 %
BUN BLDV-MCNC: 50 MG/DL (ref 7–20)
CALCIUM SERPL-MCNC: 8.4 MG/DL (ref 8.3–10.6)
CHLORIDE BLD-SCNC: 109 MMOL/L (ref 99–110)
CO2: 24 MMOL/L (ref 21–32)
CREAT SERPL-MCNC: 2 MG/DL (ref 0.6–1.2)
CREATININE URINE: 44.7 MG/DL (ref 28–259)
EKG ATRIAL RATE: 55 BPM
EKG DIAGNOSIS: NORMAL
EKG P AXIS: 45 DEGREES
EKG P-R INTERVAL: 136 MS
EKG Q-T INTERVAL: 436 MS
EKG QRS DURATION: 80 MS
EKG QTC CALCULATION (BAZETT): 417 MS
EKG R AXIS: -26 DEGREES
EKG T AXIS: 33 DEGREES
EKG VENTRICULAR RATE: 55 BPM
EOSINOPHILS ABSOLUTE: 0.3 K/UL (ref 0–0.6)
EOSINOPHILS RELATIVE PERCENT: 4.7 %
GFR AFRICAN AMERICAN: 29
GFR NON-AFRICAN AMERICAN: 24
GLUCOSE BLD-MCNC: 88 MG/DL (ref 70–99)
HCT VFR BLD CALC: 30.1 % (ref 36–48)
HEMOGLOBIN: 9.9 G/DL (ref 12–16)
LYMPHOCYTES ABSOLUTE: 1.1 K/UL (ref 1–5.1)
LYMPHOCYTES RELATIVE PERCENT: 16.7 %
MAGNESIUM: 2.6 MG/DL (ref 1.8–2.4)
MCH RBC QN AUTO: 30.4 PG (ref 26–34)
MCHC RBC AUTO-ENTMCNC: 32.8 G/DL (ref 31–36)
MCV RBC AUTO: 92.7 FL (ref 80–100)
MONOCYTES ABSOLUTE: 0.9 K/UL (ref 0–1.3)
MONOCYTES RELATIVE PERCENT: 13.5 %
NEUTROPHILS ABSOLUTE: 4.3 K/UL (ref 1.7–7.7)
NEUTROPHILS RELATIVE PERCENT: 64.6 %
PDW BLD-RTO: 14.5 % (ref 12.4–15.4)
PHOSPHORUS: 4.6 MG/DL (ref 2.5–4.9)
PLATELET # BLD: 236 K/UL (ref 135–450)
PMV BLD AUTO: 9 FL (ref 5–10.5)
POTASSIUM SERPL-SCNC: 5.5 MMOL/L (ref 3.5–5.1)
RBC # BLD: 3.24 M/UL (ref 4–5.2)
SODIUM BLD-SCNC: 139 MMOL/L (ref 136–145)
SODIUM URINE: 51 MMOL/L
WBC # BLD: 6.7 K/UL (ref 4–11)

## 2021-07-25 PROCEDURE — 83735 ASSAY OF MAGNESIUM: CPT

## 2021-07-25 PROCEDURE — 2580000003 HC RX 258: Performed by: STUDENT IN AN ORGANIZED HEALTH CARE EDUCATION/TRAINING PROGRAM

## 2021-07-25 PROCEDURE — 6370000000 HC RX 637 (ALT 250 FOR IP): Performed by: STUDENT IN AN ORGANIZED HEALTH CARE EDUCATION/TRAINING PROGRAM

## 2021-07-25 PROCEDURE — 1200000000 HC SEMI PRIVATE

## 2021-07-25 PROCEDURE — 0JD40ZZ EXTRACTION OF RIGHT NECK SUBCUTANEOUS TISSUE AND FASCIA, OPEN APPROACH: ICD-10-PCS | Performed by: HOSPITALIST

## 2021-07-25 PROCEDURE — 99222 1ST HOSP IP/OBS MODERATE 55: CPT | Performed by: HOSPITALIST

## 2021-07-25 PROCEDURE — 84300 ASSAY OF URINE SODIUM: CPT

## 2021-07-25 PROCEDURE — 2500000003 HC RX 250 WO HCPCS

## 2021-07-25 PROCEDURE — 87070 CULTURE OTHR SPECIMN AEROBIC: CPT

## 2021-07-25 PROCEDURE — 87081 CULTURE SCREEN ONLY: CPT

## 2021-07-25 PROCEDURE — 6360000002 HC RX W HCPCS: Performed by: STUDENT IN AN ORGANIZED HEALTH CARE EDUCATION/TRAINING PROGRAM

## 2021-07-25 PROCEDURE — 85025 COMPLETE CBC W/AUTO DIFF WBC: CPT

## 2021-07-25 PROCEDURE — 87205 SMEAR GRAM STAIN: CPT

## 2021-07-25 PROCEDURE — 82570 ASSAY OF URINE CREATININE: CPT

## 2021-07-25 PROCEDURE — 99221 1ST HOSP IP/OBS SF/LOW 40: CPT | Performed by: SURGERY

## 2021-07-25 PROCEDURE — 36415 COLL VENOUS BLD VENIPUNCTURE: CPT

## 2021-07-25 PROCEDURE — 80069 RENAL FUNCTION PANEL: CPT

## 2021-07-25 RX ORDER — DOXYCYCLINE 50 MG/1
100 CAPSULE ORAL EVERY 12 HOURS SCHEDULED
Status: DISCONTINUED | OUTPATIENT
Start: 2021-07-25 | End: 2021-07-27 | Stop reason: HOSPADM

## 2021-07-25 RX ORDER — CEPHALEXIN 250 MG/1
250 CAPSULE ORAL EVERY 6 HOURS SCHEDULED
Status: DISCONTINUED | OUTPATIENT
Start: 2021-07-25 | End: 2021-07-25

## 2021-07-25 RX ORDER — LIDOCAINE HYDROCHLORIDE AND EPINEPHRINE 10; 10 MG/ML; UG/ML
20 INJECTION, SOLUTION INFILTRATION; PERINEURAL ONCE
Status: COMPLETED | OUTPATIENT
Start: 2021-07-25 | End: 2021-07-25

## 2021-07-25 RX ORDER — CEPHALEXIN 250 MG/1
500 CAPSULE ORAL EVERY 8 HOURS SCHEDULED
Status: DISCONTINUED | OUTPATIENT
Start: 2021-07-25 | End: 2021-07-25

## 2021-07-25 RX ADMIN — SODIUM CHLORIDE: 9 INJECTION, SOLUTION INTRAVENOUS at 19:45

## 2021-07-25 RX ADMIN — DOXYCYCLINE 100 MG: 50 CAPSULE ORAL at 20:35

## 2021-07-25 RX ADMIN — Medication 10 ML: at 20:36

## 2021-07-25 RX ADMIN — MIRTAZAPINE 7.5 MG: 15 TABLET, FILM COATED ORAL at 20:35

## 2021-07-25 RX ADMIN — HEPARIN SODIUM 5000 UNITS: 5000 INJECTION INTRAVENOUS; SUBCUTANEOUS at 21:05

## 2021-07-25 RX ADMIN — Medication 20 ML: at 10:10

## 2021-07-25 RX ADMIN — LIDOCAINE HYDROCHLORIDE,EPINEPHRINE BITARTRATE 20 ML: 10; .01 INJECTION, SOLUTION INFILTRATION; PERINEURAL at 10:07

## 2021-07-25 RX ADMIN — OXYCODONE AND ACETAMINOPHEN 1 TABLET: 7.5; 325 TABLET ORAL at 19:45

## 2021-07-25 RX ADMIN — ERGOCALCIFEROL 50000 UNITS: 1.25 CAPSULE ORAL at 10:05

## 2021-07-25 RX ADMIN — SODIUM CHLORIDE: 9 INJECTION, SOLUTION INTRAVENOUS at 10:08

## 2021-07-25 RX ADMIN — VENLAFAXINE HYDROCHLORIDE 75 MG: 75 CAPSULE, EXTENDED RELEASE ORAL at 10:05

## 2021-07-25 RX ADMIN — HEPARIN SODIUM 5000 UNITS: 5000 INJECTION INTRAVENOUS; SUBCUTANEOUS at 06:52

## 2021-07-25 RX ADMIN — CEPHALEXIN 500 MG: 250 CAPSULE ORAL at 11:33

## 2021-07-25 RX ADMIN — ACETAMINOPHEN 650 MG: 325 TABLET ORAL at 14:14

## 2021-07-25 RX ADMIN — HEPARIN SODIUM 5000 UNITS: 5000 INJECTION INTRAVENOUS; SUBCUTANEOUS at 14:14

## 2021-07-25 RX ADMIN — OXYCODONE AND ACETAMINOPHEN 1 TABLET: 7.5; 325 TABLET ORAL at 10:05

## 2021-07-25 ASSESSMENT — PAIN DESCRIPTION - PAIN TYPE: TYPE: ACUTE PAIN

## 2021-07-25 ASSESSMENT — PAIN DESCRIPTION - LOCATION: LOCATION: BACK;NECK

## 2021-07-25 ASSESSMENT — PAIN SCALES - GENERAL
PAINLEVEL_OUTOF10: 8
PAINLEVEL_OUTOF10: 7
PAINLEVEL_OUTOF10: 3
PAINLEVEL_OUTOF10: 0
PAINLEVEL_OUTOF10: 7

## 2021-07-25 NOTE — PROGRESS NOTES
Progress Note    Admit Date: 7/24/2021  Day: 1  Diet: ADULT DIET; Regular    CC: Fatigue    Interval history: No acute events overnight. Patient complains of back pain this morning. Surgery performed incision and drainage on infected sebaceous cyst at bedside and recommended 5-day course of cephalexin. ROS: Denies fever/chills, SOB, chest pain, abdo pain, LE pain or swelling, dysuria, no diarrhea, no vision changes, no hearing change, no difficulty swallowing, no numbness or tingling.    +constipation, +headache, +fatigue     HPI: Patient is a 80year old female with history of chronic back pain, CKD3, and recent urology procedure for urothelial carcinoma (7/20; cytoscopy, right ureteroscopy with biopsy, laser resection of tumor, right ureteral stent exchange, and retrograde pyelogram) who is presents with worsening fatigue over the last week.      Medications:     Scheduled Meds:   lidocaine-EPINEPHrine  20 mL Intradermal Once    cephALEXin  250 mg Oral 4 times per day    atenolol  25 mg Oral Daily    mirtazapine  7.5 mg Oral Nightly    venlafaxine  75 mg Oral Daily with breakfast    vitamin D  50,000 Units Oral Weekly    sodium chloride flush  5-40 mL Intravenous 2 times per day    nicotine  1 patch Transdermal Daily    heparin (porcine)  5,000 Units Subcutaneous 3 times per day     Continuous Infusions:   sodium chloride      sodium chloride 125 mL/hr at 07/24/21 2254     PRN Meds:hydrOXYzine, ondansetron, oxyCODONE-acetaminophen, sodium chloride flush, sodium chloride, ondansetron **OR** ondansetron, acetaminophen **OR** acetaminophen    Objective:   Vitals:   T-max:  Patient Vitals for the past 8 hrs:   BP Temp Temp src Pulse Resp SpO2   07/25/21 0738 (!) 115/50 97.4 °F (36.3 °C) Oral 53 16 97 %   07/25/21 0251 (!) 94/52 97.6 °F (36.4 °C) Oral 58 14 94 %       Intake/Output Summary (Last 24 hours) at 7/25/2021 0953  Last data filed at 7/25/2021 0251  Gross per 24 hour   Intake    Output 500 ml Net -500 ml       Review of Systems  ROS negative otherwise stated in HPI. Physical Exam  Constitutional:       Comments: AxOx3, lying comfortably in bed   HENT:      Head: Normocephalic and atraumatic. Nose: Nose normal.      Mouth/Throat:      Mouth: Mucous membranes are moist.   Eyes:      Extraocular Movements: Extraocular movements intact. Pupils: Pupils are equal, round, and reactive to light. Cardiovascular:      Rate and Rhythm: Normal rate and regular rhythm, heart sounds faint but s1/s2 auscultated      Pulses: 2+ peripheral pulses  Pulmonary:      Effort: Pulmonary effort is nonlabored     Breath sounds: CTAB, mildly decreased breath sounds b/l   Abdominal:      General: Abdomen is flat. Bowel sounds are normal.      Palpations: Abdomen is soft. Tenderness: There is no abdominal tenderness. Musculoskeletal:         General: No swelling. Normal range of motion. Cervical back: Normal range of motion. Right lower leg: No edema. Left lower leg: No edema. Skin:     General: Skin is warm. Capillary Refill: Capillary refill takes less than 2 seconds. Findings: Lanced lesion packed with gauze and covered by dressing on R upper back   Neurological:      General: No focal deficit present. Mental Status: She is oriented to person, place, and time. Cranial Nerves: No cranial nerve deficit.     Psychiatric:         Mood and Affect: Mood normal. Speech normal.     LABS:    CBC:   Recent Labs     07/24/21  1316 07/25/21  0418   WBC 8.6 6.7   HGB 10.6* 9.9*   HCT 32.3* 30.1*    236   MCV 93.0 92.7     Renal:    Recent Labs     07/24/21  1540 07/24/21  2153 07/25/21  0418   * 137 139   K 5.5* 5.4* 5.5*    105 109   CO2 22 23 24   BUN 61* 49* 50*   CREATININE 2.4* 2.2* 2.0*   GLUCOSE 103* 114* 88   CALCIUM 8.6 8.4 8.4   MG  --   --  2.60*   PHOS  --  4.2 4.6   ANIONGAP 7 9 6     Hepatic:   Recent Labs     07/24/21 2153 07/25/21  0418   LABALBU 3.5 3.3*     -----------------------------------------------------------------  RAD:   CT ABDOMEN PELVIS WO CONTRAST Additional Contrast? None   Final Result      1. Mild residual right hydronephrosis with a nephroureteral stent in place. Next on   2. Multiple small hypodensities in the kidneys that are not well characterized without contrast. Follow-up renal ultrasound is recommended. 3. Dilation of the common bile duct and pancreatic duct without visible obstructing mass or stone, not significantly changed. Correlate with liver function tests and if clinically indicated further workup with ERCP/ERCP. Assessment/Plan:   Patient is a 80year old female with history of chronic back pain, CKD3, and recent urology procedure for urothelial carcinoma (7/20) who is presents with worsening fatigue over the last week.     Fatigue  Unclear etiology, depression or decreased oral intake could be playing a role. Patient expresses fatigue, sleep changes, decreased appetite, but still able to enjoy things. Normal TSH in June 2021. AOx4. In setting of CHAPO, dehydration and deconditioning likely contributing as well. Vit D low recently on supplementation. Afebrile, UA clear and HDS so unlikely infectious cause. - IVF 125ml/hr NS   - will monitor  - PT/OT  - continue home atarax, venlafaxine   - tele     CHAPO on CKD3  Cr increased from baseline of 1.6 to 2.4. Likely prerenal. Blood in urine and hydronephrosis with recent procedure could point to obstruction. Also question of PO intake so may have pre-renal component.  - got 1 L bolus NS, maintenance IVF as above  - urine studies: large blood, 100 protein, small leuk esterase  - daily renal  - IOs  - holding aleve, dc at discharge     Infected sebaceous cyst   Erythematous, raised and expelling pus on right upper back.  Erythema does not extend past raised lesion.  - general surgery performed I and D this morning.   - F/U culture   - doxycycline for MRSA coverage, 100 mg BID - prn pain management APAP  - also has home PRN percocet     Hyperkalemia  Chronic, will monitor.    - low potassium diet      Placement concerns  POA expressed to ED team that there is a concern for patient caring for self. Lives home with friend who is 82  - PT/OT  - social work     HTN  - home atenolol     Chronic anemia  CBC stable  -will monitor       Code Status: limited  FEN:  Adult diet low k  PPX: subq heparin  DISPO: [facility]    Mitzi Jiang MD, PGY-1  07/25/21  9:53 AM    This patient has been staffed and discussed with Diamante Hicks MD.     Addendum to Resident H& P/Progress note:  I have personally seen,examined and evaluated the patient.  I have reviewed the current history, physical findings, labs and assessment and plan and agree with note as documented by resident MD ( Sanjay Washington)      Diamante Hicks MD, [facility]

## 2021-07-25 NOTE — CONSULTS
General Surgery   Resident Consult Note    Reason for Consult: Boil     History of Present Illness:   Rodolfo Castro is a 80 y.o. female with history of chronic back pain, CKD3, and recent urology procedure for urothelial carcinoma (cytoscopy, right ureteroscopy with biopsy, laser resection of tumor, right ureteral stent exchange, and retrograde pyelogram, 7/20) who is presents with worsening fatigue over the last week. Patient states that she has felt fatigued with dizziness over the last week. General surgery consulted for evaluation of boil on the back of her neck. She states it has been present for the past couple of weeks. Denies drainage, \"sore\". No previous hx of boils at this site or elsewhere. Past Medical History:        Diagnosis Date    Anxiety     Colon polyps     check q5yrs    CVA (cerebral vascular accident) ([facility] 10/12/2018    Lacunar, left hemisphere    HTN (hypertension) 10/2011    Hyperlipidemia     Kidney stones     Neurologic gait dysfunction     Proteinuria 10/2011    Screening mammogram 2010    benign    Ureteral lesion, native, right        Past Surgical History:           Procedure Laterality Date    COLONOSCOPY  2010    Dr. Xavi Rodriguez - recheck 5 yrs    CYSTOSCOPY      CYSTOSCOPY Right 4/21/2021    CYSTOSCOPY, RIGHT URETEROSCOPY, WITH RETROGRADE, RIGHT URETERAL BIOPSY,  RIGHT STENT EXCHANGE performed by Neda Remy MD at [address] [place] [address] Right 7/20/2021    CYSTOSCOPY, RIGHT URETEROSCOPY WITH BIOPSY / LASER RESECTION OF TUMOR performed by Neda Remy MD at [address] [place] [address] ERCP  5/8/2015    sphincter and stent    EYE SURGERY Bilateral     cataract removal    MOUTH SURGERY      WRIST FRACTURE SURGERY Left 12/23/2016    OPEN REDUCTION INTERNAL FIXATION LEFT DISTAL RADIUS FRACTURE       Allergies:  Penicillins    Medications:   Home Meds  No current facility-administered medications on file prior to encounter.      Current Outpatient Medications on File Prior to Encounter   Medication Sig Dispense Refill    ciprofloxacin (CIPRO) 500 MG tablet 2 times daily       Naproxen Sodium (ALEVE) 220 MG CAPS Take 1 capsule by mouth daily      oxyCODONE-acetaminophen (PERCOCET) 7.5-325 MG per tablet Take 1 tablet by mouth every 8 hours as needed for Pain (chronic back pain. ). Chronic back pain.  90 tablet 0    vitamin D (ERGOCALCIFEROL) 1.25 MG (18282 UT) CAPS capsule Take 1 capsule by mouth once a week 4 capsule 0    atenolol (TENORMIN) 25 MG tablet TAKE ONE TABLET BY MOUTH DAILY 30 tablet 2    venlafaxine (EFFEXOR XR) 75 MG extended release capsule TAKE ONE CAPSULE BY MOUTH DAILY (Patient taking differently: nightly TAKE ONE CAPSULE BY MOUTH nightly) 90 capsule 1    hydrOXYzine (ATARAX) 50 MG tablet Take 1 tablet by mouth 3 times daily as needed for Anxiety 90 tablet 3    mirtazapine (REMERON) 7.5 MG tablet Take 1 tablet by mouth nightly 30 tablet 5    ondansetron (ZOFRAN-ODT) 4 MG disintegrating tablet Take 1 tablet by mouth 3 times daily as needed for Nausea or Vomiting 21 tablet 0       Current Meds  atenolol (TENORMIN) tablet 25 mg, Daily  hydrOXYzine (ATARAX) tablet 50 mg, TID PRN  mirtazapine (REMERON) tablet 7.5 mg, Nightly  ondansetron (ZOFRAN-ODT) disintegrating tablet 4 mg, TID PRN  oxyCODONE-acetaminophen (PERCOCET) 7.5-325 MG per tablet 1 tablet, Q8H PRN  venlafaxine (EFFEXOR XR) extended release capsule 75 mg, Daily with breakfast  vitamin D (ERGOCALCIFEROL) capsule 50,000 Units, Weekly  sodium chloride flush 0.9 % injection 5-40 mL, 2 times per day  sodium chloride flush 0.9 % injection 5-40 mL, PRN  0.9 % sodium chloride infusion, PRN  ondansetron (ZOFRAN-ODT) disintegrating tablet 4 mg, Q8H PRN   Or  ondansetron (ZOFRAN) injection 4 mg, Q6H PRN  acetaminophen (TYLENOL) tablet 650 mg, Q6H PRN   Or  acetaminophen (TYLENOL) suppository 650 mg, Q6H PRN  nicotine (NICODERM CQ) 14 MG/24HR 1 patch, Daily  heparin (porcine) injection 5,000 Units, 3 times per day  0.9 % sodium chloride infusion, Continuous        Family History:   Family History   Problem Relation Age of Onset    Diabetes Mother     Heart Disease Father     Cancer Brother     Kidney Disease Brother        Social History:   TOBACCO:   reports that she has been smoking cigarettes. She started smoking about 63 years ago. She has a 50.00 pack-year smoking history. She has never used smokeless tobacco.  ETOH:   reports no history of alcohol use. DRUGS:   reports no history of drug use. ROS: A 14 point review of systems was conducted, significant findings as noted in HPI. All other systems negative. Physical exam:    Vitals:    07/24/21 2014 07/24/21 2224 07/25/21 0251 07/25/21 0738   BP: 126/65  (!) 94/52 (!) 115/50   Pulse: 64  58 53   Resp: 16  14 16   Temp: 97.7 °F (36.5 °C)  97.6 °F (36.4 °C) 97.4 °F (36.3 °C)   TempSrc: Temporal  Oral Oral   SpO2: 100%  94% 97%   Weight:  125 lb (56.7 kg)     Height:  5' 1\" (1.549 m)         General appearance: alert, no acute distress, grooming appropriate  Eyes: no gross deficits  Neck: trachea midline, posterior - large erythematous fluctuant mass, punctate opening with purulence expressed   Chest/Lungs: normal effort, no accessory muscle use, on RA  Cardiovascular: RRR  Abdomen: soft, non-tender, non-distended, no guarding/rigidity  Skin: warm and dry, no rashes  Extremities: no edema, no cyanosis  Neuro: A&Ox3, no focal deficits, sensation intact    Labs:    CBC:   Recent Labs     07/24/21  1316 07/25/21  0418   WBC 8.6 6.7   HGB 10.6* 9.9*   HCT 32.3* 30.1*   MCV 93.0 92.7    236     BMP:   Recent Labs     07/24/21  1540 07/24/21  2153 07/25/21  0418   * 137 139   K 5.5* 5.4* 5.5*    105 109   CO2 22 23 24   PHOS  --  4.2 4.6   BUN 61* 49* 50*   CREATININE 2.4* 2.2* 2.0*     PT/INR: No results for input(s): PROTIME, INR in the last 72 hours. APTT: No results for input(s): APTT in the last 72 hours.   Liver Profile:  Lab Results   Component Value Date    AST 16 06/26/2021    ALT 12 06/26/2021    BILIDIR <0.2 06/26/2021    BILITOT <0.2 06/26/2021    ALKPHOS 74 06/26/2021     Lab Results   Component Value Date    CHOL 222 11/25/2020    HDL 66 11/25/2020    TRIG 132 11/25/2020     UA:   Lab Results   Component Value Date    COLORU Yellow 07/24/2021    PHUR 6.0 07/24/2021    LABCAST 10-20 Hyaline 01/19/2015    WBCUA 6-9 07/24/2021    RBCUA >100 07/24/2021    MUCUS 1+ 01/19/2015    BACTERIA 2+ 07/24/2021    CLARITYU Clear 07/24/2021    SPECGRAV 1.020 07/24/2021    LEUKOCYTESUR SMALL 07/24/2021    UROBILINOGEN 0.2 07/24/2021    BILIRUBINUR Negative 07/24/2021    BILIRUBINUR Negative 10/07/2011    BLOODU LARGE 07/24/2021    GLUCOSEU Negative 07/24/2021    GLUCOSEU Negative 10/07/2011    AMORPHOUS 2+ 06/26/2021       Imaging:   CT ABDOMEN PELVIS WO CONTRAST Additional Contrast? None   Final Result      1. Mild residual right hydronephrosis with a nephroureteral stent in place. Next on   2. Multiple small hypodensities in the kidneys that are not well characterized without contrast. Follow-up renal ultrasound is recommended. 3. Dilation of the common bile duct and pancreatic duct without visible obstructing mass or stone, not significantly changed. Correlate with liver function tests and if clinically indicated further workup with ERCP/ERCP. Assessment/Plan: This is a 80 y.o. female with posterior neck abscess, now draining. She is afebrile, hemodynamically stable, no leukocytosis, here for dizziness and confusion.    - patient will need formal I&D bedside and short course of abx for cellulitis around the area. - will get consent  - continue medical management per primary   - will discuss with Dr Carlos A Garner on rounds.      Cherelle Bob MD  Surgical Resident PGY 4   07/25/21  7:55 AM  [phone]

## 2021-07-25 NOTE — CARE COORDINATION
CM following, from home with friends, will DC home with [address] [place] [address] for wound care.   Electronically signed by Pete Rios RN on 7/25/2021 at 10:27 AM  [phone]

## 2021-07-25 NOTE — PROGRESS NOTES
Incision and Drainage Note    Type of Incision and Drainage:     [x] Simple    [] Complex     [] Hematoma/Seroma  [] Abscess soft tissue deep       After informed consent was obtained,the posterior neck was prepped and draped in the usual sterile fashion. 1% lidocaine with epinephrine was injected to the affected area. # 11 scalpel was used to create a 3 cm incision over the area of greatest fluctuance. Approximately 5cc mL sanguinopurulent sebaceous contents were expressed and cultures obtained. Loculations were broken up using blunt dissection. Wound flushed with saline and packed with  iodoform guaze. Hemostasis was achieved. Dressing was applied. Patient tolerated procedure well without any immediate complications. Consistent with infected sebaceous cyst, attempted to core out capsule, however, discussed with patient possible recurrence and may need surgical excision at a later time when infection resolves if recurs. Patient in agreement.        Bulmaro Ya MD  07/25/21  9:12 AM  [phone]

## 2021-07-25 NOTE — PROGRESS NOTES
Pt ao4, vss. C/o pain on the \"boil\" back of the neck, pt is given prn percocet. Denied n/v,sob. Pt calls out appropriately for assistance. RN noticed pink tinged urine output and pt admitted that was going on since surgery on Tuesday. Pt in bed w bed alarm on and call light in reach.  Will continue to monitor

## 2021-07-25 NOTE — DISCHARGE SUMMARY
INTERNAL MEDICINE DEPARTMENT AT [address] [place] [address]  DISCHARGE SUMMARY    Patient ID: Lady Hinds                                             Discharge Date: 7/27/2021   Patient's PCP: Yolette Ellis MD                                          Discharge Physician: Mragot Sumner MD  Admit Date: 7/24/2021   Admitting Physician: Renay Heaton MD    PROBLEMS DURING HOSPITALIZATION:  Present on Admission:   CHAPO [facility]acute kidney [facility]) [facility]   Acute kidney injury ([facility]   Neck abscess   Chronic fatigue      DISCHARGE DIAGNOSES:    HPI: Mrs. Kalee Ambrocio is a 80year old female with history of chronic back pain, CKD3, and recent urology procedure for urothelial carcinoma (7/20; cytoscopy, right ureteroscopy with biopsy, laser resection of tumor, right ureteral stent exchange, and retrograde pyelogram) who presented on 7/24/21 with worsening fatigue over the last week. Patient stated that she has had fatigue with dizziness over the last week. POA got call from friend that patient was drowsy and brought to ED. POA states that in addition to this, there have been concerns for declining mental status for past few months. She denies any recent illness, fever, chills, chest pain, shortness of breath, n/v/d. She has had hematuria following urological procedure this week. No dysuria or frequency. Also has had trouble concentrating, sleep issues, and decreased appetite. No SI/HI or hallucinations. Takes aleve and percocet for back pain.     In the ED, got 500 ml bolus NS, Cr was 2.7 BUN 55 and UA was notable for large blood. CT abdomen pelvis showed no obstruction but residual hydronephrosis from recent procedure. Of note patient also presented with infected sebaceous cyst but had no signs of systemic infection. The following issues were addressed during hospitalization:    Fatigue  Unclear etiology, depression or decreased oral intake could be culprit.  Patient expresses fatigue, sleep changes, decreased appetite, but still able to enjoy things. Normal TSH in June 2021. AOx4. In setting of CHAPO, dehydration and deconditioning likely contributing as well. Vit D low recently on supplementation. Afebrile, UA clear and HDS so unlikely infectious cause. - Fluid resuscitation and PO intake improved energy level   - PT/OT: scored 22/24 on PT AM-PAC, 21/24 on OT AM-PAC   - continue home atarax, venlafaxine        CHAPO on CKD3  Cr increased from baseline of 1.6 to 2.4. Likely prerenal. Also questionable PO intake which could contribute to a pre-renal etiology. - fluid resuscitation with improvement in creatinine to 1.6  - urine studies: large blood, 100 protein, small leuk esterase likely from recent urological procedure   - held aleve, discontinue at discharge     Infected sebaceous cyst   Erythematous, raised and expelling pus on right upper back. Erythema does not extend past raised lesion.  - general surgery performed I and D   - culture:  No growth to date   - doxycycline for MRSA coverage, 100 mg BID for 10 days   - prn pain management APAP  - home PRN percocet     Hyperkalemia  Chronic, will monitor.    - low potassium diet      Placement concerns  POA expressed to ED team that there is a concern for patient caring for self. Lives home with friend who is 80  - PT/OT scored well on AM-PAC   - social work: concerns for ability to care for self at home. 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 She would prefer to go to SNF to regain strength before going home.      HTN  - home atenolol 25 mg daily      Chronic anemia  CBC stable hgb about 10. Iron studies June 2021.   -Iron 30, ferritin 226, TIBC 240, folate 5.45, B12 251  - consistent with anemia of chronic disease       Physical Exam:  /63   Pulse 60   Temp 98.1 °F (36.7 °C) (Oral)   Resp 16   Ht 5' 1\" (1.549 m)   Wt 125 lb (56.7 kg)   SpO2 95%   BMI 23.62 kg/m²       Consults: general surgery  Significant Diagnostic Studies:  CTAP  Treatments: IV directed    Time Spent on discharge is more than 30 minutes    Signed:  Nayla Pope MD,  PGY-1  7/27/2021     Addendum to Resident H& P/Progress note:  I have personally seen,examined and evaluated the patient.  I have reviewed the current history, physical findings, labs and assessment and plan and agree with note as documented by resident MD ( Juan José Frankel)      Shavonne Persaud MD, [address]

## 2021-07-25 NOTE — PROGRESS NOTES
4 Eyes Admission Assessment     I agree as the admission nurse that 2 RN's have performed a thorough Head to Toe Skin Assessment on the patient. ALL assessment sites listed below have been assessed on admission.        Areas assessed by both nurses:   [x]   Head, Face, and Ears   [x]   Shoulders, Back, and Chest  [x]   Arms, Elbows, and Hands   [x]   Coccyx, Sacrum, and Ischium  [x]   Legs, Feet, and Heels  Abrasion/scab L shin; boil posterior of the neck      Does the Patient have Skin Breakdown? no        Nicolás Prevention initiated:  no  Wound Care Orders initiated:  no      [facility] nurse consulted for Pressure Injury (Stage 3,4, Unstageable, DTI, NWPT, and Complex wounds) or Nicolás score 18 or lower: no      Nurse 1 eSignature:Electronically signed by Gauri Dai RN on 7/25/2021 at 2:59 AM      Nurse 2 eSignature: Electronically signed by Ricarda Tony RN on 7/25/21 at 3:05 AM EDT

## 2021-07-26 LAB
ALBUMIN SERPL-MCNC: 3 G/DL (ref 3.4–5)
ANION GAP SERPL CALCULATED.3IONS-SCNC: 9 MMOL/L (ref 3–16)
BASOPHILS ABSOLUTE: 0 K/UL (ref 0–0.2)
BASOPHILS RELATIVE PERCENT: 0.4 %
BUN BLDV-MCNC: 37 MG/DL (ref 7–20)
CALCIUM SERPL-MCNC: 7.6 MG/DL (ref 8.3–10.6)
CHLORIDE BLD-SCNC: 113 MMOL/L (ref 99–110)
CO2: 18 MMOL/L (ref 21–32)
CREAT SERPL-MCNC: 1.6 MG/DL (ref 0.6–1.2)
EOSINOPHILS ABSOLUTE: 0.2 K/UL (ref 0–0.6)
EOSINOPHILS RELATIVE PERCENT: 3.4 %
GFR AFRICAN AMERICAN: 38
GFR NON-AFRICAN AMERICAN: 31
GLUCOSE BLD-MCNC: 84 MG/DL (ref 70–99)
HCT VFR BLD CALC: 28.3 % (ref 36–48)
HEMOGLOBIN: 9.4 G/DL (ref 12–16)
LYMPHOCYTES ABSOLUTE: 1.3 K/UL (ref 1–5.1)
LYMPHOCYTES RELATIVE PERCENT: 19.9 %
MAGNESIUM: 2.3 MG/DL (ref 1.8–2.4)
MCH RBC QN AUTO: 30.3 PG (ref 26–34)
MCHC RBC AUTO-ENTMCNC: 33.1 G/DL (ref 31–36)
MCV RBC AUTO: 91.4 FL (ref 80–100)
MONOCYTES ABSOLUTE: 0.8 K/UL (ref 0–1.3)
MONOCYTES RELATIVE PERCENT: 12.6 %
NEUTROPHILS ABSOLUTE: 4.2 K/UL (ref 1.7–7.7)
NEUTROPHILS RELATIVE PERCENT: 63.7 %
PDW BLD-RTO: 14.3 % (ref 12.4–15.4)
PHOSPHORUS: 3.8 MG/DL (ref 2.5–4.9)
PLATELET # BLD: 214 K/UL (ref 135–450)
PMV BLD AUTO: 8.7 FL (ref 5–10.5)
POTASSIUM SERPL-SCNC: 4.9 MMOL/L (ref 3.5–5.1)
RBC # BLD: 3.1 M/UL (ref 4–5.2)
SODIUM BLD-SCNC: 140 MMOL/L (ref 136–145)
WBC # BLD: 6.6 K/UL (ref 4–11)

## 2021-07-26 PROCEDURE — 97530 THERAPEUTIC ACTIVITIES: CPT

## 2021-07-26 PROCEDURE — 6360000002 HC RX W HCPCS: Performed by: STUDENT IN AN ORGANIZED HEALTH CARE EDUCATION/TRAINING PROGRAM

## 2021-07-26 PROCEDURE — 97165 OT EVAL LOW COMPLEX 30 MIN: CPT

## 2021-07-26 PROCEDURE — 97116 GAIT TRAINING THERAPY: CPT

## 2021-07-26 PROCEDURE — 97535 SELF CARE MNGMENT TRAINING: CPT

## 2021-07-26 PROCEDURE — 85025 COMPLETE CBC W/AUTO DIFF WBC: CPT

## 2021-07-26 PROCEDURE — 97161 PT EVAL LOW COMPLEX 20 MIN: CPT

## 2021-07-26 PROCEDURE — 99232 SBSQ HOSP IP/OBS MODERATE 35: CPT | Performed by: HOSPITALIST

## 2021-07-26 PROCEDURE — 2580000003 HC RX 258: Performed by: STUDENT IN AN ORGANIZED HEALTH CARE EDUCATION/TRAINING PROGRAM

## 2021-07-26 PROCEDURE — 6370000000 HC RX 637 (ALT 250 FOR IP)

## 2021-07-26 PROCEDURE — 80069 RENAL FUNCTION PANEL: CPT

## 2021-07-26 PROCEDURE — 1200000000 HC SEMI PRIVATE

## 2021-07-26 PROCEDURE — 36415 COLL VENOUS BLD VENIPUNCTURE: CPT

## 2021-07-26 PROCEDURE — 83735 ASSAY OF MAGNESIUM: CPT

## 2021-07-26 PROCEDURE — 6370000000 HC RX 637 (ALT 250 FOR IP): Performed by: STUDENT IN AN ORGANIZED HEALTH CARE EDUCATION/TRAINING PROGRAM

## 2021-07-26 RX ORDER — MIRTAZAPINE 15 MG/1
15 TABLET, FILM COATED ORAL NIGHTLY
Status: DISCONTINUED | OUTPATIENT
Start: 2021-07-26 | End: 2021-07-27 | Stop reason: HOSPADM

## 2021-07-26 RX ADMIN — Medication 10 ML: at 08:28

## 2021-07-26 RX ADMIN — OXYCODONE AND ACETAMINOPHEN 1 TABLET: 7.5; 325 TABLET ORAL at 06:19

## 2021-07-26 RX ADMIN — MIRTAZAPINE 15 MG: 15 TABLET, FILM COATED ORAL at 21:26

## 2021-07-26 RX ADMIN — OXYCODONE AND ACETAMINOPHEN 1 TABLET: 7.5; 325 TABLET ORAL at 23:16

## 2021-07-26 RX ADMIN — HEPARIN SODIUM 5000 UNITS: 5000 INJECTION INTRAVENOUS; SUBCUTANEOUS at 21:26

## 2021-07-26 RX ADMIN — HEPARIN SODIUM 5000 UNITS: 5000 INJECTION INTRAVENOUS; SUBCUTANEOUS at 14:21

## 2021-07-26 RX ADMIN — HEPARIN SODIUM 5000 UNITS: 5000 INJECTION INTRAVENOUS; SUBCUTANEOUS at 06:16

## 2021-07-26 RX ADMIN — ATENOLOL 25 MG: 25 TABLET ORAL at 08:28

## 2021-07-26 RX ADMIN — DOXYCYCLINE 100 MG: 50 CAPSULE ORAL at 08:27

## 2021-07-26 RX ADMIN — VENLAFAXINE HYDROCHLORIDE 75 MG: 75 CAPSULE, EXTENDED RELEASE ORAL at 08:27

## 2021-07-26 RX ADMIN — OXYCODONE AND ACETAMINOPHEN 1 TABLET: 7.5; 325 TABLET ORAL at 14:21

## 2021-07-26 RX ADMIN — DOXYCYCLINE 100 MG: 50 CAPSULE ORAL at 21:26

## 2021-07-26 ASSESSMENT — PAIN SCALES - GENERAL
PAINLEVEL_OUTOF10: 6
PAINLEVEL_OUTOF10: 3
PAINLEVEL_OUTOF10: 5
PAINLEVEL_OUTOF10: 8

## 2021-07-26 NOTE — PROGRESS NOTES
Occupational Therapy   Occupational Therapy Initial Assessment and Treatment  Discharge  Date: 2021   Patient Name: Itz Dennis  MRN: [id number]     : 1940    Date of Service: 2021    Discharge Recommendations:  Itz Dennis scored a 21/24 on the AM-PAC ADL Inpatient form. Current research shows that an AM-PAC score of 18 or greater is typically associated with a discharge to the patient's home setting. OT Equipment Recommendations  Equipment Needed: No    Assessment   Assessment: Pt is functioning at SB/Supervision level for ADLs and functional transfers. Appears to be functioning near her baseline - and pt agrees. Pt denies concerns for return home. No acute OT needs indicated. Will sign off. No DME needs. Decision Making: Low Complexity  OT Education: OT Role;Plan of Care  Patient Education: educated about appropriate levels of activity during admission - stated understanding/agreement  No Skilled OT: No OT goals identified  REQUIRES OT FOLLOW UP: No  Activity Tolerance  Activity Tolerance: Patient Tolerated treatment well  Safety Devices  Safety Devices in place: Yes  Type of devices: Nurse notified; Left in chair;Chair alarm in place;Call light within reach           Patient Diagnosis(es): The encounter diagnosis was Acute kidney injury ([address] has a past medical history of Anxiety, Colon polyps, CVA (cerebral vascular accident) ([address] HTN (hypertension), Hyperlipidemia, Kidney stones, Neurologic gait dysfunction, Proteinuria, Screening mammogram, and Ureteral lesion, native, right.   has a past surgical history that includes Colonoscopy (); ERCP (2015); Wrist fracture surgery (Left, 2016); Cystoscopy; Mouth surgery; eye surgery (Bilateral); Cystoscopy (Right, 2021); and Cystoscopy (Right, 2021). Restrictions  Position Activity Restriction  Other position/activity restrictions: up with assist    Subjective   General  Chart Reviewed:  Yes  Additional Pertinent Hx: 80 y.o. female who presents to the ED with fatigue that has been occurring for a week. Hospital Course: PMH: chronic back pain, CKD3, and recent urology procedure for urothelial carcinoma (7/20; cytoscopy, right ureteroscopy with biopsy, laser resection of tumor, right ureteral stent exchange, and retrograde pyelogram)  Family / Caregiver Present: No  Referring Practitioner: Dr. Sofi Cassidy  Diagnosis: CHAPO    Subjective  Subjective: In chair on entry. Reporting she feels she is near her baseline at this time. Denies concerns for return home. Hopeful to return home today if possible.     Patient Currently in Pain: Yes (rates chronic back pain 4/10, RN aware)      Social/Functional History  Social/Functional History  Lives With: Other (comment) (friend (roommate is 79 yo and 'gets around better than I do\"))  Type of Home: House  Home Layout: Two level, Performs ADL's on one level  Home Access: Stairs to enter without rails (2)  Bathroom Shower/Tub: Tub/Shower unit  Bathroom Toilet: Standard (toilet safety frame)  Bathroom Equipment: Grab bars in shower, Tub transfer bench, Hand-held shower  Bathroom Accessibility: Accessible  Home Equipment: 4 wheeled walker, Cane  ADL Assistance: Independent  Homemaking Assistance: Needs assistance (shares w/ friend)  Ambulation Assistance: Independent (cane prn when leaving home; no AD in the home)  Transfer Assistance: Independent  Active : Yes  Occupation: Retired  Type of occupation:   Leisure & Hobbies: watch movies  IADL Comments: typically goes to [employer] w/ friend  Additional Comments: 5 falls in the last couple of months - \"got weak, fell, and couldn't get up\"       Objective   Vision: Impaired  Vision Exceptions: Wears glasses at all times  Hearing: Within functional limits      Orientation  Overall Orientation Status: Within Functional Limits        Balance  Sitting Balance: Independent  Standing Balance:

## 2021-07-26 NOTE — CARE COORDINATION
CM following, pt set up with [address] [place] [address] skilled care for [facility] needs and has been cleared for DC by surgical team.    Medical team monitoring Creat 1.6 today, medication adjustments being made. Will Dc home with [facility] once medically stable for DC. Electronically signed by Griffin Holcomb RN on 7/26/2021 at 1:14 PM  [phone]    UPDATE:  Spoke with Reilly Jaramillo they want pt to DC to [facility], referral sent spoke with Ameya Hdz they can accept no precert needed. Pt and family aware will likely not be a long stay. Plan to DC Tuesday.   Electronically signed by Griffin Holcomb RN on 7/26/2021 at 3:05 PM  [phone]

## 2021-07-26 NOTE — PROGRESS NOTES
Physical Therapy    Facility/Department: [facility] [address]  Initial Assessment/discharge note      NAME: Tayler Shabazz  : 1940  MRN: [id number]    Date of Service: 2021    Discharge Recommendations:Silvina Stephens scored a 22/24 on the AM-PAC short mobility form. Current research shows that an AM-PAC score of 18 or greater is typically associated with a discharge to the patient's home setting. Based on the patient's AM-PAC score and their current functional mobility deficits, it is recommended that the patient have 2-3 sessions per week of Physical Therapy at d/c to increase the patient's independence. At this time, this patient demonstrates the endurance and safety to discharge home with (home  services) and a follow up treatment frequency of 2-3x/wk. Please see assessment section for further patient specific details. PT Equipment Recommendations  Equipment Needed: No    Assessment   Assessment: 81 yo admitted for fall/UTI/CHAPO. Pt demo mobility below her reported baseline of independent at home without AD inside her home. Pt reports multiple falls in past few weeks. Pt plans to return home at discharge. If home, recommend 24 hour supervision initially, home PT safety eval, use of RW at all times for increased safety. No acute PT  needs identified so will sign off. Recommend nursing continue to encourage ambulation PRN and often with RW, SBA. RN aware. Treatment Diagnosis: mobility impairment due to CHAPO  Decision Making: Low Complexity  Patient Education: Pt educated on PT role, importance of OOB mobility, need to call for assist to get up, recommend use of RW at all times for safety and she verbalized understanding. REQUIRES PT FOLLOW UP: No  Activity Tolerance  Activity Tolerance: Patient Tolerated treatment well;Patient limited by pain       Patient Diagnosis(es): The encounter diagnosis was Acute kidney injury ([facility] [address]      has a past medical history of Anxiety, Colon polyps, CVA (cerebral vascular accident) ([facility] [address] HTN (hypertension), Hyperlipidemia, Kidney stones, Neurologic gait dysfunction, Proteinuria, Screening mammogram, and Ureteral lesion, native, right.   has a past surgical history that includes Colonoscopy (2010); ERCP (5/8/2015); Wrist fracture surgery (Left, 12/23/2016); Cystoscopy; Mouth surgery; eye surgery (Bilateral); Cystoscopy (Right, 4/21/2021); and Cystoscopy (Right, 7/20/2021). Restrictions  Position Activity Restriction  Other position/activity restrictions: up with assistVision/Hearing  Vision: Impaired  Vision Exceptions: Wears glasses at all times  Hearing: Within functional limits   Subjective  General  Chart Reviewed: Yes  Additional Pertinent Hx: 80year old female with history of chronic back pain, CKD3, and recent urology procedure for urothelial carcinoma (7/20; cytoscopy, right ureteroscopy with biopsy, laser resection of tumor, right ureteral stent exchange, and retrograde pyelogram) who is presented to ED 7/24/21 with worsening fatigue over the last week. Surgery consult-s/p bedside I and D of posterior neck abcess; abd CT neg. Family / Caregiver Present: No  Diagnosis: CHAPO  Follows Commands: Within Functional Limits  Subjective  Subjective: Pt found up in chair and agreeable to PT. \" I hope I'm going home today.  \"  Pain Screening  Patient Currently in Pain: Yes (rates chronic back pain 4/10, RN aware)         Orientation  Orientation  Overall Orientation Status: Within Functional Limits     Social/Functional History  Social/Functional History  Lives With: Other (comment) (friend (roommate is 81 yo and 'gets around better than I do\"))  Type of Home: House  Home Layout: Two level, Performs ADL's on one level  Home Access: Stairs to enter without rails (2)  Bathroom Shower/Tub: Tub/Shower unit  Bathroom Toilet: Standard (toilet safety frame)  Bathroom Equipment: Grab bars in shower, Tub transfer bench, Hand-held shower  Bathroom Accessibility: [address] Equipment: 4 wheeled walker, Cane  ADL Assistance: Independent  Homemaking Assistance: Needs assistance (shares w/ friend)  Ambulation Assistance: Independent (cane prn when leaving home; no AD in the home)  Transfer Assistance: Independent  Active : Yes  Occupation: Retired  Type of occupation:   Leisure & Hobbies: watch movies  IADL Comments: typically goes to [employer] w/ friend  Additional Comments: 5 falls in the last couple of months - \"got weak, fell, and couldn't get up\"         Objective          AROM RLE (degrees)  RLE AROM: WFL  AROM LLE (degrees)  LLE AROM : WFL     Strength RLE  Strength RLE: WFL  Strength LLE  Strength LLE: WFL        Bed mobility  Supine to Sit: Independent  Sit to Supine: Independent  Scooting: Independent     Transfers  Sit to Stand: Supervision (x3 trials)  Stand to sit: Supervision (x3 trials; vc for hand placement/safety)     Ambulation  Device: Rolling Walker  Assistance: Contact guard assistance (progressing to SBA)  Quality of Gait: slow, steady gait with decreased step length; slight forward posture; no overt LOB noted  Distance: 250 ft, 20 ft x2  Stairs/Curb  Stairs?:  (up/down curb ht step with rail supervision)              Plan   Safety Devices  Type of devices: Nurse notified, Call light within reach, Chair alarm in place, Left in chair      AM-PAC Score  AM-PAC Inpatient Mobility Raw Score : 22 (07/26/21 0951)  AM-PAC Inpatient T-Scale Score : 53.28 (07/26/21 0951)  Mobility Inpatient CMS 0-100% Score: 20.91 (07/26/21 0951)  Mobility Inpatient CMS G-Code Modifier : CJ (07/26/21 7557)          Therapy Time   Individual Concurrent Group Co-treatment   Time In 0900         Time Out 0940         Minutes 40           Timed Code Treatment Minutes:30       Total Treatment Minutes:  40       Goran Candiceers, PT

## 2021-07-26 NOTE — PROGRESS NOTES
Pt ao4, vss. Surgical pain on the neck is controlled w prn pain meds. Pt denied n/v,sob. Surgical dressing was changed this shift by resident. Pt calls out appropriately to use restroom.  Will continue to monitor

## 2021-07-26 NOTE — PROGRESS NOTES
Progress Note    Admit Date: 7/24/2021  Day: 2  Diet: ADULT DIET; Regular; Low Potassium (Less than 3000 mg/day)    CC: fatigue    Interval history: No acute events overnight. Patient sitting comfortably in chair eating breakfast. She has no complaints. She feels her pain is well controlled. ROS: Denies fever/chills, headache, SOB, chest pain, abdo pain, LE pain or swelling, dysuria, no diarrhea, no vision changes, no hearing change, no difficulty swallowing, no numbness or tingling.    +constipation, +fatigue     HPI: Patient is a 80year old female with history of chronic back pain, CKD3, and recent urology procedure for urothelial carcinoma (7/20; cytoscopy, right ureteroscopy with biopsy, laser resection of tumor, right ureteral stent exchange, and retrograde pyelogram) who presents with worsening fatigue over the last week.      Medications:     Scheduled Meds:   doxycycline monohydrate  100 mg Oral 2 times per day    atenolol  25 mg Oral Daily    mirtazapine  7.5 mg Oral Nightly    venlafaxine  75 mg Oral Daily with breakfast    vitamin D  50,000 Units Oral Weekly    sodium chloride flush  5-40 mL Intravenous 2 times per day    nicotine  1 patch Transdermal Daily    heparin (porcine)  5,000 Units Subcutaneous 3 times per day     Continuous Infusions:   sodium chloride      sodium chloride 75 mL/hr at 07/26/21 0825     PRN Meds:hydrOXYzine, ondansetron, oxyCODONE-acetaminophen, sodium chloride flush, sodium chloride, ondansetron **OR** ondansetron, acetaminophen **OR** acetaminophen    Objective:   Vitals:   T-max:  Patient Vitals for the past 8 hrs:   BP Temp Temp src Pulse Resp SpO2   07/26/21 1014 (!) 128/57 98.4 °F (36.9 °C) Oral 52 16 99 %   07/26/21 0819 117/61 98.1 °F (36.7 °C) Oral 57 16    07/26/21 0330 122/66 98.2 °F (36.8 °C) Oral 57 16 95 %       Intake/Output Summary (Last 24 hours) at 7/26/2021 1108  Last data filed at 7/25/2021 2330  Gross per 24 hour   Intake 360 ml   Output 200 ml Net 160 ml       Review of Systems  ROS negative otherwise stated in subjective    Physical Exam   Comments: AxOx3, sitting comfortably in chair eating breakfast   HENT:      Head: Normocephalic and atraumatic.      Nose: Nose normal.      Mouth/Throat:      Mouth: Mucous membranes are moist.   Eyes:      Extraocular Movements: Extraocular movements intact.      Pupils: Pupils are equal, round, and reactive to light. Cardiovascular:      Rate and Rhythm: Normal rate and regular rhythm, S1/S2 normal, no murmurs, rubs or gallops     Pulses: 2+ peripheral pulses  Pulmonary:      Effort: Pulmonary effort is nonlabored     Breath sounds: CTAB  Abdominal:      General: Abdomen is flat. Bowel sounds are normal.      Palpations: Abdomen is soft.      Tenderness: There is no abdominal tenderness. Musculoskeletal:         General: No swelling. Normal range of motion.      Cervical back: Normal range of motion.      Right lower leg: No edema.      Left lower leg: No edema. Skin:     General: Skin is warm.      Capillary Refill: Capillary refill takes less than 2 seconds.      Findings: Lanced lesion packed with gauze and covered by dressing on R upper back. Clean, dry    and intact.    Neurological:      General: No focal deficit present.      Mental Status: She is oriented to person, place, and time.      Cranial Nerves: No cranial nerve deficit.    Psychiatric:         Mood and Affect: Mood normal. Speech normal.     LABS:    CBC:   Recent Labs     07/24/21  1316 07/25/21  0418 07/26/21  0450   WBC 8.6 6.7 6.6   HGB 10.6* 9.9* 9.4*   HCT 32.3* 30.1* 28.3*    236 214   MCV 93.0 92.7 91.4     Renal:    Recent Labs     07/24/21  2153 07/25/21  0418 07/26/21  0449    139 140   K 5.4* 5.5* 4.9    109 113*   CO2 23 24 18*   BUN 49* 50* 37*   CREATININE 2.2* 2.0* 1.6*   GLUCOSE 114* 88 84   CALCIUM 8.4 8.4 7.6*   MG  --  2.60* 2.30   PHOS 4.2 4.6 3.8   ANIONGAP 9 6 9     Hepatic:   Recent Labs CKD3  Cr increased from baseline of 1.6 to 2.4. Likely prerenal. Blood in urine and hydronephrosis with recent procedure could point to obstruction. Also question of PO intake so may have pre-renal component. - improving with fluids and PO intake. 2.4-> 1.6  - urine studies: large blood, 100 protein, small leuk esterase likely from recent uro procedure   - daily renal  - holding aleve, olman at discharge     Infected sebaceous cyst   Erythematous, raised and expelling pus on right upper back. Erythema does not extend past raised lesion.  - general surgery performed I and D yesterday   - Culture: NGTD   - doxycycline for MRSA coverage, 100 mg BID   - prn pain management APAP  - also has home PRN percocet     Hyperkalemia  Chronic, will monitor.    - low potassium diet      Placement concerns  POA expressed to ED team that there is a concern for patient caring for self. Lives home with friend who is 80  - PT/OT: scored well on AM-PAC scored as above   - social work: can be discharged home with [place] [address] for wound care      HTN  - home atenolol 5 mg daily      Chronic anemia  CBC stable  -will monitor        Code Status: limited  FEN:  Adult diet low k  PPX: subq heparin  DISPO: [facility]     Michelle Chappell MD, PGY-1  07/26/21  11:08 AM    This patient has been staffed and discussed with Coby Gould MD.     Addendum to Resident H& P/Progress note:  I have personally seen,examined and evaluated the patient.  I have reviewed the current history, physical findings, labs and assessment and plan and agree with note as documented by resident MD ( Gerard Rosario)      Coby Gould MD, Lamberto Escamilla

## 2021-07-26 NOTE — PROGRESS NOTES
Surgery Daily Progress Note      CC: posterior neck abscess    SUBJECTIVE:  Patient rested well overnight. Patient reports pain is well controlled with medications. Patient denies nausea, vomiting, and shortness of breath. Patient has been tolerating diet. ROS:   A 14 point review of systems was conducted, significant findings as noted above. All other systems negative. OBJECTIVE:    PHYSICAL EXAM:  Vitals:    07/25/21 1642 07/25/21 1945 07/25/21 2330 07/26/21 0330   BP: (!) 103/58 (!) 109/52 (!) 103/53 122/66   Pulse: 55 66 55 57   Resp:  18 16 16   Temp: 98.5 °F (36.9 °C) 98.5 °F (36.9 °C) 98.2 °F (36.8 °C) 98.2 °F (36.8 °C)   TempSrc: Oral Oral Oral Oral   SpO2: 94% 96% 94% 95%   Weight:       Height:           General appearance: Alert, no acute distress, well-developed, well-nourished  Neck: posterior neck wound packed with iodoform 1/4inch dressing and gauze, minimal drainage  Chest/Lungs: normal inspiratory effort, symmetric chest rise, no accessory muscle use  Cardiovascular: Regular rate and rhythm  Abdomen: Soft, non-distended, non-tender to palpation throughout, no guarding/rebound  Neuro: A&Ox3, no gross motor or sensory neuro deficits  Extremities: no edema, no cyanosis         ASSESSMENT & PLAN:   Lalita Gambino is a 80 y.o. female with posterior neck abscess s/p bedside I&D (7/25). - cont Doxycycline  - f/u wound cultures  - cont BID dressing changes  - cont medical management per primary team  - SW on board for home healthcare at discharge      Yaz Mcconnell DO, PGY-1  07/26/21  6:10 AM  [phone]    Discussed management with the resident. I reviewed the resident's note and agree with the documented findings and plan of care.     Scar Tang M.D.  7/26/21   12:49 PM

## 2021-07-27 VITALS
TEMPERATURE: 97.6 F | SYSTOLIC BLOOD PRESSURE: 127 MMHG | DIASTOLIC BLOOD PRESSURE: 72 MMHG | RESPIRATION RATE: 16 BRPM | HEIGHT: 61 IN | WEIGHT: 125 LBS | OXYGEN SATURATION: 98 % | HEART RATE: 54 BPM | BODY MASS INDEX: 23.6 KG/M2

## 2021-07-27 LAB
ALBUMIN SERPL-MCNC: 3 G/DL (ref 3.4–5)
ANION GAP SERPL CALCULATED.3IONS-SCNC: 7 MMOL/L (ref 3–16)
BASOPHILS ABSOLUTE: 0 K/UL (ref 0–0.2)
BASOPHILS RELATIVE PERCENT: 0.3 %
BUN BLDV-MCNC: 28 MG/DL (ref 7–20)
CALCIUM SERPL-MCNC: 8.3 MG/DL (ref 8.3–10.6)
CHLORIDE BLD-SCNC: 116 MMOL/L (ref 99–110)
CO2: 19 MMOL/L (ref 21–32)
CREAT SERPL-MCNC: 1.2 MG/DL (ref 0.6–1.2)
EOSINOPHILS ABSOLUTE: 0.3 K/UL (ref 0–0.6)
EOSINOPHILS RELATIVE PERCENT: 4.2 %
GFR AFRICAN AMERICAN: 52
GFR NON-AFRICAN AMERICAN: 43
GLUCOSE BLD-MCNC: 92 MG/DL (ref 70–99)
HCT VFR BLD CALC: 27.1 % (ref 36–48)
HEMOGLOBIN: 9 G/DL (ref 12–16)
LYMPHOCYTES ABSOLUTE: 1.4 K/UL (ref 1–5.1)
LYMPHOCYTES RELATIVE PERCENT: 19.7 %
MAGNESIUM: 1.9 MG/DL (ref 1.8–2.4)
MCH RBC QN AUTO: 30.4 PG (ref 26–34)
MCHC RBC AUTO-ENTMCNC: 33.2 G/DL (ref 31–36)
MCV RBC AUTO: 91.5 FL (ref 80–100)
MONOCYTES ABSOLUTE: 0.9 K/UL (ref 0–1.3)
MONOCYTES RELATIVE PERCENT: 12.4 %
MRSA CULTURE ONLY: NORMAL
NEUTROPHILS ABSOLUTE: 4.5 K/UL (ref 1.7–7.7)
NEUTROPHILS RELATIVE PERCENT: 63.4 %
PDW BLD-RTO: 14.7 % (ref 12.4–15.4)
PHOSPHORUS: 3.3 MG/DL (ref 2.5–4.9)
PLATELET # BLD: 227 K/UL (ref 135–450)
PMV BLD AUTO: 8.8 FL (ref 5–10.5)
POTASSIUM SERPL-SCNC: 4.8 MMOL/L (ref 3.5–5.1)
RBC # BLD: 2.97 M/UL (ref 4–5.2)
SODIUM BLD-SCNC: 142 MMOL/L (ref 136–145)
WBC # BLD: 7.1 K/UL (ref 4–11)

## 2021-07-27 PROCEDURE — 2580000003 HC RX 258: Performed by: HOSPITALIST

## 2021-07-27 PROCEDURE — 6370000000 HC RX 637 (ALT 250 FOR IP): Performed by: STUDENT IN AN ORGANIZED HEALTH CARE EDUCATION/TRAINING PROGRAM

## 2021-07-27 PROCEDURE — 6360000002 HC RX W HCPCS: Performed by: STUDENT IN AN ORGANIZED HEALTH CARE EDUCATION/TRAINING PROGRAM

## 2021-07-27 PROCEDURE — 99239 HOSP IP/OBS DSCHRG MGMT >30: CPT | Performed by: HOSPITALIST

## 2021-07-27 PROCEDURE — 36415 COLL VENOUS BLD VENIPUNCTURE: CPT

## 2021-07-27 PROCEDURE — 80069 RENAL FUNCTION PANEL: CPT

## 2021-07-27 PROCEDURE — 83735 ASSAY OF MAGNESIUM: CPT

## 2021-07-27 PROCEDURE — 2580000003 HC RX 258: Performed by: STUDENT IN AN ORGANIZED HEALTH CARE EDUCATION/TRAINING PROGRAM

## 2021-07-27 PROCEDURE — 85025 COMPLETE CBC W/AUTO DIFF WBC: CPT

## 2021-07-27 RX ORDER — MAGNESIUM SULFATE IN WATER 40 MG/ML
2000 INJECTION, SOLUTION INTRAVENOUS ONCE
Status: COMPLETED | OUTPATIENT
Start: 2021-07-27 | End: 2021-07-27

## 2021-07-27 RX ORDER — DOXYCYCLINE 100 MG/1
100 CAPSULE ORAL EVERY 12 HOURS SCHEDULED
Qty: 14 CAPSULE | Refills: 0 | Status: SHIPPED | OUTPATIENT
Start: 2021-07-28 | End: 2021-08-04

## 2021-07-27 RX ORDER — MIRTAZAPINE 15 MG/1
15 TABLET, FILM COATED ORAL NIGHTLY
Qty: 30 TABLET | Refills: 3 | Status: SHIPPED | OUTPATIENT
Start: 2021-07-27 | End: 2022-05-17

## 2021-07-27 RX ADMIN — OXYCODONE AND ACETAMINOPHEN 1 TABLET: 7.5; 325 TABLET ORAL at 08:03

## 2021-07-27 RX ADMIN — MAGNESIUM SULFATE HEPTAHYDRATE 2000 MG: 2 INJECTION, SOLUTION INTRAVENOUS at 06:37

## 2021-07-27 RX ADMIN — ATENOLOL 25 MG: 25 TABLET ORAL at 07:57

## 2021-07-27 RX ADMIN — DOXYCYCLINE 100 MG: 50 CAPSULE ORAL at 07:57

## 2021-07-27 RX ADMIN — Medication 10 ML: at 07:58

## 2021-07-27 RX ADMIN — HEPARIN SODIUM 5000 UNITS: 5000 INJECTION INTRAVENOUS; SUBCUTANEOUS at 06:10

## 2021-07-27 RX ADMIN — SODIUM CHLORIDE: 9 INJECTION, SOLUTION INTRAVENOUS at 03:30

## 2021-07-27 RX ADMIN — VENLAFAXINE HYDROCHLORIDE 75 MG: 75 CAPSULE, EXTENDED RELEASE ORAL at 07:58

## 2021-07-27 ASSESSMENT — PAIN SCALES - GENERAL
PAINLEVEL_OUTOF10: 5
PAINLEVEL_OUTOF10: 5
PAINLEVEL_OUTOF10: 2

## 2021-07-27 NOTE — PROGRESS NOTES
Surgery Daily Progress Note      CC: posterior neck abscess    SUBJECTIVE:  Patient rested well overnight. Patient reports neck pain is improved and well controlled with medications. Patient denies nausea, vomiting, and shortness of breath. Patient has been tolerating diet. ROS:   A 14 point review of systems was conducted, significant findings as noted above. All other systems negative. OBJECTIVE:    PHYSICAL EXAM:  Vitals:    07/26/21 1547 07/1940 07/26/21 2237 07/27/21 0318   BP: 139/65 133/68 (!) 146/72 134/67   Pulse: 60 63 67 54   Resp: 16 18 16 16   Temp: 97.7 °F (36.5 °C) 98.8 °F (37.1 °C) 98.5 °F (36.9 °C) 97.9 °F (36.6 °C)   TempSrc: Oral Oral Oral Oral   SpO2: 98% 97% 96% 95%   Weight:       Height:           General appearance: Alert, no acute distress, well-developed, well-nourished  Neck: posterior neck wound packed with iodoform 1/4inch dressing and gauze, minimal drainage; erythema improving  Chest/Lungs: normal inspiratory effort, symmetric chest rise, no accessory muscle use  Cardiovascular: Regular rate and rhythm  Abdomen: Soft, non-distended, non-tender to palpation throughout, no guarding/rebound  Neuro: A&Ox3, no gross motor or sensory neuro deficits  Extremities: no edema, no cyanosis         ASSESSMENT & PLAN:   Reji Thakkar is a 80 y.o. female with posterior neck abscess s/p bedside I&D (7/25).      - cont Doxycycline  - wound cultures without organisms  - cont BID dressing changes; at discharge OK for daily dressing changes  - cont medical management per primary team  - OK to discharge from general surgery standpoint, home healthcare set up      Govind Cardenas DO, PGY-1  07/27/21  6:06 AM  [phone]

## 2021-07-27 NOTE — DISCHARGE INSTR - COC
Continuity of Care Form    Patient Name: Fly Costello   :  1940  MRN:  [id number]    Admit date:  2021  Discharge date:  21    Code Status Order: Limited   Advance Directives:      Admitting Physician:  Andrés Vargas MD  PCP: Clayton Shah MD    Discharging Nurse: [facility] Unit/Room#: 6099/5490-91  Discharging Unit Phone Number: [phone]    Emergency Contact:   Extended Emergency Contact Information  Primary Emergency Contact: Luis Alfredo Orellana  Home Phone: [phone]  Mobile Phone: [phone]  Relation: Other  Secondary Emergency Contact: [place] Austyn [address] Phone: [phone]  Relation: Other    Past Surgical History:  Past Surgical History:   Procedure Laterality Date    COLONOSCOPY      Dr. Mikhail Orozco - recheck 5 yrs    CYSTOSCOPY      CYSTOSCOPY Right 2021    CYSTOSCOPY, RIGHT URETEROSCOPY, WITH RETROGRADE, RIGHT URETERAL BIOPSY,  RIGHT STENT EXCHANGE performed by Mariama Harkins MD at [place] [address] Right 2021    CYSTOSCOPY, RIGHT URETEROSCOPY WITH BIOPSY / LASER RESECTION OF TUMOR performed by Mariama Harkins MD at [address] [place] [address] ERCP  2015    sphincter and stent    EYE SURGERY Bilateral     cataract removal    MOUTH SURGERY      WRIST FRACTURE SURGERY Left 2016    OPEN REDUCTION INTERNAL FIXATION LEFT DISTAL RADIUS FRACTURE       Immunization History:   Immunization History   Administered Date(s) Administered    COVID-19, Mallory Peter, PF, 30mcg/0.3mL 2021, 2021    DT (pediatric) 2011    Influenza 10/17/2011    Influenza, High Dose (Fluzone 65 yrs and older) 10/08/2012, 10/14/2014, 10/31/2016, 2018, 2019    Influenza, Intradermal, Preservative free 2015    Influenza, Quadv, adjuvanted, 65 yrs +, IM, PF (Fluad) 2020    Pneumococcal Conjugate 13-valent (Vuifepx82) 2015    Pneumococcal Conjugate 7-valent (Prevnar7) 2018    Pneumococcal Polysaccharide ([id number]) 08/20/2018       Active Problems:  Patient Active Problem List   Diagnosis Code    Anxiety and depression F41.9, F32.9    Colon polyps K63.5    Tremor R25.1    Cigarette smoker F17.210    Hyperlipidemia E78.5    Essential hypertension I10    Thyroid nodule E04.1    Chronic bilateral low back pain without sciatica M54.5, G89.29    Abnormal CT of the abdomen R93.5    Renal mass, right N28.89    Chronic right flank pain R10.9, G89.29    Chronic, continuous use of opioids F11.90    Spinal stenosis of lumbar region with neurogenic claudication M48.062    Other osteoporosis without current pathological fracture M81.8    Other emphysema ([facility]) J43.8    CVA (cerebral vascular accident) ([facility] [address] I63.9    Sleep difficulties G47.9    Acute renal failure superimposed on stage 3a chronic kidney disease (HCC) N17.9, N18.31    Hydronephrosis N13.30    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 XXXS, Y92.009    Acidosis E87.2    Hyperkalemia E87.5    Scalp laceration S01. 01XA    Acute encephalopathy G93.40    Acute UTI N39.0    Hypotension due to hypovolemia I95.89, E86.1    Falls frequently R29.6    CHAPO (acute kidney injury) ([facility] [address] N17.9    Acute kidney injury ([facility] [address] N17.9    Neck abscess L02.11    Chronic fatigue R53.82       Isolation/Infection:   Isolation            No Isolation          Patient Infection Status       None to display            Nurse Assessment:  Last Vital Signs: /63   Pulse 60   Temp 98.1 °F (36.7 °C) (Oral)   Resp 16   Ht 5' 1\" (1.549 m)   Wt 125 lb (56.7 kg)   SpO2 95%   BMI 23.62 kg/m²     Last documented pain score (0-10 scale): Pain Level: 5  Last Weight:   Wt Readings from Last 1 Encounters:   07/24/21 125 lb (56.7 kg)     Mental Status:  oriented and alert    IV Access:  - None    Nursing Mobility/ADLs:  Walking   Assisted  Transfer  Assisted  Bathing  Assisted  Dressing  Assisted  Toileting Assisted  Feeding  Independent  Med Admin  Independent  Med Delivery   whole    Wound Care Documentation and Therapy:        Elimination:  Continence:   · Bowel: yes  · Bladder: Yes  Urinary Catheter: None   Colostomy/Ileostomy/Ileal Conduit: No       Date of Last BM: 7/26/21    Intake/Output Summary (Last 24 hours) at 7/27/2021 0959  Last data filed at 7/26/2021 1942  Gross per 24 hour   Intake 240 ml   Output    Net 240 ml     I/O last 3 completed shifts: In: 240 [P.O.:240]  Out: -     Safety Concerns:     Arnie Harmon MIREYA Safety Concerns:[id number]:::0}    Impairments/Disabilities:      None    Nutrition Therapy:  Current Nutrition Therapy:   - Oral Diet:  General    Routes of Feeding: Oral  Liquids: No Restrictions  Daily Fluid Restriction: no  Last Modified Barium Swallow with Video (Video Swallowing Test): not done    Treatments at the Time of Hospital Discharge:   Respiratory Treatments: no  Oxygen Therapy:  is not on home oxygen therapy.   Ventilator:    - No ventilator support    Rehab Therapies: {THERAPEUTIC INTERVENTION:[id number]}  Weight Bearing Status/Restrictions: No weight bearing restirctions  Other Medical Equipment (for information only, NOT a DME order):  walker  Other Treatments: none    Patient's personal belongings (please select all that are sent with patient):  {[facility] DME Belongings:[id number]:::0}    RN SIGNATURE:  Electronically signed by Todd Calvert RN on 7/27/21 at 11:05 AM EDT    CASE MANAGEMENT/SOCIAL WORK SECTION    Inpatient Status Date: ***    Readmission Risk Assessment Score:  Readmission Risk              Risk of Unplanned Readmission:  25           Discharging to Facility/ Vian[facility] Rajput             [address] [place] [address]       Phone: [phone]       Fax: [phone]        ·   ·     / signature: Electronically signed by Jing Kirk RN on 7/27/21 at 10:49 AM EDT    PHYSICIAN SECTION    Prognosis: Good    Condition at Discharge: Stable    Rehab Potential (if transferring to Rehab): Good    Recommended Labs or Other Treatments After Discharge: PT/OT, 7 more days of doxycycline 100 mg BID, daily dressing changes, maintain hydration   Please change packing daily, see home care orders. Please follow up outpatient with Dr. Lynda Wallace in 2 weeks, for wound check. Please call the office at [phone] to make your appointment. Physician Certification: I certify the above information and transfer of Hunter Crisostomo  is necessary for the continuing treatment of the diagnosis listed and that she requires [facility] for greater than 30 days.      Update Admission H&P: No change in H&P    PHYSICIAN SIGNATURE:  Electronically signed by John Paul Yanez MD on 7/27/21 at 10:02 AM EDT

## 2021-07-27 NOTE — CARE COORDINATION
Case Management Assessment            Discharge Note                    Date / Time of Note: 7/27/2021 11:04 AM                  Discharge Note Completed by: Carolina Olsen RN    Patient Name: Rylie Ramirez   YOB: 1940  Diagnosis: CHAPO (acute kidney injury) [facility]) [N17.9]   Date / Time: 7/24/2021  2:05 PM    Current PCP: Elba Matta MD  Clinic patient: No    Hospitalization in the last 30 days: Yes    Advance Directives:  Code Status: Limited  [place] DNR form completed and on chart: Not Indicated    Financial:  Payor: MEDICARE / Plan: MEDICARE PART A AND B / Product Type: *No Product type* /      Pharmacy:    [facility], [address] [place], [place] [address] [place]  Phone: [phone] Fax: [phone]      Assistance purchasing medications?: Potential Assistance Purchasing Medications: No  Assistance provided by Case Management: None at this time    Does patient want to participate in local refill/ meds to beds program?: No    Meds To Beds General Rules:  1. Can ONLY be done Monday- Friday between 8:30am-5pm  2. Prescription(s) must be in pharmacy by 3pm to be filled same day  3. Copy of patient's insurance/ prescription drug card and patient face sheet must be sent along with the prescription(s)  4. Cost of Rx cannot be added to hospital bill. If financial assistance is needed, please contact unit  or ;  or  CANNOT provide pharmacy voucher for patients co-pays  5.  Patients can then  the prescription on their way out of the hospital at discharge, or pharmacy can deliver to the bedside if staff is available. (payment due at time of pick-up or delivery - cash, check, or card accepted)     Able to afford home medications/ co-pay costs: Yes    ADLS:  Current PT AM-PAC Score: 22 /24  Current OT AM-PAC Score: 21 /24      DISCHARGE Disposition: Skilled Nursing Facility (SNF): [place]  Phone: [phone] Fax: [phone]    LOC at discharge: Skilled  [address] Completed: Not Indicated    Notification completed in HENS/PAS?:  Yes : CM has completed HENS online through secure website for SNF admission at [facility] . Document ID #: [id number]    IMM Completed:   Yes, Case management has presented and reviewed IMM letter #2 to the patient and/or family/ POA. Patient and/or family/POA verbalized understanding of their medicare rights and appeal process if needed. Patient and/or family/POA has signed, initialed and placed today's date (7/27/21) and time (1100) on IMM letter #2 on the the appropriate lines. Patient and/or family/POA, copy of letter offered and they are aware that this original copy of IMM letter #2 is available prior to discharge from the paper chart on the unit. Electronic documentation has been entered into epic for IMM letter #2 and original paper copy has been added to the paper chart at the nurses station.      Transportation:  Transportation PLAN for discharge: EMS transportation   Mode of Transport: Ambulance stretcher - [address] care [phone] at [address] [place] [address] Avenue:  [address] [facility] ordered at discharge: Not [address]: Not Applicable  Orders faxed: No    Durable Medical Equipment:  DME Provider: none  Equipment obtained during hospitalization:     Home Oxygen and Respiratory Equipment:  Oxygen needed at discharge?: Not [address]: Not Applicable  Portable tank available for discharge?: Not Indicated    Dialysis:  Dialysis patient: No    Dialysis Center:  Not Applicable      Additional CM Notes: Pt form home Jason and pt want SNF, accepted at [facility] able to take today, nurse to call report to [phone] orders faxed to [phone], [facility] to transport at 200 today    The Plan for Transition of Care is related to the following treatment goals of CHAPO (acute kidney injury) ([facility] [address] [N17.9]    The Patient and/or patient representative Kapil Butts and her family were provided with a choice of provider and agrees with the discharge plan Yes    Freedom of choice list was provided with basic dialogue that supports the patient's individualized plan of care/goals and shares the quality data associated with the providers.  Yes    Care Transitions patient: Yes    Zoie Brice RN  [facility] [employer]  Case Management Department  Ph: [phone]  Fax: [phone]

## 2021-07-27 NOTE — PROGRESS NOTES
Patient alert and oriented. Discharge instructions provided to patient and family member. IV line out. No complication noted. Report given to the charge nurse in [facility]. Pt discharged with family member.

## 2021-07-28 LAB
GRAM STAIN RESULT: NORMAL
WOUND/ABSCESS: NORMAL

## 2021-08-04 NOTE — PROGRESS NOTES
Physician Progress Note      Terrance Adkins  [facility] #:                  [id number]  :                       1940  ADMIT DATE:       2021 2:05 PM  [address] DATE:        2021 12:36 PM  RESPONDING  PROVIDER #:        Kelvin Flower MD          QUERY TEXT:    Per I&D procedure, please clarify the depth of debridement. The medical record reflects the following:  Risk Factors: Infected sebaceous cyst of neck, CHAPO, dehydration  Clinical Indicators: Per Procedure note: -# 11 scalpel was used to create a 3   cm incision over the area of greatest fluctuance. Approximately 5cc mL   sanguinopurulent sebaceous contents were expressed and cultures obtained. Loculations were broken up using blunt dissection. Wound flushed with saline   and packed with  iodoform guaze.  -Consistent with infected sebaceous cyst, attempted to core out capsule,   however, discussed with patient possible recurrence and may need surgical   excision at a later time when infection resolves if recurs. Patient in   agreement. Treatment: I&D  Options provided:  -- Nonexcisional debridement of subcutaneous tissue  -- Excisional debridement of subcutaneous tissue  -- Other - I will add my own diagnosis  -- Disagree - Not applicable / Not valid  -- Disagree - Clinically unable to determine / Unknown  -- Refer to Clinical Documentation Reviewer    PROVIDER RESPONSE TEXT:    Non-excisional debridement of subcutaneous tissue of infected sebaceous cyst   was performed during procedure on .     Query created by: Jesus Butler on 8/3/2021 7:00 AM      Electronically signed by:  Kelvin Flower MD 2021 6:20 AM

## 2021-09-15 ENCOUNTER — TELEPHONE (OUTPATIENT)
Dept: INTERNAL MEDICINE CLINIC | Age: 81
End: 2021-09-15

## 2021-09-15 RX ORDER — ATENOLOL 25 MG/1
TABLET ORAL
Qty: 30 TABLET | Refills: 2
Start: 2021-09-15 | End: 2021-11-22

## 2021-09-15 NOTE — TELEPHONE ENCOUNTER
Change atenolol to 1/2 tablet daily. Call if heart rate stays in the 40s or goes over 90 often. Give it about 5 days to adjust to the new dose.   Please change in med list to half tablet daily

## 2021-09-15 NOTE — TELEPHONE ENCOUNTER
Temitope Peacock called to let us know that the patient has been complaining of fatigue and has a hr of 46 this was taken manually and daron also asked if there are prameters on Atenolol if not can they have some ?     Please advise

## 2021-09-20 ENCOUNTER — TELEPHONE (OUTPATIENT)
Dept: INTERNAL MEDICINE CLINIC | Age: 81
End: 2021-09-20

## 2021-09-20 NOTE — TELEPHONE ENCOUNTER
----- Message from Aden Sol sent at 9/20/2021  1:17 PM EDT -----  Subject: Message to Provider    QUESTIONS  Information for Provider? Patient's nurse came out today and her BP is   running on the low side still(110/60) and her pulse is 48. She wanted to   reach out and let the doctor know  ---------------------------------------------------------------------------  --------------  [address]  What is the best way for the office to contact you? OK to leave message on   voicemail  Preferred Call Back Phone Number? [phone]  ---------------------------------------------------------------------------  --------------  SCRIPT ANSWERS  Relationship to Patient? Third Party  Representative Name?  April - her nurse

## 2021-10-06 ENCOUNTER — TELEPHONE (OUTPATIENT)
Dept: INTERNAL MEDICINE CLINIC | Age: 81
End: 2021-10-06

## 2021-10-06 RX ORDER — FUROSEMIDE 40 MG/1
40 TABLET ORAL DAILY
Qty: 30 TABLET | Refills: 3 | Status: SHIPPED | OUTPATIENT
Start: 2021-10-06 | End: 2021-11-01

## 2021-10-18 ENCOUNTER — TELEPHONE (OUTPATIENT)
Dept: INTERNAL MEDICINE CLINIC | Age: 81
End: 2021-10-18

## 2021-11-01 ENCOUNTER — OFFICE VISIT (OUTPATIENT)
Dept: INTERNAL MEDICINE CLINIC | Age: 81
End: 2021-11-01
Payer: MEDICARE

## 2021-11-01 VITALS
HEIGHT: 61 IN | TEMPERATURE: 97 F | BODY MASS INDEX: 23.03 KG/M2 | SYSTOLIC BLOOD PRESSURE: 132 MMHG | DIASTOLIC BLOOD PRESSURE: 70 MMHG | WEIGHT: 122 LBS

## 2021-11-01 DIAGNOSIS — C66.1 UROTHELIAL CARCINOMA OF RIGHT DISTAL URETER (HCC): Primary | ICD-10-CM

## 2021-11-01 DIAGNOSIS — I10 ESSENTIAL HYPERTENSION: ICD-10-CM

## 2021-11-01 DIAGNOSIS — F41.9 ANXIETY AND DEPRESSION: ICD-10-CM

## 2021-11-01 DIAGNOSIS — N18.4 CKD (CHRONIC KIDNEY DISEASE) STAGE 4, GFR 15-29 ML/MIN (HCC): ICD-10-CM

## 2021-11-01 DIAGNOSIS — F32.A ANXIETY AND DEPRESSION: ICD-10-CM

## 2021-11-01 PROCEDURE — 99214 OFFICE O/P EST MOD 30 MIN: CPT | Performed by: HOSPITALIST

## 2021-11-01 RX ORDER — FUROSEMIDE 40 MG/1
20 TABLET ORAL DAILY
Qty: 30 TABLET | Refills: 3 | Status: SHIPPED | OUTPATIENT
Start: 2021-11-01 | End: 2022-05-02

## 2021-11-01 ASSESSMENT — ENCOUNTER SYMPTOMS
VOICE CHANGE: 0
COUGH: 0
SHORTNESS OF BREATH: 0
NAUSEA: 0
ABDOMINAL PAIN: 0
CONSTIPATION: 0
CHEST TIGHTNESS: 0
DIARRHEA: 1
SINUS PRESSURE: 1
BACK PAIN: 1
TROUBLE SWALLOWING: 0
BLOOD IN STOOL: 0
ABDOMINAL DISTENTION: 0
VOMITING: 0
SORE THROAT: 0
WHEEZING: 0
SINUS PAIN: 1

## 2021-11-01 NOTE — PROGRESS NOTES
[facility] Internal Medicine  Establish care visit   2021    Jeanette Major (:  1940) is a 80 y.o. female, here to establish care. Chief Complaint   Patient presents with    New Patient        Patient Active Problem List   Diagnosis    Anxiety and depression    Colon polyps    Tremor    Cigarette smoker    Hyperlipidemia    Essential hypertension    Thyroid nodule    Chronic bilateral low back pain without sciatica    Abnormal CT of the abdomen    Renal mass, right    Chronic right flank pain    Chronic, continuous use of opioids    Spinal stenosis of lumbar region with neurogenic claudication    Other osteoporosis without current pathological fracture    Other emphysema ([address]    CVA (cerebral vascular accident) ([address]    Sleep difficulties    Acute renal failure superimposed on stage 3a chronic kidney disease ([address]    Hydronephrosis    Urothelial carcinoma of right distal ureter ([address]    Fall at home, sequela    Acidosis    Hyperkalemia    Scalp laceration    Acute encephalopathy    Acute UTI    Hypotension due to hypovolemia    Falls frequently    CHAPO (acute kidney injury) ([address]    Acute kidney injury ([address]    Neck abscess    Chronic fatigue    CKD (chronic kidney disease) stage 4, GFR 15-29 ml/min ([facility])       HPI  -She sees a pain management doctor (Dr. Ann Mathew) and is receiving percocet for multilevel lumbar disease (lower back pain radiating into the left leg). -She sees [facility] Dr. Jimmy Mijares for ureteral obstruction/urothelial carcinoma and is due for R stent exchange . -She has stage III-IV CKD and follows with Dr. Nikki Watt 2021.  -10/15/2021 labs reviewed. ROS  Review of Systems   Constitutional: Positive for activity change and fatigue. Negative for appetite change, chills, diaphoresis, fever and unexpected weight change. HENT: Positive for postnasal drip, sinus pressure and sinus pain (Ocassional. ).  Negative for sore throat, trouble swallowing and voice change. Eyes: Negative for visual disturbance. Respiratory: Negative for cough, chest tightness, shortness of breath and wheezing. Cardiovascular: Negative for chest pain, palpitations and leg swelling. Gastrointestinal: Positive for diarrhea (Single episode relieved with immodium. ). Negative for abdominal distention, abdominal pain, blood in stool, constipation, nausea and vomiting. Endocrine: Negative for polydipsia and polyphagia. Genitourinary: Negative for decreased urine volume, difficulty urinating, dysuria and urgency. Musculoskeletal: Positive for back pain (Radicular pain L LE.) and gait problem. Negative for joint swelling and myalgias. Neurological: Negative for dizziness, seizures, syncope, light-headedness and headaches. Psychiatric/Behavioral: Positive for dysphoric mood (Mild.  ) and sleep disturbance (Wakes up after 3 hours. ). Negative for agitation, behavioral problems, confusion and suicidal ideas. HISTORIES  Current Outpatient Medications on File Prior to Visit   Medication Sig Dispense Refill    atenolol (TENORMIN) 25 MG tablet TAKE 1/2 TABLET BY MOUTH DAILY 30 tablet 2    mirtazapine (REMERON) 15 MG tablet Take 1 tablet by mouth nightly 30 tablet 3    oxyCODONE-acetaminophen (PERCOCET) 7.5-325 MG per tablet Take 1 tablet by mouth every 8 hours as needed for Pain (chronic back pain. ). Chronic back pain.  90 tablet 0    ondansetron (ZOFRAN-ODT) 4 MG disintegrating tablet Take 1 tablet by mouth 3 times daily as needed for Nausea or Vomiting 21 tablet 0    vitamin D (ERGOCALCIFEROL) 1.25 MG (71253 UT) CAPS capsule Take 1 capsule by mouth once a week 4 capsule 0    venlafaxine (EFFEXOR XR) 75 MG extended release capsule TAKE ONE CAPSULE BY MOUTH DAILY (Patient taking differently: nightly TAKE ONE CAPSULE BY MOUTH nightly) 90 capsule 1    hydrOXYzine (ATARAX) 50 MG tablet Take 1 tablet by mouth 3 times daily as needed for Anxiety 90 tablet 3     No current facility-administered medications on file prior to visit. Allergies   Allergen Reactions    Penicillins Swelling     Swelling in vaginal area only  Vaginal swelling -- Pt cannot remember all symptoms;        Past Medical History:   Diagnosis Date    Anxiety     Colon polyps     check q5yrs    CVA (cerebral vascular accident) ([address] 10/12/2018    Lacunar, left hemisphere    HTN (hypertension) 10/2011    Hyperlipidemia     Kidney stones     Neurologic gait dysfunction     Proteinuria 10/2011    Screening mammogram 2010    benign    Ureteral lesion, native, right        Patient Active Problem List   Diagnosis    Anxiety and depression    Colon polyps    Tremor    Cigarette smoker    Hyperlipidemia    Essential hypertension    Thyroid nodule    Chronic bilateral low back pain without sciatica    Abnormal CT of the abdomen    Renal mass, right    Chronic right flank pain    Chronic, continuous use of opioids    Spinal stenosis of lumbar region with neurogenic claudication    Other osteoporosis without current pathological fracture    Other emphysema ([address]    CVA (cerebral vascular accident) ([address]    Sleep difficulties    Acute renal failure superimposed on stage 3a chronic kidney disease ([address]    Hydronephrosis    Urothelial carcinoma of right distal ureter ([address]    Fall at home, sequela    Acidosis    Hyperkalemia    Scalp laceration    Acute encephalopathy    Acute UTI    Hypotension due to hypovolemia    Falls frequently    CHAPO (acute kidney injury) ([address]    Acute kidney injury ([address]    Neck abscess    Chronic fatigue    CKD (chronic kidney disease) stage 4, GFR 15-29 ml/min ([address]       Past Surgical History:   Procedure Laterality Date    COLONOSCOPY  2010    Dr. Margi Torres - recheck 5 yrs    CYSTOSCOPY      CYSTOSCOPY Right 4/21/2021    CYSTOSCOPY, RIGHT URETEROSCOPY, WITH RETROGRADE, RIGHT URETERAL BIOPSY,  RIGHT STENT EXCHANGE performed by Gabe Fry MD at [facility] Right 7/20/2021    CYSTOSCOPY, RIGHT URETEROSCOPY WITH BIOPSY / LASER RESECTION OF TUMOR performed by Eugenia Cárdenas MD at [address]  5/8/2015    sphincter and stent    EYE SURGERY Bilateral     cataract removal    MOUTH SURGERY      WRIST FRACTURE SURGERY Left 12/23/2016    OPEN REDUCTION INTERNAL FIXATION LEFT DISTAL RADIUS FRACTURE       Social History     Socioeconomic History    Marital status:      Spouse name: Not on file    Number of children: 0    Years of education: Not on file    Highest education level: Not on file   Occupational History    Not on file   Tobacco Use    Smoking status: Current Every Day Smoker     Packs/day: 1.00     Years: 50.00     Pack years: 50.00     Types: Cigarettes     Start date: 5/1/1958    Smokeless tobacco: Never Used   Vaping Use    Vaping Use: Never used   Substance and Sexual Activity    Alcohol use: No     Alcohol/week: 0.0 standard drinks    Drug use: Never    Sexual activity: Never   Other Topics Concern    Not on file   Social History Narrative    Not on file     Social Determinants of Health     Financial Resource Strain: Low Risk     Difficulty of Paying Living Expenses: Not hard at all   Food Insecurity: No Food Insecurity    Worried About [facility] in the Last Year: Never true    [facility] in the Last Year: Never true   Transportation Needs: No Transportation Needs    Lack of Transportation (Medical): No    Lack of Transportation (Non-Medical):  No   Physical Activity:     Days of Exercise per Week:     Minutes of Exercise per Session:    Stress:     Feeling of Stress :    Social Connections:     Frequency of Communication with Friends and Family:     Frequency of Social Gatherings with Friends and Family:     Attends Holiness Services:     Active Member of Clubs or Organizations:     Attends Club or Organization Meetings:     Marital Status:    Intimate Partner Violence:     Fear of Current or Ex-Partner:     Emotionally Abused:     Physically Abused:     Sexually Abused:         Family History   Problem Relation Age of Onset    Diabetes Mother     Heart Disease Father     Cancer Brother     Kidney Disease Brother        PE  Vitals:    11/01/21 1244   BP: 132/70   Temp: 97 °F (36.1 °C)   Weight: 122 lb (55.3 kg)   Height: 5' 1\" (1.549 m)     Estimated body mass index is 23.05 kg/m² as calculated from the following:    Height as of this encounter: 5' 1\" (1.549 m). Weight as of this encounter: 122 lb (55.3 kg). Physical Exam  Vitals reviewed. Constitutional:       General: She is not in acute distress. Appearance: Normal appearance. HENT:      Head: Normocephalic and atraumatic. Mouth/Throat:      Pharynx: Oropharynx is clear. Eyes:      Conjunctiva/sclera: Conjunctivae normal.      Pupils: Pupils are equal, round, and reactive to light. Cardiovascular:      Rate and Rhythm: Normal rate and regular rhythm. Pulses: Normal pulses. Heart sounds: Normal heart sounds. Pulmonary:      Effort: Pulmonary effort is normal. No respiratory distress. Breath sounds: Normal breath sounds. No wheezing or rales. Abdominal:      Palpations: Abdomen is soft. Tenderness: There is no abdominal tenderness. There is no rebound. Musculoskeletal:         General: No signs of injury. Normal range of motion. Cervical back: Normal range of motion and neck supple. Skin:     General: Skin is warm and dry. Coloration: Skin is not jaundiced. Neurological:      General: No focal deficit present. Mental Status: She is alert and oriented to person, place, and time. Psychiatric:         Behavior: Behavior normal.         Thought Content:  Thought content normal.         Judgment: Judgment normal.         Immunization History   Administered Date(s) Administered    COVID-19, Pfizer, PF, 30mcg/0.3mL 01/31/2021, 02/22/2021    DT (pediatric) 06/21/2011    Influenza 10/17/2011    Influenza, High Dose (Fluzone 65 yrs and older) 10/08/2012, 10/14/2014, 10/31/2016, 08/20/2018, 12/12/2019    Influenza, Intradermal, Preservative free 11/03/2015    Influenza, Quadv, adjuvanted, 65 yrs +, IM, PF (Fluad) 11/25/2020    Pneumococcal Conjugate 13-valent ([id number]) 11/03/2015    Pneumococcal Conjugate 7-valent (Prevnar7) 08/20/2018    Pneumococcal Polysaccharide ([id number]) 08/20/2018       Health Maintenance   Topic Date Due    Hepatitis C screen  Never done    Shingles Vaccine (1 of 2) Never done    Low dose CT lung screening  Never done    DTaP/Tdap/Td vaccine (2 - Tdap) 06/21/2021    COVID-19 Vaccine (3 - Pfizer booster) 08/22/2021    Flu vaccine (1) 09/01/2021    Annual Wellness Visit (AWV)  11/26/2021    Potassium monitoring  10/15/2022    Creatinine monitoring  10/15/2022    Pneumococcal 65+ years Vaccine (2 of 2 - PPSV23) 08/20/2023    DEXA (modify frequency per FRAX score)  Completed    Hepatitis A vaccine  Aged Out    Hepatitis B vaccine  Aged Out    Hib vaccine  Aged Out    Meningococcal (ACWY) vaccine  Aged Out       PSH, PMH, SH and FH reviewed and noted. Recent and past labs, tests and consults also reviewed. Recent or new meds also reviewed. ASSESSMENT/ PLAN:  1. CKD (chronic kidney disease) stage 4, GFR 15-29 ml/min ([facility])  -Cr slightly worse at last check.    -Reducing lasix to 20 mg po daily (from 40). -F/U Dr. Evelio Sorensen. 2. Urothelial carcinoma of right distal ureter ([facility] [address]  -Follows with Dr. Katie Ovalle 11/24 for stent exchange. 3. Essential hypertension  -Well controlled. Continue Atenolol. 4. Anxiety and depression  -Feels this is related to chronic pain.    -Continue venlafaxine.    -Declines referral to psychology or psychiatry. No orders of the defined types were placed in this encounter.     Orders Placed This Encounter   Medications    furosemide (LASIX) 40 MG tablet     Sig: Take 0.5 tablets by mouth daily

## 2021-11-01 NOTE — PATIENT INSTRUCTIONS
Patient Education        Kidney Disease and High Blood Pressure: Care Instructions  Overview     Long-term (chronic) kidney disease happens when the kidneys cannot remove waste and keep your body's fluids and chemicals in balance. Usually, the kidneys remove waste from the blood through the urine. When the kidneys are not working well, waste can build up so much that it poisons the body. Kidney disease can make you very tired. It also can cause swelling, or edema, in your legs or other areas of your body. High blood pressure is one of the major causes of chronic kidney disease. And kidney disease can also cause high blood pressure. No matter which came first, having high blood pressure damages the tiny blood vessels in the kidneys. If you have high blood pressure, it is important to lower it. There are many things you can do to lower your blood pressure, which may help slow or stop the damage to your kidneys. Follow-up care is a key part of your treatment and safety. Be sure to make and go to all appointments, and call your doctor if you are having problems. It's also a good idea to know your test results and keep a list of the medicines you take. How can you care for yourself at home? · Be safe with medicines. Take your medicines exactly as prescribed. Call your doctor if you have any problems with your medicine. You will probably need more than one medicine to lower your blood pressure. You will get more details on the specific medicines your doctor prescribes. · Work with your doctor and a dietitian to plan meals that have the right amount of nutrients for you. You will probably have to limit salt, fluids, and protein. · Stay at a healthy weight. This is very important if you put on weight around the waist. Losing even 10 pounds can help you lower your blood pressure. · Manage other health problems such as diabetes and high cholesterol.  You can help lower your risk for heart disease and blood vessel problems with a healthy lifestyle along with medicines. · Do not take ibuprofen (Advil, Motrin) or naproxen (Aleve), or similar medicines, unless your doctor tells you to. They may make chronic kidney disease worse. It is okay to take acetaminophen (Tylenol). · If your doctor recommends it, get more exercise. Walking is a good choice. Bit by bit, increase the amount you walk every day. Try for at least 30 minutes on most days of the week. You also may want to swim, bike, or do other activities. · Limit or avoid alcohol. Talk to your doctor about whether you can drink any alcohol. · Do not smoke or allow others to smoke around you. If you need help quitting, talk to your doctor about stop-smoking programs and medicines. These can increase your chances of quitting for good. When should you call for help? Call 911 anytime you think you may need emergency care. For example, call if:    · You passed out (lost consciousness). Call your doctor now or seek immediate medical care if:    · You have new or worse nausea and vomiting.     · You have much less urine than normal, or you have no urine.     · You are feeling confused or cannot think clearly.     · You have new or more blood in your urine.     · You have new swelling.     · You are dizzy or lightheaded, or you feel like you may faint. Watch closely for changes in your health, and be sure to contact your doctor if:    · You do not get better as expected. Where can you learn more? Go to https://[email or web address] Solutionsrosette.HealthRally. org and sign in to your [employer] account. Enter R545 in the [facility] box to learn more about \"Kidney Disease and High Blood Pressure: Care Instructions. \"     If you do not have an account, please click on the \"Sign Up Now\" link. Current as of: December 17, 2020               Content Version: 13.0  © 2792-5566 Healthwise, Incorporated. Care instructions adapted under license by [facility] ([facility]).  If you have questions about a medical condition or this instruction, always ask your healthcare professional. [facility] any warranty or liability for your use of this information. Patient Education        Medicines to Avoid With Kidney Disease: Care Instructions  Overview     Kidney disease means that your kidneys are not able to get rid of waste from the blood. So they can't keep your body's fluids and chemicals in balance. Usually, the kidneys get rid of waste from the blood through the urine. And they balance the fluids in the body. When your kidneys don't work as they should, you have to be careful about some medicines. They may harm your kidneys. Your doctor may tell you not to take them or may change the dose. Medicines for pain and swelling, such as ibuprofen (Advil or Motrin) or naproxen (Aleve), can cause harm. So can some antibiotics and antacids. And you need to be careful about some drugs that treat cancer, lower blood pressure, or get rid of water from the body. Some herbal products could cause harm too. Follow-up care is a key part of your treatment and safety. Be sure to make and go to all appointments, and call your doctor if you are having problems. It's also a good idea to know your test results and keep a list of the medicines you take. How can you care for yourself at home? · Tell your doctor all the prescription, herbal, or over-the-counter medicines you take. Do not take any new ones unless you talk to your doctor first.  · Do not take anti-inflammatory medicines. These include ibuprofen (Advil, Motrin) and naproxen (Aleve). You can use acetaminophen (Tylenol) for pain. · Do not take two or more pain medicines at the same time unless the doctor told you to. Many pain medicines have acetaminophen, which is Tylenol. Too much acetaminophen (Tylenol) can be harmful. · Tell all doctors and others who work with your health care that you have kidney disease.   · Wear medical alert jewelry that lists your health problem. You can buy this at most drugstores. Where can you learn more? Go to https://chpepiceweb.Miira. org and sign in to your [employer] account. Enter Z979 in the [facility] box to learn more about \"Medicines to Avoid With Kidney Disease: Care Instructions. \"     If you do not have an account, please click on the \"Sign Up Now\" link. Current as of: December 17, 2020               Content Version: 13.0  © 0550-4863 Healthwise, Incorporated. Care instructions adapted under license by [facility] ([facility]). If you have questions about a medical condition or this instruction, always ask your healthcare professional. [address] any warranty or liability for your use of this information.

## 2021-11-22 NOTE — PROGRESS NOTES
[facility] patients having surgery or anesthesia are required to be Covid tested OR to have been vaccinated at least 14 days prior to your procedure. It is very important to return our call to [phone] and notify the staff of your last vaccination date otherwise you will be required to complete Covid PCR test within the 5-6 days prior to surgery & quarantine. The results will need to be faxed to PreAdmission Testing at [phone]. PRIOR TO PROCEDURE DATE:        1. PLEASE FOLLOW ANY  GUIDELINES/ INSTRUCTIONS PRIOR TO YOUR PROCEDURE AS ADVISED BY YOUR SURGEON. 2. Arrange for someone to drive you home and be with you for the first 24 hours after discharge for your safety after your procedure for which you received sedation. Ensure it is someone we can share information with regarding your discharge. 3. You must contact your surgeon for instructions IF:   You are taking any blood thinners, aspirin, anti-inflammatory or vitamin E.   There is a change in your physical condition such as a cold, fever, rash, cuts, sores or any other infection, especially near your surgical site. 4. Do not drink alcohol the day before or day of your procedure. 5. A Pre-op History and Physical for surgery MUST be completed by your Physician or Urgent Care within 30 days of your procedure date. Please bring a copy with you on the day of your procedure and along with any other testing performed. THE DAY OF YOUR PROCEDURE:  1. Follow instructions for ARRIVAL TIME as DIRECTED BY YOUR SURGEON. 2. Enter the MAIN entrance from [address] [place] [address] and follow the signs to the free [employer] or [employer] parking (offered free of charge 6am-5pm). 3. Enter the Main Entrance of the hospital (do not enter from the lower level of the parking garage). Upon entrance, check in with the  at the main desk on your left. If no one is available at the desk, proceed into the [facility] Waiting Room and go through the door directly into the [facility]. There is a Check-in desk ACROSS from Room 5 (marked with a sign hanging from the ceiling). The phone number for the surgery center is [phone]. 4. Please call [phone] option #2 option #2 if you have not been preregistered yet. On the day of your procedure bring your insurance card and photo ID. You will be registered at your bedside once brought back to your room. 5. DO NOT EAT ANYTHING eight hours prior to your arrival for surgery. May have 8 ounces of water 4 hours prior to your arrival for surgery. NOTE: ALL Gastric, Bariatric and Bowel surgery patients MUST follow their surgeon's instructions. 6. MEDICATIONS    Take the following medications with a SMALL sip of water: PERCOCET   Bariatric patient's call surgeon if on diabetic medications as some need to be stopped 1 week preop   Use your usual dose of inhalers the morning of surgery. BRING your rescue inhaler with you to hospital.    Anesthesia does NOT want you to take insulin the morning of surgery. They will control your blood sugar while you are at the hospital. Please contact your ordering physician for instructions regarding your insulin the night before your procedure. If you have an insulin pump, please keep it set on basal rate. 7. Do not swallow water when brushing teeth. No gum, candy, mints or ice chips. Refrain from smoking or at least decrease the amount. 8. Dress in loose, comfortable clothing appropriate for redressing after your procedure. Do not wear jewelry (including body piercings), make-up (especially NO eye make-up), fingernail polish (NO toenail polish if foot/leg surgery), lotion, powders or metal hairclips. 9. Dentures, glasses, or contacts will need to be removed before your procedure.  Bring cases for your glasses, contacts, dentures, or hearing aids to protect them while you are in surgery. 10. If you use a CPAP, please bring it with you on the day of your procedure. 11. We recommend that valuable personal  belongings such as cash, cell phones, e-tablets or jewelry, be left at home during your stay. The hospital will not be responsible for valuables that are not secured in the hospital safe. However, if your insurance requires a co-pay, you may want to bring a method of payment, i.e. Check or credit card, if you wish to pay your co-pay the day of surgery. 12. If you are to stay overnight, you may bring a bag with personal items. Please have any large items you may need brought in by your family after your arrival to your hospital room. 15. If you have a Living Will or Durable Power of , please bring a copy on the day of your procedure. 15. With your permission, one family member may accompany you while you are being prepared for surgery. Once you are ready, additional family members may join you. HOW WE KEEP YOU SAFE and WORK TO PREVENT SURGICAL SITE INFECTIONS:  1. Health care workers should always check your ID bracelet to verify your name and birth date. You will be asked many times to state your name, date of birth, and allergies. 2. Health care workers should always clean their hands with soap or alcohol gel before providing care to you. It is okay to ask anyone if they cleaned their hands before they touch you. 3. You will be actively involved in verifying the type of procedure you are having and ensuring the correct surgical site. This will be confirmed multiple times prior to your procedure. Do NOT aisha your surgery site UNLESS instructed to by your surgeon. 4. Do not shave or wax for 72 hours prior to procedure near your operative site. Shaving with a razor can irritate your skin and make it easier to develop an infection.  On the day of your procedure, any hair that needs to be removed near the surgical site will be clipped by a healthcare worker using a special clippers designed to avoid skin irritation. 5. When you are in the operating room, your surgical site will be cleansed with a special soap, and in most cases, you will be given an antibiotic before the surgery begins. What to expect AFTER YOUR PROCEDURE:  1. Immediately following your procedure, your will be taken to the PACU for the first phase of your recovery. Your nurse will help you recover from any potential side effects of anesthesia, such as extreme drowsiness, changes in your vital signs or breathing patterns. Nausea, headache, muscle aches, or sore throat may also occur after anesthesia. Your nurse will help you manage these potential side effects. 2. For comfort and safety, arrange to have someone at home with you for the first 24 hours after discharge. 3. You and your family will be given written instructions about your diet, activity, dressing care, medications, and return visits. 4. Once at home, should issues with nausea, pain, or bleeding occur, or should you notice any signs of infection, you should call your surgeon. 5. Always clean your hands before and after caring for your wound. Do not let your family touch your surgery site without cleaning their hands. 6. Narcotic pain medications can cause significant constipation. You may want to add a stool softener to your postoperative medication schedule or speak to your surgeon on how best to manage this SIDE EFFECT. SPECIAL INSTRUCTIONS     Thank you for allowing us to care for you. We strive to exceed your expectations in the delivery of care and service provided to you and your family. If you need to contact the [place] [address] staff for any reason, please call us at [phone]    Instructions reviewed with patient during preadmission testing phone interview.   Courtney Griggs RN.11/22/2021 .12:57 PM      ADDITIONAL EDUCATIONAL INFORMATION REVIEWED PER PHONE WITH YOU AND/OR YOUR FAMILY:  No Hibiclens® Bathing Instructions   Yes Antibacterial Soap

## 2021-11-22 NOTE — PROGRESS NOTES
Place patient label inside box (if no patient label, complete below)  Name:  :  MR#:   Peter Lowe / PROCEDURE  1. I (we), Clem Martinez (Patient Name) authorize Luis Short (Provider / Lindy Burt) and/or such assistants as may be selected by him/her, to perform the following operation/procedure(s): CYSTOSCOPY, RIGHT URETEROSCOPY, HOLMIUM LASER, BIOPSY, STONE MANIPULATION, RIGHT STENT EXCHANGE. RIGHT RETROGRADE        Note: If unable to obtain consent prior to an emergent procedure, document the emergent reason in the medical record. This procedure has been explained to my (our) satisfaction and included in the explanation was:  A) The intended benefit, nature, and extent of the procedure to be performed;  B) The significant risks involved and the probability of success;  C) Alternative procedures and methods of treatment;  D) The dangers and probable consequences of such alternatives (including no procedure or treatment); E) The expected consequences of the procedure on my future health;  F) Whether other qualified individuals would be performing important surgical tasks and/or whether  would be present to advise or support the procedure. I (we) understand that there are other risks of infection and other serious complications in the pre-operative/procedural and postoperative/procedural stages of my (our) care. I (we) have asked all of the questions which I (we) thought were important in deciding whether or not to undergo treatment or diagnosis. These questions have been answered to my (our) satisfaction. I (we) understand that no assurance can be given that the procedure will be a success, and no guarantee or warranty of success has been given to me (us).     2. It has been explained to me (us) that during the course of the operation/procedure, unforeseen conditions may be revealed that necessitate extension of the original procedure(s) or different procedure(s) than those set forth in Paragraph 1. I (we) authorize and request that the above-named physician, his/her assistants or his/her designees, perform procedures as necessary and desirable if deemed to be in my (our) best interest.     Revised 8/2/2021                                                                          Page 1 of 2           3. I acknowledge that health care personnel may be observing this procedure for the purpose of medical education or other specified purposes as may be necessary as requested and/or approved by my (our) physician. 4. I (we) consent to the disposal by the hospital Pathologist of the removed tissue, parts or organs in accordance with hospital policy. 5. I do ____ do not ____ consent to the use of a local infiltration pain blocking agent that will be used by my provider/surgical provider to help alleviate pain during my procedure. 6. I do ____ do not ____ consent to an emergent blood transfusion in the case of a life-threatening situation that requires blood components to be administered. This consent is valid for 24 hours from the beginning of the procedure. 7. This patient does ____ or does not ____ currently have a DNR status/order. If DNR order is in place, obtain Addendum to the Surgical Consent for ALL Patients with a DNR Order to address stoney-operative status for limited intervention or DNR suspension.      8. I have read and fully understand the above Consent for Operation/Procedure and that all blanks were completed before I signed the consent.   _____________________________       _____________________      ____/____am/pm  Signature of Patient or legal representative      Printed Name / Relationship            Date / Time   ____________________________       _____________________      ____/____am/pm  Witness to Signature                                    Printed Name                    Date / Time     If patient is unable to sign or is a minor, complete the following)  Patient is a minor, ____ years of age, or unable to sign because:   ______________________________________________________________________________________________    [employer] If a phone consent is obtained, consent will be documented by using two health care professionals, each affirming that the consenting party has no questions and gives consent for the procedure discussed with the physician/provider.   _____________________          ____________________       _____/_____am/pm   2nd witness to phone consent        Printed name           Date / Time    Informed Consent:  I have provided the explanation described above in section 1 to the patient and/or legal representative.  I have provided the patient and/or legal representative with an opportunity to ask any questions about the proposed operation/procedure.   ___________________________          ____________________         ____/____am/pm  Provider / Proceduralist                            Printed name            Date / Time  Revised 8/2/2021                                                                      Page 2 of 2

## 2021-11-23 ENCOUNTER — ANESTHESIA EVENT (OUTPATIENT)
Dept: OPERATING ROOM | Age: 81
End: 2021-11-23
Payer: MEDICARE

## 2021-11-24 ENCOUNTER — APPOINTMENT (OUTPATIENT)
Dept: GENERAL RADIOLOGY | Age: 81
End: 2021-11-24
Attending: STUDENT IN AN ORGANIZED HEALTH CARE EDUCATION/TRAINING PROGRAM
Payer: MEDICARE

## 2021-11-24 ENCOUNTER — ANESTHESIA (OUTPATIENT)
Dept: OPERATING ROOM | Age: 81
End: 2021-11-24
Payer: MEDICARE

## 2021-11-24 ENCOUNTER — HOSPITAL ENCOUNTER (OUTPATIENT)
Age: 81
Setting detail: OUTPATIENT SURGERY
Discharge: HOME | End: 2021-11-24
Attending: STUDENT IN AN ORGANIZED HEALTH CARE EDUCATION/TRAINING PROGRAM | Admitting: STUDENT IN AN ORGANIZED HEALTH CARE EDUCATION/TRAINING PROGRAM
Payer: MEDICARE

## 2021-11-24 VITALS
TEMPERATURE: 97.1 F | SYSTOLIC BLOOD PRESSURE: 122 MMHG | HEART RATE: 72 BPM | OXYGEN SATURATION: 98 % | DIASTOLIC BLOOD PRESSURE: 64 MMHG | BODY MASS INDEX: 23.6 KG/M2 | RESPIRATION RATE: 17 BRPM | WEIGHT: 125 LBS | HEIGHT: 61 IN

## 2021-11-24 VITALS — DIASTOLIC BLOOD PRESSURE: 60 MMHG | SYSTOLIC BLOOD PRESSURE: 121 MMHG | OXYGEN SATURATION: 100 %

## 2021-11-24 PROCEDURE — 7100000010 HC PHASE II RECOVERY - FIRST 15 MIN: Performed by: STUDENT IN AN ORGANIZED HEALTH CARE EDUCATION/TRAINING PROGRAM

## 2021-11-24 PROCEDURE — 6360000002 HC RX W HCPCS: Performed by: STUDENT IN AN ORGANIZED HEALTH CARE EDUCATION/TRAINING PROGRAM

## 2021-11-24 PROCEDURE — 74420 UROGRAPHY RTRGR +-KUB: CPT

## 2021-11-24 PROCEDURE — C1769 GUIDE WIRE: HCPCS | Performed by: STUDENT IN AN ORGANIZED HEALTH CARE EDUCATION/TRAINING PROGRAM

## 2021-11-24 PROCEDURE — 2709999900 HC NON-CHARGEABLE SUPPLY: Performed by: STUDENT IN AN ORGANIZED HEALTH CARE EDUCATION/TRAINING PROGRAM

## 2021-11-24 PROCEDURE — 7100000000 HC PACU RECOVERY - FIRST 15 MIN: Performed by: STUDENT IN AN ORGANIZED HEALTH CARE EDUCATION/TRAINING PROGRAM

## 2021-11-24 PROCEDURE — 3600000004 HC SURGERY LEVEL 4 BASE: Performed by: STUDENT IN AN ORGANIZED HEALTH CARE EDUCATION/TRAINING PROGRAM

## 2021-11-24 PROCEDURE — 7100000011 HC PHASE II RECOVERY - ADDTL 15 MIN: Performed by: STUDENT IN AN ORGANIZED HEALTH CARE EDUCATION/TRAINING PROGRAM

## 2021-11-24 PROCEDURE — 6360000002 HC RX W HCPCS: Performed by: NURSE ANESTHETIST, CERTIFIED REGISTERED

## 2021-11-24 PROCEDURE — C1758 CATHETER, URETERAL: HCPCS | Performed by: STUDENT IN AN ORGANIZED HEALTH CARE EDUCATION/TRAINING PROGRAM

## 2021-11-24 PROCEDURE — 2580000003 HC RX 258: Performed by: ANESTHESIOLOGY

## 2021-11-24 PROCEDURE — 7100000001 HC PACU RECOVERY - ADDTL 15 MIN: Performed by: STUDENT IN AN ORGANIZED HEALTH CARE EDUCATION/TRAINING PROGRAM

## 2021-11-24 PROCEDURE — 6370000000 HC RX 637 (ALT 250 FOR IP): Performed by: ANESTHESIOLOGY

## 2021-11-24 PROCEDURE — 3700000000 HC ANESTHESIA ATTENDED CARE: Performed by: STUDENT IN AN ORGANIZED HEALTH CARE EDUCATION/TRAINING PROGRAM

## 2021-11-24 PROCEDURE — 3700000001 HC ADD 15 MINUTES (ANESTHESIA): Performed by: STUDENT IN AN ORGANIZED HEALTH CARE EDUCATION/TRAINING PROGRAM

## 2021-11-24 PROCEDURE — 6360000004 HC RX CONTRAST MEDICATION: Performed by: STUDENT IN AN ORGANIZED HEALTH CARE EDUCATION/TRAINING PROGRAM

## 2021-11-24 PROCEDURE — 3600000014 HC SURGERY LEVEL 4 ADDTL 15MIN: Performed by: STUDENT IN AN ORGANIZED HEALTH CARE EDUCATION/TRAINING PROGRAM

## 2021-11-24 RX ORDER — FENTANYL CITRATE 50 UG/ML
INJECTION, SOLUTION INTRAMUSCULAR; INTRAVENOUS PRN
Status: DISCONTINUED | OUTPATIENT
Start: 2021-11-24 | End: 2021-11-24 | Stop reason: SDUPTHER

## 2021-11-24 RX ORDER — SODIUM CHLORIDE, SODIUM LACTATE, POTASSIUM CHLORIDE, CALCIUM CHLORIDE 600; 310; 30; 20 MG/100ML; MG/100ML; MG/100ML; MG/100ML
INJECTION, SOLUTION INTRAVENOUS CONTINUOUS
Status: DISCONTINUED | OUTPATIENT
Start: 2021-11-24 | End: 2021-11-24 | Stop reason: HOSPADM

## 2021-11-24 RX ORDER — ONDANSETRON 2 MG/ML
4 INJECTION INTRAMUSCULAR; INTRAVENOUS
Status: DISCONTINUED | OUTPATIENT
Start: 2021-11-24 | End: 2021-11-24 | Stop reason: HOSPADM

## 2021-11-24 RX ORDER — CIPROFLOXACIN 2 MG/ML
400 INJECTION, SOLUTION INTRAVENOUS ONCE
Status: COMPLETED | OUTPATIENT
Start: 2021-11-24 | End: 2021-11-24

## 2021-11-24 RX ORDER — PROPOFOL 10 MG/ML
INJECTION, EMULSION INTRAVENOUS PRN
Status: DISCONTINUED | OUTPATIENT
Start: 2021-11-24 | End: 2021-11-24 | Stop reason: SDUPTHER

## 2021-11-24 RX ORDER — PROMETHAZINE HYDROCHLORIDE 25 MG/ML
6.25 INJECTION, SOLUTION INTRAMUSCULAR; INTRAVENOUS
Status: DISCONTINUED | OUTPATIENT
Start: 2021-11-24 | End: 2021-11-24 | Stop reason: HOSPADM

## 2021-11-24 RX ORDER — LIDOCAINE HYDROCHLORIDE 20 MG/ML
INJECTION, SOLUTION INTRAVENOUS PRN
Status: DISCONTINUED | OUTPATIENT
Start: 2021-11-24 | End: 2021-11-24 | Stop reason: SDUPTHER

## 2021-11-24 RX ORDER — OXYCODONE HYDROCHLORIDE AND ACETAMINOPHEN 5; 325 MG/1; MG/1
1 TABLET ORAL
Status: COMPLETED | OUTPATIENT
Start: 2021-11-24 | End: 2021-11-24

## 2021-11-24 RX ORDER — FENTANYL CITRATE 50 UG/ML
25 INJECTION, SOLUTION INTRAMUSCULAR; INTRAVENOUS EVERY 5 MIN PRN
Status: DISCONTINUED | OUTPATIENT
Start: 2021-11-24 | End: 2021-11-24 | Stop reason: HOSPADM

## 2021-11-24 RX ADMIN — SODIUM CHLORIDE, POTASSIUM CHLORIDE, SODIUM LACTATE AND CALCIUM CHLORIDE: 600; 310; 30; 20 INJECTION, SOLUTION INTRAVENOUS at 11:52

## 2021-11-24 RX ADMIN — FENTANYL CITRATE 50 MCG: 50 INJECTION, SOLUTION INTRAMUSCULAR; INTRAVENOUS at 13:22

## 2021-11-24 RX ADMIN — PROPOFOL 30 MG: 10 INJECTION, EMULSION INTRAVENOUS at 13:29

## 2021-11-24 RX ADMIN — OXYCODONE HYDROCHLORIDE AND ACETAMINOPHEN 1 TABLET: 5; 325 TABLET ORAL at 14:37

## 2021-11-24 RX ADMIN — PROPOFOL 100 MG: 10 INJECTION, EMULSION INTRAVENOUS at 13:23

## 2021-11-24 RX ADMIN — CIPROFLOXACIN 400 MG: 2 INJECTION, SOLUTION INTRAVENOUS at 13:22

## 2021-11-24 RX ADMIN — LIDOCAINE HYDROCHLORIDE 50 MG: 20 INJECTION, SOLUTION INTRAVENOUS at 13:22

## 2021-11-24 ASSESSMENT — PULMONARY FUNCTION TESTS
PIF_VALUE: 10
PIF_VALUE: 2
PIF_VALUE: 1
PIF_VALUE: 18
PIF_VALUE: 10
PIF_VALUE: 10
PIF_VALUE: 11
PIF_VALUE: 10
PIF_VALUE: 1
PIF_VALUE: 10
PIF_VALUE: 10
PIF_VALUE: 15
PIF_VALUE: 1
PIF_VALUE: 11
PIF_VALUE: 4
PIF_VALUE: 10
PIF_VALUE: 2
PIF_VALUE: 10
PIF_VALUE: 11
PIF_VALUE: 10
PIF_VALUE: 1
PIF_VALUE: 1
PIF_VALUE: 15
PIF_VALUE: 3
PIF_VALUE: 10

## 2021-11-24 ASSESSMENT — PAIN DESCRIPTION - ORIENTATION
ORIENTATION: LOWER
ORIENTATION: LOWER

## 2021-11-24 ASSESSMENT — PAIN SCALES - GENERAL
PAINLEVEL_OUTOF10: 2
PAINLEVEL_OUTOF10: 6
PAINLEVEL_OUTOF10: 0
PAINLEVEL_OUTOF10: 4

## 2021-11-24 ASSESSMENT — PAIN DESCRIPTION - LOCATION
LOCATION: BACK
LOCATION: BACK

## 2021-11-24 ASSESSMENT — COPD QUESTIONNAIRES: CAT_SEVERITY: MILD

## 2021-11-24 ASSESSMENT — PAIN DESCRIPTION - DESCRIPTORS
DESCRIPTORS: ACHING
DESCRIPTORS: ACHING

## 2021-11-24 ASSESSMENT — PAIN DESCRIPTION - PAIN TYPE
TYPE: CHRONIC PAIN
TYPE: CHRONIC PAIN

## 2021-11-24 ASSESSMENT — PAIN DESCRIPTION - FREQUENCY: FREQUENCY: CONTINUOUS

## 2021-11-24 ASSESSMENT — LIFESTYLE VARIABLES: SMOKING_STATUS: 1

## 2021-11-24 NOTE — H&P
Sussy Pak    [id number]    [facility] Same Day Surgery Update H & P  Department of General Surgery   Surgical Service   Pre-operative History and Physical  Last H & P within the last 30 days. 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 RIGHT RETROGRADE    History obtained from: Patient interview and EHR      HISTORY OF PRESENT ILLNESS:   Patient is a 81 y/o female with history of urothelial carcinoma, hydronephrosis and ureteral obstruction, who presents today for the above procedures. Covid 19:  Patient denies fever, chills, worsening cough, or known exposure to Covid-19. Past Medical History:        Diagnosis Date    Anxiety     Cancer 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 Yue Gaviria - recheck 5 yrs    CYSTOSCOPY      CYSTOSCOPY Right 4/21/2021    CYSTOSCOPY, RIGHT URETEROSCOPY, WITH RETROGRADE, RIGHT URETERAL BIOPSY,  RIGHT STENT EXCHANGE performed by Teddy Thorne MD at [place] [address] Right 7/20/2021    CYSTOSCOPY, RIGHT URETEROSCOPY WITH BIOPSY / LASER RESECTION OF TUMOR performed by Teddy Thorne MD at [facility] ERCP  5/8/2015    sphincter and stent    EYE SURGERY Bilateral     cataract removal    MOUTH SURGERY      WRIST FRACTURE SURGERY Left 12/23/2016    OPEN REDUCTION INTERNAL FIXATION LEFT DISTAL RADIUS FRACTURE     Past Social History:  Social History     Socioeconomic History    Marital status:       Spouse name: None    Number of children: 0    Years of education: None    Highest education level: None   Occupational History    None   Tobacco Use    Smoking status: Current Every Day Smoker     Packs/day: 1.00     Years: 50.00     Pack years: 50.00     Types: Cigarettes     Start date: 5/1/1958    Smokeless tobacco: Never Used   Vaping Use    Vaping Use: Never used   Substance and Sexual Activity    Alcohol use: No     Alcohol/week: 0.0 standard drinks    Drug use: Never    Sexual activity: Never   Other Topics Concern    None   Social History Narrative    None     Social Determinants of Health     Financial Resource Strain: Low Risk     Difficulty of Paying Living Expenses: Not hard at all   Food Insecurity: No Food Insecurity    Worried About Running Out of Food in the Last Year: Never true    Jeanette of Food in the Last Year: Never true   Transportation Needs: No Transportation Needs    Lack of Transportation (Medical): No    Lack of Transportation (Non-Medical):  No   Physical Activity:     Days of Exercise per Week: Not on file    Minutes of Exercise per Session: Not on file   Stress:     Feeling of Stress : Not on file   Social Connections:     Frequency of Communication with Friends and Family: Not on file    Frequency of Social Gatherings with Friends and Family: Not on file    Attends [employer]ism Services: Not on file    Active Member of [address] [place] [address] [employer] or Organizations: Not on file    Attends Club or Organization Meetings: Not on file    Marital Status: Not on file   Intimate Partner Violence:     Fear of Current or Ex-Partner: Not on file    Emotionally Abused: Not on file    Physically Abused: Not on file    Sexually Abused: Not on file   Housing Stability:     Unable to Pay for Housing in the Last Year: Not on file    Number of [place] in the Last Year: Not on file    Unstable Housing in the Last Year: Not on file         Medications Prior to Admission:      Prior to Admission medications    Medication Sig Start Date End Date Taking? Authorizing Provider   furosemide (LASIX) 40 MG tablet Take 0.5 tablets by mouth daily 11/1/21  Yes Nataliia Lauren MD   mirtazapine (REMERON) 15 MG tablet Take 1 tablet by mouth nightly 7/27/21  Yes Boyd Fischer MD   oxyCODONE-acetaminophen (PERCOCET) 7.5-325 MG per tablet Take 1 tablet by mouth every 8 hours as needed for Pain (chronic back pain. ). Chronic back pain. 7/19/21 7/19/22 Yes Sotero Meyer MD   hydrOXYzine (ATARAX) 50 MG tablet Take 1 tablet by mouth 3 times daily as needed for Anxiety 5/7/21  Yes Sotero Meyer MD         Allergies:  Penicillins    PHYSICAL EXAM:      /60   Pulse 70   Temp 98.1 °F (36.7 °C) (Temporal)   Resp 16   Ht 5' 1\" (1.549 m)   Wt 125 lb (56.7 kg)   SpO2 99%   BMI 23.62 kg/m²      Airway:  Airway patent with no audible stridor    Heart:  Regular rate and rhythm, No murmur noted    Lungs:  No increased work of breathing, good air exchange, clear to auscultation bilaterally, no crackles or wheezing    Abdomen:  Soft, non-distended, non-tender, no rebound tenderness or guarding, and no masses palpated    ASSESSMENT AND PLAN     Patient is a 80 y.o. female with above specified procedure planned. 1.  The patients history and physical was obtained and signed off by the pre-admission testing department. Patient seen and focused exam done today- no new changes since last physical exam on 11/1/2021.    2.  Access to ancillary services are available per request of the provider.     BISHNU Duarte - CNP     11/24/2021

## 2021-11-24 NOTE — PROGRESS NOTES
Patient received from PACU, patient alert and oriented. No needs at this time. Call light within reach.

## 2021-11-24 NOTE — ANESTHESIA PRE PROCEDURE
Department of Anesthesiology  Preprocedure Note       Name:  Juliette Conley   Age:  80 y.o.  :  1940                                          MRN:  [id number]         Date:  2021      Surgeon: Angel Rodriguez):  Saturnino Wong MD    Procedure: Procedure(s):  CYSTOSCOPY, RIGHT URETEROSCOPY, HOLMIUM LASER, BIOPSY, STONE MANIPULATION,  RIGHT STENT EXCHANGE. RIGHT RETROGRADE    Medications prior to admission:   Prior to Admission medications    Medication Sig Start Date End Date Taking? Authorizing Provider   furosemide (LASIX) 40 MG tablet Take 0.5 tablets by mouth daily 21  Yes Denis Swanson MD   mirtazapine (REMERON) 15 MG tablet Take 1 tablet by mouth nightly 21  Yes Kennedi Ivan MD   oxyCODONE-acetaminophen (PERCOCET) 7.5-325 MG per tablet Take 1 tablet by mouth every 8 hours as needed for Pain (chronic back pain. ). Chronic back pain. 21 Yes Jose Luis Gaytan MD   hydrOXYzine (ATARAX) 50 MG tablet Take 1 tablet by mouth 3 times daily as needed for Anxiety 21  Yes Jose Luis Gaytan MD       Current medications:    Current Facility-Administered Medications   Medication Dose Route Frequency Provider Last Rate Last Admin    ciprofloxacin (CIPRO) IVPB 400 mg  400 mg IntraVENous Once Saturnino Wong MD        lactated ringers infusion   IntraVENous Continuous Alexa Lizama,  mL/hr at 21 1152 New Bag at 21 1152       Allergies: Allergies   Allergen Reactions    Penicillins Swelling     Swelling in vaginal area only  Vaginal swelling -- Pt cannot remember all symptoms;       Problem List:    Patient Active Problem List   Diagnosis Code    Anxiety and depression F41.9, F32. A    Colon polyps K63.5    Tremor R25.1    Cigarette smoker F17.210    Hyperlipidemia E78.5    Essential hypertension I10    Thyroid nodule E04.1    Chronic bilateral low back pain without sciatica M54.50, G89.29    Abnormal CT of the abdomen R93.5    Renal mass, right N28.89    Chronic right flank pain R10.9, G89.29    Chronic, continuous use of opioids F11.90    Spinal stenosis of lumbar region with neurogenic claudication M48.062    Other osteoporosis without current pathological fracture M81.8    Other emphysema ([facility]) J43.8    CVA (cerebral vascular accident) ([address] I63.9    Sleep difficulties G47.9    Acute renal failure superimposed on stage 3a chronic kidney disease (HCC) N17.9, N18.31    Hydronephrosis N13.30    Urothelial carcinoma of right distal ureter ([address] C66.1    Fall at home, sequela W19. XXXS, Y92.009    Acidosis E87.2    Hyperkalemia E87.5    Scalp laceration S01. 01XA    Acute encephalopathy G93.40    Acute UTI N39.0    Hypotension due to hypovolemia I95.89, E86.1    Falls frequently R29.6    CHAPO (acute kidney injury) ([address] N17.9    Acute kidney injury ([address] N17.9    Neck abscess L02.11    Chronic fatigue R53.82    CKD (chronic kidney disease) stage 4, GFR 15-29 ml/min ([facility]) N18.4       Past Medical History:        Diagnosis Date    Anxiety     Cancer [facility])     RIGHT URETER    Colon polyps     check q5yrs    CVA (cerebral vascular accident) ([address] 10/12/2018    Lacunar, left hemisphere    HTN (hypertension) 10/2011    Hyperlipidemia     Kidney stones     Neurologic gait dysfunction     Proteinuria 10/2011    Screening mammogram 2010    benign    Ureteral lesion, native, right        Past Surgical History:        Procedure Laterality Date    COLONOSCOPY  2010    Dr. Татьяна Paez - recheck 5 yrs    CYSTOSCOPY      CYSTOSCOPY Right 4/21/2021    CYSTOSCOPY, RIGHT URETEROSCOPY, WITH RETROGRADE, RIGHT URETERAL BIOPSY,  RIGHT STENT EXCHANGE performed by Simran Cueva MD at [place] [address] Right 7/20/2021    CYSTOSCOPY, RIGHT URETEROSCOPY WITH BIOPSY / LASER RESECTION OF TUMOR performed by Simran Cueva MD at [address] [place] [address] ERCP  5/8/2015    sphincter and stent    EYE SURGERY Bilateral     cataract removal    MOUTH SURGERY      WRIST FRACTURE SURGERY Left 12/23/2016    OPEN REDUCTION INTERNAL FIXATION LEFT DISTAL RADIUS FRACTURE       Social History:    Social History     Tobacco Use    Smoking status: Current Every Day Smoker     Packs/day: 1.00     Years: 50.00     Pack years: 50.00     Types: Cigarettes     Start date: 5/1/1958    Smokeless tobacco: Never Used   Substance Use Topics    Alcohol use: No     Alcohol/week: 0.0 standard drinks                                Ready to quit: Not Answered  Counseling given: Not Answered      Vital Signs (Current):   Vitals:    11/22/21 1251 11/24/21 1133   BP:  120/60   Pulse:  70   Resp:  16   Temp:  98.1 °F (36.7 °C)   TempSrc:  Temporal   SpO2:  99%   Weight: 125 lb (56.7 kg) 125 lb (56.7 kg)   Height: 5' 1\" (1.549 m) 5' 1\" (1.549 m)                                              BP Readings from Last 3 Encounters:   11/24/21 120/60   11/01/21 132/70   07/27/21 127/72       NPO Status: Time of last liquid consumption: 2100                        Time of last solid consumption: 2100                        Date of last liquid consumption: 11/23/21                        Date of last solid food consumption: 11/23/21    BMI:   Wt Readings from Last 3 Encounters:   11/24/21 125 lb (56.7 kg)   11/01/21 122 lb (55.3 kg)   07/24/21 125 lb (56.7 kg)     Body mass index is 23.62 kg/m².     CBC:   Lab Results   Component Value Date    WBC 7.2 10/15/2021    WBC 7.1 07/27/2021    RBC 4.10 10/15/2021    HGB 12.4 10/15/2021    HCT 38.0 10/15/2021    MCV 93 10/15/2021    RDW 14.2 10/15/2021     10/15/2021     07/27/2021       CMP:   Lab Results   Component Value Date     10/15/2021    K 4.1 10/15/2021    K 5.5 07/24/2021     10/15/2021    CO2 26 10/15/2021    BUN 74 10/15/2021    CREATININE 1.8 10/15/2021    GFRAA 52 07/27/2021    GFRAA >60 03/27/2013    AGRATIO 1.2 06/26/2021    LABGLOM 43 07/27/2021    GLUCOSE 73 10/15/2021    PROT 7.5 06/26/2021    PROT 7.4 10/02/2012    CALCIUM 9.3 10/15/2021 BILITOT <0.2 06/26/2021    ALKPHOS 74 06/26/2021    AST 16 06/26/2021    ALT 12 06/26/2021       POC Tests: No results for input(s): POCGLU, POCNA, POCK, POCCL, POCBUN, POCHEMO, POCHCT in the last 72 hours. Coags:   Lab Results   Component Value Date    PROTIME 10.9 12/18/2020    INR 0.94 12/18/2020       HCG (If Applicable): No results found for: PREGTESTUR, PREGSERUM, HCG, HCGQUANT     ABGs:   Lab Results   Component Value Date    PHART 7.327 06/26/2021    PO2ART 63.9 06/26/2021    FOK5DKT 33.5 06/26/2021    KKI5OTW 18 06/26/2021    BEART -7.6 06/26/2021    Y3FHHIRF 94 06/26/2021        Type & Screen (If Applicable):  No results found for: LABABO, LABRH    Drug/Infectious Status (If Applicable):  No results found for: HIV, HEPCAB    COVID-19 Screening (If Applicable):   Lab Results   Component Value Date    COVID19 Not Detected 04/16/2021           Anesthesia Evaluation  Patient summary reviewed and Nursing notes reviewed  Airway: Mallampati: II  TM distance: >3 FB   Neck ROM: full  Mouth opening: > = 3 FB Dental: normal exam         Pulmonary:normal exam  breath sounds clear to auscultation  (+) COPD: mild,  current smoker          Patient smoked on day of surgery. Cardiovascular:Negative CV ROS  Exercise tolerance: good (>4 METS),   (+) hypertension: mild,         Rhythm: regular  Rate: normal           Beta Blocker:  Not on Beta Blocker         Neuro/Psych:   (+) CVA: residual symptoms, psychiatric history:            GI/Hepatic/Renal: Neg GI/Hepatic/Renal ROS            Endo/Other: Negative Endo/Other ROS                    Abdominal:       Abdomen: soft. Vascular: negative vascular ROS. Other Findings:             Anesthesia Plan      general     ASA 3       Induction: intravenous. MIPS: Postoperative opioids intended and Prophylactic antiemetics administered. Anesthetic plan and risks discussed with patient.     Use of blood products discussed with patient whom consented to blood products. Plan discussed with attending and CRNA.     Attending anesthesiologist reviewed and agrees with Preprocedure content              Margi Do DO   11/24/2021

## 2021-11-24 NOTE — PROGRESS NOTES
Ambulatory Surgery/Procedure Discharge Note    Vitals:    11/24/21 1513   BP: 122/64   Pulse: 72   Resp: 17   Temp: 97.1 °F (36.2 °C)   SpO2: 98%       No intake/output data recorded. Restroom use offered before discharge. yes  Pain assessment: Pain Level: 2        Patient discharged to home/self care.  Patient discharged via wheel chair by transporter to waiting family/S.O.       11/24/2021 3:48 PM

## 2021-11-24 NOTE — OP NOTE
Operative Note      Patient: Lion Brown  YOB: 1940  MRN: [id number]    Date of Procedure: 11/24/2021    Pre-Op Diagnosis: Malignant neoplasm of right ureter ([address] [C66.1]    Post-Op Diagnosis: Same       Procedure(s):  CYSTOSCOPY, DIAGNOSTIC, RIGHT URETEROSCOPY, RIGHT STENT REMOVAL, RIGHT RETROGRADE PYELOGRAM    Surgeon(s):  Ana Auguste MD    Assistant:   * No surgical staff found *    Anesthesia: General    Estimated Blood Loss (mL): Minimal    Complications: None    Specimens:   * No specimens in log *    Implants:  * No implants in log *      Drains: * No LDAs found *    Indications:  80 y.o. female who originally presented with right hydroureteronephrosis. She was evaluated by Dr. Katia Mclaughlin and found to have a large right ureteral tumor. 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 She desired an additional attempt at endoscopic resection and had repeat ureteral biopsy on 4/21/21 with me. This was also an incomplete resection. Pathology was upgraded to [address] [facility]. Given her other medical issues she wished to avoid any major surgery and instead try to remove the tumor piecemeal if necessary. She underwent R URS with laser excision of the ureteral tumor on 7/20/21. Pathology for this was focally HG with a small foci suspicious for invasion. It took longer than expected for this to be scheduled but she returns today for a second look. She agreed to undergo the above named procedure after discussion of the alternatives, risks and benefits. Informed consent was obtained.       Findings:  Cystoscopy revealed no tumors, stones or other mucosal abnormalities. Ureteroscopy revealed the large right mid ureteral tumor to be gone. At the site of the prior tumor location, there was some slight residual flat papillary disease present. This was slightly erythematous as well. This area appeared wide open.    Retrograde pyelogram revealed a mild-to-moderately dilated system with no obvious filling defects in the renal pelvis or calyces. There was quick washout of contrast from the proximal ureter.      Procedure: The patient was taken to the operating room and placed supine on the operating table. Pre-operative antibiotics were administered. Bilateral lower extremity SCDs were placed. After induction of general anesthesia the patient was positioned in dorsal lithotomy, prepped and draped in a sterile fashion. A time-out was performed.       A 21 Vietnamese rigid cystoscope was passed carefully via urethra into the bladder. The right ureteral stent was identified and brought to the meatus with a grasper. A Sensor wire was passed through the stent to the kidney and the stent was discarded. The semirigid ureteroscope was passed carefully along the Sensor wire through the urethra and into the distal ureter. The flat tumor was encountered but was left alone. The scope was advanced to the proximal ureter. A retrograde pyelogram was performed by slowly injecting contrast through the scope with findings described above. The scope and wire were removed. The contrast excretion was observed fluoroscopically. The bladder was emptied and the procedure was complete. The patient tolerated the procedure well and was stable throughout.     I was present in the procedure room the entire duration of the case.     Plan:  -Discharge home today  -Plan on follow up office visit, next available to discuss Ashlee       Electronically signed by Vanna Vaughn MD on 11/24/2021 at 2:01 PM

## 2021-11-24 NOTE — BRIEF OP NOTE
Brief Postoperative Note      Patient: Brandon Arroyo  YOB: 1940  MRN: [id number]    Date of Procedure: 11/24/2021    Pre-Op Diagnosis: Malignant neoplasm of right ureter ([address] [C66.1]    Post-Op Diagnosis: Same       Procedure(s):  CYSTOSCOPY, DIAGNOSTIC, RIGHT URETEROSCOPY, RIGHT STENT REMOVAL, RIGHT RETROGRADE PYELOGRAM    Surgeon(s):  Simran Cueva MD    Assistant:  * No surgical staff found *    Anesthesia: General    Estimated Blood Loss (mL): Minimal    Complications: None    Specimens:   * No specimens in log *    Implants:  * No implants in log *      Drains: * No LDAs found *    Findings: large tumor is gone; small residual flat disease.  Stent removed    Electronically signed by Simran Cueva MD on 11/24/2021 at 1:57 PM

## 2021-11-24 NOTE — ANESTHESIA POSTPROCEDURE EVALUATION
Department of Anesthesiology  Postprocedure Note    Patient: Amos Schmitz  MRN: [id number]  YOB: 1940  Date of evaluation: 11/24/2021  Time:  3:19 PM     Procedure Summary     Date: 11/24/21 Room / Location: [address] [place] [address]    Anesthesia Start: 2778 Anesthesia Stop: 8871    Procedure: CYSTOSCOPY, DIAGNOSTIC, RIGHT URETEROSCOPY, RIGHT STENT REMOVAL, RIGHT RETROGRADE PYELOGRAM (Right ) Diagnosis:       Malignant neoplasm of right ureter ([address]      (Malignant neoplasm of right ureter ([address] [C66.1])    Surgeons: Rebekah Bateman MD Responsible Provider: Andres Moran DO    Anesthesia Type: general ASA Status: 3          Anesthesia Type: general    Cornell Phase I: Cornell Score: 9    Cornell Phase II:      Last vitals: Reviewed and per EMR flowsheets.        Anesthesia Post Evaluation    Patient location during evaluation: PACU  Patient participation: complete - patient participated  Level of consciousness: awake  Pain score: 2  Airway patency: patent  Nausea & Vomiting: no nausea and no vomiting  Complications: no  Cardiovascular status: hemodynamically stable  Respiratory status: acceptable  Hydration status: euvolemic

## 2021-12-08 ENCOUNTER — TELEPHONE (OUTPATIENT)
Dept: CT IMAGING | Age: 81
End: 2021-12-08

## 2021-12-08 DIAGNOSIS — R91.1 PULMONARY NODULE: Primary | ICD-10-CM

## 2021-12-08 NOTE — TELEPHONE ENCOUNTER
Dr. Babs Velasquez,    I wanted to bring to your attention the results from [facility] on 6/26/21 during pt hospitalization for possible nodule follow-up. Impression       No evidence of acute cervical spine injury.       6 mm nodular opacity in the right upper lobe.        Thank you,  Cassandra Michael  Lung Nodule Navigator  [facility] [employer]  [phone]

## 2021-12-10 NOTE — TELEPHONE ENCOUNTER
[facility] does not have an actual nodule clinic. Occasionally PCP will consult [facility] for management. I track pts at [facility] with incidental nodules >6mm to try to ensure pt and providers are aware of follow-up, but yourself or pulm needs to write the orders.       Thanks,  Alie Platt  Lung Nodule Navigator  The [facility] [employer]  [phone]

## 2021-12-15 ENCOUNTER — TELEPHONE (OUTPATIENT)
Dept: CT IMAGING | Age: 81
End: 2021-12-15

## 2021-12-15 NOTE — TELEPHONE ENCOUNTER
New pulmonology referral placed by Dr. Lucie Barba. Contacted PCP office to f/u if they had notified pt of new referral, they requested that this navigator contact pt. Call placed to pt and discussed nodule found on CT 6/26/21 and new referral from Dr. Lucie Barba for [facility] [phone]. Pt verbalized understanding and stated that she would call the pulmonary office herself to schedule.      Octavio LEONARDN, RN   Lung Nodule Navigator  [facility]  [phone]

## 2022-01-07 ENCOUNTER — OFFICE VISIT (OUTPATIENT)
Dept: PULMONOLOGY | Age: 82
End: 2022-01-07
Payer: MEDICARE

## 2022-01-07 VITALS
TEMPERATURE: 96.5 F | DIASTOLIC BLOOD PRESSURE: 67 MMHG | BODY MASS INDEX: 23.79 KG/M2 | OXYGEN SATURATION: 100 % | SYSTOLIC BLOOD PRESSURE: 147 MMHG | HEIGHT: 61 IN | HEART RATE: 70 BPM | WEIGHT: 126 LBS

## 2022-01-07 DIAGNOSIS — R91.1 PULMONARY NODULE: Primary | ICD-10-CM

## 2022-01-07 DIAGNOSIS — J43.8 OTHER EMPHYSEMA (HCC): ICD-10-CM

## 2022-01-07 PROCEDURE — 1090F PRES/ABSN URINE INCON ASSESS: CPT | Performed by: INTERNAL MEDICINE

## 2022-01-07 PROCEDURE — G8427 DOCREV CUR MEDS BY ELIG CLIN: HCPCS | Performed by: INTERNAL MEDICINE

## 2022-01-07 PROCEDURE — 4004F PT TOBACCO SCREEN RCVD TLK: CPT | Performed by: INTERNAL MEDICINE

## 2022-01-07 PROCEDURE — 1123F ACP DISCUSS/DSCN MKR DOCD: CPT | Performed by: INTERNAL MEDICINE

## 2022-01-07 PROCEDURE — G8484 FLU IMMUNIZE NO ADMIN: HCPCS | Performed by: INTERNAL MEDICINE

## 2022-01-07 PROCEDURE — 99204 OFFICE O/P NEW MOD 45 MIN: CPT | Performed by: INTERNAL MEDICINE

## 2022-01-07 PROCEDURE — 4040F PNEUMOC VAC/ADMIN/RCVD: CPT | Performed by: INTERNAL MEDICINE

## 2022-01-07 PROCEDURE — G8399 PT W/DXA RESULTS DOCUMENT: HCPCS | Performed by: INTERNAL MEDICINE

## 2022-01-07 PROCEDURE — G8420 CALC BMI NORM PARAMETERS: HCPCS | Performed by: INTERNAL MEDICINE

## 2022-01-07 PROCEDURE — 3023F SPIROM DOC REV: CPT | Performed by: INTERNAL MEDICINE

## 2022-01-07 NOTE — PROGRESS NOTES
[facility] Pulmonary and Critical Care    Outpatient Note    Subjective:   CHIEF COMPLAINT: No chief complaint on file. HPI:     The patient is 80 y.o. female who presents today for a new patient visit for ***. Past Medical History:    Past Medical History:   Diagnosis Date    Anxiety     Cancer [facility])     RIGHT URETER    Colon polyps     check q5yrs    CVA (cerebral vascular accident) ([address] 10/12/2018    Lacunar, left hemisphere    HTN (hypertension) 10/2011    Hyperlipidemia     Kidney stones     Neurologic gait dysfunction     Proteinuria 10/2011    Screening mammogram 2010    benign    Ureteral lesion, native, right        Social History:    Social History     Tobacco Use   Smoking Status Current Every Day Smoker    Packs/day: 1.00    Years: 50.00    Pack years: 50.00    Types: Cigarettes    Start date: 5/1/1958   Smokeless Tobacco Never Used       Family History:  Family History   Problem Relation Age of Onset    Diabetes Mother     Heart Disease Father     Cancer Brother     Kidney Disease Brother        Current Medications:  Current Outpatient Medications on File Prior to Visit   Medication Sig Dispense Refill    furosemide (LASIX) 40 MG tablet Take 0.5 tablets by mouth daily 30 tablet 3    oxyCODONE-acetaminophen (PERCOCET) 7.5-325 MG per tablet Take 1 tablet by mouth every 8 hours as needed for Pain (chronic back pain. ). Chronic back pain. 90 tablet 0    hydrOXYzine (ATARAX) 50 MG tablet Take 1 tablet by mouth 3 times daily as needed for Anxiety 90 tablet 3    mirtazapine (REMERON) 15 MG tablet Take 1 tablet by mouth nightly (Patient not taking: Reported on 1/7/2022) 30 tablet 3     No current facility-administered medications on file prior to visit.        REVIEW OF SYSTEMS:    Review of Systems      Objective:   PHYSICAL EXAM:        VITALS:  BP (!) 147/67 (Site: Left Upper Arm, Position: Sitting, Cuff Size: Medium Adult)   Pulse 70   Temp 96.5 °F (35.8 °C) (Infrared)   Ht 5' 1\" (1.549 m)   Wt 126 lb (57.2 kg)   SpO2 100%   Breastfeeding No   BMI 23.81 kg/m²     Physical Exam    DATA:      Radiology Review:  Pertinent images/ reports were reviewed as a part of this visit. reveals the following: ***    Pulmonary Functions Testing Results:    No results found for: FEV1, FVC, NNV1KBS, TLC, DLCO    Assessment:   {No diagnosis found. (Refresh or delete this SmartLink)}    Plan:   1.  2. See above for last PFT data. FEV1 is ***. Repeat testing {IS:19891} required at this time. 3. The patient {IS:19891} currently smoking. The risks related to smokingwere reviewed with the patient. Recommend maintaining a smoke-free lifestyle. Products available for smoking cessation were discussed in detail. 4. As above, the patient {IS:19891} compliant w/ inhaled bronchodilatortherapy. Continue current inhaled therapy as follows: ***  5. Repeat imaging / lab testing {IS:19891} required at this time. See orders. 6. RTC {NUMBER 1-10:[id number]} {gen duration 2:775780} w/ MD. Call or RTCsooner if symptoms persist or worsen acutely.

## 2022-01-10 ASSESSMENT — ENCOUNTER SYMPTOMS
WHEEZING: 0
SHORTNESS OF BREATH: 0
CHEST TIGHTNESS: 0
COUGH: 0

## 2022-02-08 ENCOUNTER — TELEPHONE (OUTPATIENT)
Dept: CT IMAGING | Age: 82
End: 2022-02-08

## 2022-03-08 ENCOUNTER — TELEPHONE (OUTPATIENT)
Dept: CT IMAGING | Age: 82
End: 2022-03-08

## 2022-03-08 NOTE — TELEPHONE ENCOUNTER
Pt due for follow-up CT Chest as ordered by Dr. Bro Gama. Letter mailed to pt for reminder.     Neto LEONARDN, RN   Lung Nodule Navigator  [facility] [employer]  [phone]

## 2022-05-01 DIAGNOSIS — I10 ESSENTIAL HYPERTENSION: ICD-10-CM

## 2022-05-01 DIAGNOSIS — N18.4 CKD (CHRONIC KIDNEY DISEASE) STAGE 4, GFR 15-29 ML/MIN (HCC): ICD-10-CM

## 2022-05-02 RX ORDER — FUROSEMIDE 40 MG/1
TABLET ORAL
Qty: 30 TABLET | Refills: 3 | Status: SHIPPED | OUTPATIENT
Start: 2022-05-02

## 2022-05-17 ENCOUNTER — OFFICE VISIT (OUTPATIENT)
Dept: INTERNAL MEDICINE CLINIC | Age: 82
End: 2022-05-17
Payer: MEDICARE

## 2022-05-17 VITALS
SYSTOLIC BLOOD PRESSURE: 120 MMHG | DIASTOLIC BLOOD PRESSURE: 68 MMHG | OXYGEN SATURATION: 97 % | BODY MASS INDEX: 22.96 KG/M2 | HEIGHT: 61 IN | TEMPERATURE: 99.3 F | WEIGHT: 121.6 LBS | HEART RATE: 72 BPM

## 2022-05-17 DIAGNOSIS — N18.4 CKD (CHRONIC KIDNEY DISEASE) STAGE 4, GFR 15-29 ML/MIN (HCC): ICD-10-CM

## 2022-05-17 DIAGNOSIS — C66.1 MALIGNANT NEOPLASM OF RIGHT URETER (HCC): ICD-10-CM

## 2022-05-17 DIAGNOSIS — I10 ESSENTIAL HYPERTENSION: Primary | ICD-10-CM

## 2022-05-17 PROCEDURE — 4040F PNEUMOC VAC/ADMIN/RCVD: CPT | Performed by: HOSPITALIST

## 2022-05-17 PROCEDURE — 1090F PRES/ABSN URINE INCON ASSESS: CPT | Performed by: HOSPITALIST

## 2022-05-17 PROCEDURE — 4004F PT TOBACCO SCREEN RCVD TLK: CPT | Performed by: HOSPITALIST

## 2022-05-17 PROCEDURE — 99214 OFFICE O/P EST MOD 30 MIN: CPT | Performed by: HOSPITALIST

## 2022-05-17 PROCEDURE — G8399 PT W/DXA RESULTS DOCUMENT: HCPCS | Performed by: HOSPITALIST

## 2022-05-17 PROCEDURE — G8420 CALC BMI NORM PARAMETERS: HCPCS | Performed by: HOSPITALIST

## 2022-05-17 PROCEDURE — G8427 DOCREV CUR MEDS BY ELIG CLIN: HCPCS | Performed by: HOSPITALIST

## 2022-05-17 PROCEDURE — 1123F ACP DISCUSS/DSCN MKR DOCD: CPT | Performed by: HOSPITALIST

## 2022-05-17 SDOH — ECONOMIC STABILITY: FOOD INSECURITY: WITHIN THE PAST 12 MONTHS, THE FOOD YOU BOUGHT JUST DIDN'T LAST AND YOU DIDN'T HAVE MONEY TO GET MORE.: NEVER TRUE

## 2022-05-17 SDOH — ECONOMIC STABILITY: FOOD INSECURITY: WITHIN THE PAST 12 MONTHS, YOU WORRIED THAT YOUR FOOD WOULD RUN OUT BEFORE YOU GOT MONEY TO BUY MORE.: NEVER TRUE

## 2022-05-17 ASSESSMENT — PATIENT HEALTH QUESTIONNAIRE - PHQ9
SUM OF ALL RESPONSES TO PHQ QUESTIONS 1-9: 0
6. FEELING BAD ABOUT YOURSELF - OR THAT YOU ARE A FAILURE OR HAVE LET YOURSELF OR YOUR FAMILY DOWN: 0
7. TROUBLE CONCENTRATING ON THINGS, SUCH AS READING THE NEWSPAPER OR WATCHING TELEVISION: 0
2. FEELING DOWN, DEPRESSED OR HOPELESS: 0
SUM OF ALL RESPONSES TO PHQ QUESTIONS 1-9: 0
SUM OF ALL RESPONSES TO PHQ QUESTIONS 1-9: 0
8. MOVING OR SPEAKING SO SLOWLY THAT OTHER PEOPLE COULD HAVE NOTICED. OR THE OPPOSITE, BEING SO FIGETY OR RESTLESS THAT YOU HAVE BEEN MOVING AROUND A LOT MORE THAN USUAL: 0
3. TROUBLE FALLING OR STAYING ASLEEP: 0
1. LITTLE INTEREST OR PLEASURE IN DOING THINGS: 0
9. THOUGHTS THAT YOU WOULD BE BETTER OFF DEAD, OR OF HURTING YOURSELF: 0
SUM OF ALL RESPONSES TO PHQ9 QUESTIONS 1 & 2: 0
SUM OF ALL RESPONSES TO PHQ QUESTIONS 1-9: 0
4. FEELING TIRED OR HAVING LITTLE ENERGY: 0
5. POOR APPETITE OR OVEREATING: 0

## 2022-05-17 ASSESSMENT — ENCOUNTER SYMPTOMS
BLOOD IN STOOL: 0
DIARRHEA: 0
TROUBLE SWALLOWING: 0
SINUS PRESSURE: 0
SORE THROAT: 0
NAUSEA: 0
ABDOMINAL PAIN: 0
VOMITING: 0
SHORTNESS OF BREATH: 0
ABDOMINAL DISTENTION: 0
CONSTIPATION: 0
VOICE CHANGE: 0
COUGH: 0
WHEEZING: 0
BACK PAIN: 1
SINUS PAIN: 0
CHEST TIGHTNESS: 0

## 2022-05-17 ASSESSMENT — SOCIAL DETERMINANTS OF HEALTH (SDOH): HOW HARD IS IT FOR YOU TO PAY FOR THE VERY BASICS LIKE FOOD, HOUSING, MEDICAL CARE, AND HEATING?: NOT HARD AT ALL

## 2022-05-17 NOTE — PROGRESS NOTES
[facility] Internal Medicine  Follow-up visit   2022    Lady Hinds (:  1940) is a 80 y.o. female, here for follow-up:    Chief Complaint   Patient presents with    Hypertension    Hypotension        HPI  Insomnia:  The patient has had difficulty with sleeping. She did not do well with remeron. Using sleep-ez. -She sees a pain management doctor (Dr. Racheal Sher) and is receiving percocet for multilevel lumbar disease (lower back pain radiating into the left leg). -She sees Ericka Machuca for ureteral obstruction/urothelial carcinoma and had R stent exchange . -She has stage III-IV CKD and follows with Dr. Ida Alanis 2021.  10/15/2021 labs reviewed. ROS  Review of Systems   Constitutional: Positive for fatigue. Negative for activity change, appetite change, chills, diaphoresis, fever and unexpected weight change. HENT: Negative for sinus pressure, sinus pain, sore throat, trouble swallowing and voice change. Eyes: Negative for visual disturbance. Respiratory: Negative for cough, chest tightness, shortness of breath and wheezing. Cardiovascular: Negative for chest pain, palpitations and leg swelling. Gastrointestinal: Negative for abdominal distention, abdominal pain, blood in stool, constipation, diarrhea, nausea and vomiting. Endocrine: Negative for polydipsia and polyphagia. Genitourinary: Positive for difficulty urinating. Negative for decreased urine volume, dysuria and urgency. Musculoskeletal: Positive for back pain (Radiating to L leg. Sees pain management. ). Negative for gait problem, joint swelling and myalgias. Neurological: Negative for dizziness, seizures, syncope, light-headedness and headaches. Psychiatric/Behavioral: Positive for sleep disturbance (As above. ). Negative for agitation, behavioral problems, confusion and suicidal ideas. The patient is nervous/anxious.         HISTORIES  Current Outpatient Medications on File Prior to Visit Medication Sig Dispense Refill    furosemide (LASIX) 40 MG tablet TAKE 1/2 TABLET BY MOUTH DAILY 30 tablet 3    oxyCODONE-acetaminophen (PERCOCET) 7.5-325 MG per tablet Take 1 tablet by mouth every 8 hours as needed for Pain (chronic back pain. ). Chronic back pain. 90 tablet 0     No current facility-administered medications on file prior to visit. Allergies   Allergen Reactions    Penicillins Swelling     Swelling in vaginal area only  Vaginal swelling -- Pt cannot remember all symptoms;      Past Medical History:   Diagnosis Date    Anxiety     Cancer [facility])     RIGHT URETER    Colon polyps     check q5yrs    CVA (cerebral vascular accident) ([address] 10/12/2018    Lacunar, left hemisphere    HTN (hypertension) 10/2011    Hyperlipidemia     Kidney stones     Neurologic gait dysfunction     Proteinuria 10/2011    Screening mammogram 2010    benign    Ureteral lesion, native, right      Patient Active Problem List   Diagnosis    Anxiety and depression    Colon polyps    Tremor    Cigarette smoker    Hyperlipidemia    Essential hypertension    Thyroid nodule    Chronic bilateral low back pain without sciatica    Abnormal CT of the abdomen    Renal mass, right    Chronic right flank pain    Chronic, continuous use of opioids    Spinal stenosis of lumbar region with neurogenic claudication    Other osteoporosis without current pathological fracture    Other emphysema ([address]    CVA (cerebral vascular accident) ([address]    Sleep difficulties    Acute renal failure superimposed on stage 3a chronic kidney disease ([address]    Hydronephrosis    Urothelial carcinoma of right distal ureter ([address]    Fall at home, sequela    Acidosis    Hyperkalemia    Scalp laceration    Acute encephalopathy    Acute UTI    Hypotension due to hypovolemia    Falls frequently    CHAPO (acute kidney injury) ([address]    Acute kidney injury ([address]    Neck abscess    Chronic fatigue    CKD (chronic kidney disease) stage 4, GFR 15-29 ml/min [facility])     Past Surgical History:   Procedure Laterality Date    COLONOSCOPY  2010    Dr. Rosas De Los Santos - recheck 5 yrs    CYSTOSCOPY      CYSTOSCOPY Right 4/21/2021    CYSTOSCOPY, RIGHT URETEROSCOPY, WITH RETROGRADE, RIGHT URETERAL BIOPSY,  RIGHT STENT EXCHANGE performed by David Quiles MD at [place] [address] Right 7/20/2021    CYSTOSCOPY, RIGHT URETEROSCOPY WITH BIOPSY / LASER RESECTION OF TUMOR performed by David Quiles MD at [place] [address] Right 11/24/2021    CYSTOSCOPY, DIAGNOSTIC, RIGHT URETEROSCOPY, RIGHT STENT REMOVAL, RIGHT RETROGRADE PYELOGRAM performed by David Quiles MD at [facility] ERCP  5/8/2015    sphincter and stent    EYE SURGERY Bilateral     cataract removal    MOUTH SURGERY      WRIST FRACTURE SURGERY Left 12/23/2016    OPEN REDUCTION INTERNAL FIXATION LEFT DISTAL RADIUS FRACTURE     Social History     Socioeconomic History    Marital status:      Spouse name: Not on file    Number of children: 0    Years of education: Not on file    Highest education level: Not on file   Occupational History    Not on file   Tobacco Use    Smoking status: Current Every Day Smoker     Packs/day: 1.00     Years: 50.00     Pack years: 50.00     Types: Cigarettes     Start date: 5/1/1958    Smokeless tobacco: Never Used   Vaping Use    Vaping Use: Never used   Substance and Sexual Activity    Alcohol use: No     Alcohol/week: 0.0 standard drinks    Drug use: Never    Sexual activity: Never   Other Topics Concern    Not on file   Social History Narrative    Not on file     Social Determinants of Health     Financial Resource Strain: Low Risk     Difficulty of Paying Living Expenses: Not hard at all   Food Insecurity: No Food Insecurity    Worried About [address] in the Last Year: Never true    [address] in the Last Year: Never true   Transportation Needs:     Lack of Transportation (Medical):  Not on file    Lack of Transportation (Non-Medical): Not on file   Physical Activity:     Days of Exercise per Week: Not on file    Minutes of Exercise per Session: Not on file   Stress:     Feeling of Stress : Not on file   Social Connections:     Frequency of Communication with Friends and Family: Not on file    Frequency of Social Gatherings with Friends and Family: Not on file    Attends [employer] Services: Not on file    Active Member of [address] [place] [address] or Organizations: Not on file    Attends Club or Organization Meetings: Not on file    Marital Status: Not on file   Intimate Partner Violence:     Fear of Current or Ex-Partner: Not on file    Emotionally Abused: Not on file    Physically Abused: Not on file    Sexually Abused: Not on file   Housing Stability:     Unable to Pay for Housing in the Last Year: Not on file    Number of [place] in the Last Year: Not on file    Unstable Housing in the Last Year: Not on file      Family History   Problem Relation Age of Onset    Diabetes Mother     Heart Disease Father     Cancer Brother     Kidney Disease Brother        PE  Vitals:    05/17/22 1443   BP: 120/68   Site: Left Upper Arm   Position: Sitting   Pulse: 72   Temp: 99.3 °F (37.4 °C)   SpO2: 97%   Weight: 121 lb 9.6 oz (55.2 kg)   Height: 5' 1\" (1.549 m)     Estimated body mass index is 22.98 kg/m² as calculated from the following:    Height as of this encounter: 5' 1\" (1.549 m). Weight as of this encounter: 121 lb 9.6 oz (55.2 kg). Physical Exam  Vitals reviewed. Constitutional:       General: Distressed: aa. Appearance: Normal appearance. HENT:      Head: Normocephalic and atraumatic. Mouth/Throat:      Pharynx: Oropharynx is clear. Eyes:      Conjunctiva/sclera: Conjunctivae normal.      Pupils: Pupils are equal, round, and reactive to light. Cardiovascular:      Rate and Rhythm: Normal rate and regular rhythm. Pulses: Normal pulses. Heart sounds: Normal heart sounds.    Pulmonary: Effort: Pulmonary effort is normal. No respiratory distress. Breath sounds: Normal breath sounds. No wheezing or rales. Abdominal:      Palpations: Abdomen is soft. Tenderness: There is no abdominal tenderness. There is no rebound. Musculoskeletal:         General: No signs of injury. Normal range of motion. Cervical back: Normal range of motion and neck supple. Skin:     General: Skin is warm and dry. Coloration: Skin is not jaundiced. Neurological:      General: No focal deficit present. Mental Status: She is alert and oriented to person, place, and time. Psychiatric:         Behavior: Behavior normal.         Thought Content: Thought content normal.         Judgment: Judgment normal.         ASSESSMENT/ PLAN:  1. Essential hypertension  -Well controlled. 2. Malignant neoplasm of right ureter (HCC)  -F/U Dr. Sudhir Donato. 3. CKD (chronic kidney disease) stage 4, GFR 15-29 ml/min (HCC)  -F/U [facility]    4. Pulmonary Nodule:  -CT ordered. F/U pulmonology. No orders of the defined types were placed in this encounter. No orders of the defined types were placed in this encounter. Medications Discontinued During This Encounter   Medication Reason    hydrOXYzine (ATARAX) 50 MG tablet LIST CLEANUP    mirtazapine (REMERON) 15 MG tablet LIST CLEANUP        Return in about 6 months (around 11/17/2022). Ben Kent MD    This dictation was generated by voice recognition computer software. Although all attempts are made to edit the dictation for accuracy, there may be errors in the transcription that are not intended.

## 2022-05-20 RX ORDER — HYDROXYZINE 50 MG/1
TABLET, FILM COATED ORAL
Qty: 90 TABLET | Refills: 1 | Status: SHIPPED | OUTPATIENT
Start: 2022-05-20

## 2022-11-17 ENCOUNTER — OFFICE VISIT (OUTPATIENT)
Dept: INTERNAL MEDICINE CLINIC | Age: 82
End: 2022-11-17
Payer: MEDICARE

## 2022-11-17 VITALS
OXYGEN SATURATION: 98 % | DIASTOLIC BLOOD PRESSURE: 74 MMHG | HEIGHT: 61 IN | SYSTOLIC BLOOD PRESSURE: 112 MMHG | TEMPERATURE: 98.8 F | BODY MASS INDEX: 22.77 KG/M2 | HEART RATE: 80 BPM | WEIGHT: 120.6 LBS

## 2022-11-17 DIAGNOSIS — E78.49 OTHER HYPERLIPIDEMIA: ICD-10-CM

## 2022-11-17 DIAGNOSIS — N18.4 CKD (CHRONIC KIDNEY DISEASE) STAGE 4, GFR 15-29 ML/MIN (HCC): ICD-10-CM

## 2022-11-17 DIAGNOSIS — N18.4 CKD (CHRONIC KIDNEY DISEASE) STAGE 4, GFR 15-29 ML/MIN (HCC): Primary | ICD-10-CM

## 2022-11-17 LAB
A/G RATIO: 1.6 (ref 1.1–2.2)
ALBUMIN SERPL-MCNC: 4.2 G/DL (ref 3.4–5)
ALP BLD-CCNC: 67 U/L (ref 40–129)
ALT SERPL-CCNC: 7 U/L (ref 10–40)
ANION GAP SERPL CALCULATED.3IONS-SCNC: 16 MMOL/L (ref 3–16)
AST SERPL-CCNC: 12 U/L (ref 15–37)
BILIRUB SERPL-MCNC: <0.2 MG/DL (ref 0–1)
BUN BLDV-MCNC: 40 MG/DL (ref 7–20)
CALCIUM SERPL-MCNC: 9.2 MG/DL (ref 8.3–10.6)
CHLORIDE BLD-SCNC: 104 MMOL/L (ref 99–110)
CHOLESTEROL, FASTING: 206 MG/DL (ref 0–199)
CO2: 22 MMOL/L (ref 21–32)
CREAT SERPL-MCNC: 1.6 MG/DL (ref 0.6–1.2)
GFR SERPL CREATININE-BSD FRML MDRD: 32 ML/MIN/{1.73_M2}
GLUCOSE BLD-MCNC: 62 MG/DL (ref 70–99)
HCT VFR BLD CALC: 32.7 % (ref 36–48)
HDLC SERPL-MCNC: 68 MG/DL (ref 40–60)
HEMOGLOBIN: 10.6 G/DL (ref 12–16)
LDL CHOLESTEROL CALCULATED: 113 MG/DL
MCH RBC QN AUTO: 30.4 PG (ref 26–34)
MCHC RBC AUTO-ENTMCNC: 32.4 G/DL (ref 31–36)
MCV RBC AUTO: 93.7 FL (ref 80–100)
PDW BLD-RTO: 14.5 % (ref 12.4–15.4)
PLATELET # BLD: 268 K/UL (ref 135–450)
PMV BLD AUTO: 8.5 FL (ref 5–10.5)
POTASSIUM SERPL-SCNC: 4.8 MMOL/L (ref 3.5–5.1)
RBC # BLD: 3.48 M/UL (ref 4–5.2)
SODIUM BLD-SCNC: 142 MMOL/L (ref 136–145)
TOTAL PROTEIN: 6.9 G/DL (ref 6.4–8.2)
TRIGLYCERIDE, FASTING: 123 MG/DL (ref 0–150)
VLDLC SERPL CALC-MCNC: 25 MG/DL
WBC # BLD: 7.2 K/UL (ref 4–11)

## 2022-11-17 PROCEDURE — 99214 OFFICE O/P EST MOD 30 MIN: CPT | Performed by: HOSPITALIST

## 2022-11-17 PROCEDURE — 3078F DIAST BP <80 MM HG: CPT | Performed by: HOSPITALIST

## 2022-11-17 PROCEDURE — 4004F PT TOBACCO SCREEN RCVD TLK: CPT | Performed by: HOSPITALIST

## 2022-11-17 PROCEDURE — 1123F ACP DISCUSS/DSCN MKR DOCD: CPT | Performed by: HOSPITALIST

## 2022-11-17 PROCEDURE — G8484 FLU IMMUNIZE NO ADMIN: HCPCS | Performed by: HOSPITALIST

## 2022-11-17 PROCEDURE — G8399 PT W/DXA RESULTS DOCUMENT: HCPCS | Performed by: HOSPITALIST

## 2022-11-17 PROCEDURE — G8420 CALC BMI NORM PARAMETERS: HCPCS | Performed by: HOSPITALIST

## 2022-11-17 PROCEDURE — G8427 DOCREV CUR MEDS BY ELIG CLIN: HCPCS | Performed by: HOSPITALIST

## 2022-11-17 PROCEDURE — 1090F PRES/ABSN URINE INCON ASSESS: CPT | Performed by: HOSPITALIST

## 2022-11-17 PROCEDURE — 3074F SYST BP LT 130 MM HG: CPT | Performed by: HOSPITALIST

## 2022-11-17 RX ORDER — OXYCODONE AND ACETAMINOPHEN 7.5; 325 MG/1; MG/1
1 TABLET ORAL EVERY 4 HOURS PRN
COMMUNITY

## 2022-11-17 ASSESSMENT — ENCOUNTER SYMPTOMS
VOMITING: 0
BACK PAIN: 0
SHORTNESS OF BREATH: 0
ABDOMINAL PAIN: 0
CHEST TIGHTNESS: 0
SORE THROAT: 0
ABDOMINAL DISTENTION: 0
TROUBLE SWALLOWING: 0
DIARRHEA: 0
COUGH: 0
SINUS PAIN: 0
WHEEZING: 0
BLOOD IN STOOL: 0
NAUSEA: 0
SINUS PRESSURE: 0
VOICE CHANGE: 0
CONSTIPATION: 0

## 2022-11-17 NOTE — PROGRESS NOTES
[facility] Internal Medicine  Follow-up visit   2022    Filiberto Mark (:  1940) is a [de-identified] y.o. female, here for follow-up:    Chief Complaint   Patient presents with    Hypertension        HPI  Insomnia:  The patient has had difficulty with sleeping. She did not do well with remeron. Using sleep-ez. -She sees a pain management doctor (Dr. Zoraida Katz) and is receiving percocet for multilevel lumbar disease (lower back pain radiating into the left leg). -She sees Ja Farnsworth for ureteral obstruction/urothelial carcinoma and had R stent exchange . -She has stage III-IV CKD and follows with Dr. Roxane Pinon 2021.  10/15/2021 labs reviewed. New labs ordered. ROS  Review of Systems   Constitutional:  Negative for activity change, appetite change, chills, diaphoresis, fatigue, fever and unexpected weight change. HENT:  Negative for sinus pressure, sinus pain, sore throat, trouble swallowing and voice change. Eyes:  Negative for visual disturbance. Respiratory:  Negative for cough, chest tightness, shortness of breath and wheezing. Cardiovascular:  Negative for chest pain, palpitations and leg swelling. Gastrointestinal:  Negative for abdominal distention, abdominal pain, blood in stool, constipation, diarrhea, nausea and vomiting. Endocrine: Negative for polydipsia and polyphagia. Genitourinary:  Negative for decreased urine volume, difficulty urinating, dysuria and urgency. Musculoskeletal:  Negative for back pain, gait problem, joint swelling and myalgias. Neurological:  Negative for dizziness, seizures, syncope, light-headedness and headaches. Psychiatric/Behavioral:  Negative for agitation, behavioral problems, confusion and suicidal ideas. HISTORIES  Current Outpatient Medications on File Prior to Visit   Medication Sig Dispense Refill    oxyCODONE-acetaminophen (PERCOCET) 7.5-325 MG per tablet Take 1 tablet by mouth every 4 hours as needed for Pain. hydrOXYzine (ATARAX) 50 MG tablet TAKE ONE TABLET BY MOUTH THREE TIMES A DAY AS NEEDED FOR ANXIETY 90 tablet 1    furosemide (LASIX) 40 MG tablet TAKE 1/2 TABLET BY MOUTH DAILY 30 tablet 3     No current facility-administered medications on file prior to visit. Allergies   Allergen Reactions    Penicillins Swelling     Swelling in vaginal area only  Vaginal swelling -- Pt cannot remember all symptoms;      Past Medical History:   Diagnosis Date    Anxiety     Cancer [facility])     RIGHT URETER    Colon polyps     check q5yrs    CVA (cerebral vascular accident) ([address] 10/12/2018    Lacunar, left hemisphere    HTN (hypertension) 10/2011    Hyperlipidemia     Kidney stones     Neurologic gait dysfunction     Proteinuria 10/2011    Screening mammogram 2010    benign    Ureteral lesion, native, right      Patient Active Problem List   Diagnosis    Anxiety and depression    Colon polyps    Tremor    Cigarette smoker    Hyperlipidemia    Essential hypertension    Thyroid nodule    Chronic bilateral low back pain without sciatica    Abnormal CT of the abdomen    Renal mass, right    Chronic right flank pain    Chronic, continuous use of opioids    Spinal stenosis of lumbar region with neurogenic claudication    Other osteoporosis without current pathological fracture    Other emphysema (HCC)    CVA (cerebral vascular accident) ([address]    Sleep difficulties    Acute renal failure superimposed on stage 3a chronic kidney disease ([address]    Hydronephrosis    Urothelial carcinoma of right distal ureter ([address]    Fall at home, sequela    Acidosis    Hyperkalemia    Scalp laceration    Acute encephalopathy    Acute UTI    Hypotension due to hypovolemia    Falls frequently    CHAPO (acute kidney injury) ([address]    Acute kidney injury ([address]    Neck abscess    Chronic fatigue    CKD (chronic kidney disease) stage 4, GFR 15-29 ml/min [facility])     Past Surgical History:   Procedure Laterality Date    COLONOSCOPY  2010    Dr. Renetta Morales - recheck 5 yrs    CYSTOSCOPY      CYSTOSCOPY Right 4/21/2021    CYSTOSCOPY, RIGHT URETEROSCOPY, WITH RETROGRADE, RIGHT URETERAL BIOPSY,  RIGHT STENT EXCHANGE performed by Stephan Chisholm MD at [facility] Right 7/20/2021    CYSTOSCOPY, RIGHT URETEROSCOPY WITH BIOPSY / LASER RESECTION OF TUMOR performed by Stephan Chisholm MD at [facility] Right 11/24/2021    CYSTOSCOPY, DIAGNOSTIC, RIGHT URETEROSCOPY, RIGHT STENT REMOVAL, RIGHT RETROGRADE PYELOGRAM performed by Stephan Chisholm MD at [address]    ERCP  5/8/2015    sphincter and stent    EYE SURGERY Bilateral     cataract removal    MOUTH SURGERY      WRIST FRACTURE SURGERY Left 12/23/2016    OPEN REDUCTION INTERNAL FIXATION LEFT DISTAL RADIUS FRACTURE     Social History     Socioeconomic History    Marital status:       Spouse name: Not on file    Number of children: 0    Years of education: Not on file    Highest education level: Not on file   Occupational History    Not on file   Tobacco Use    Smoking status: Every Day     Packs/day: 1.00     Years: 50.00     Pack years: 50.00     Types: Cigarettes     Start date: 5/1/1958    Smokeless tobacco: Never   Vaping Use    Vaping Use: Never used   Substance and Sexual Activity    Alcohol use: No     Alcohol/week: 0.0 standard drinks    Drug use: Never    Sexual activity: Never   Other Topics Concern    Not on file   Social History Narrative    Not on file     Social Determinants of Health     Financial Resource Strain: Low Risk     Difficulty of Paying Living Expenses: Not hard at all   Food Insecurity: No Food Insecurity    Worried About Running Out of Food in the Last Year: Never true    Ran Out of Food in the Last Year: Never true   Transportation Needs: Not on file   Physical Activity: Not on file   Stress: Not on file   Social Connections: Not on file   Intimate Partner Violence: Not on file   Housing Stability: Not on file      Family History   Problem Relation Age of Onset    Diabetes Mother Heart Disease Father     Cancer Brother     Kidney Disease Brother        PE  Vitals:    11/17/22 1242   BP: 112/74   Site: Right Upper Arm   Position: Sitting   Pulse: 80   Temp: 98.8 °F (37.1 °C)   SpO2: 98%   Weight: 120 lb 9.6 oz (54.7 kg)   Height: 5' 1\" (1.549 m)     Estimated body mass index is 22.79 kg/m² as calculated from the following:    Height as of this encounter: 5' 1\" (1.549 m). Weight as of this encounter: 120 lb 9.6 oz (54.7 kg). Physical Exam  Vitals reviewed. Constitutional:       General: She is not in acute distress. Appearance: Normal appearance. HENT:      Head: Normocephalic and atraumatic. Mouth/Throat:      Pharynx: Oropharynx is clear. Eyes:      Conjunctiva/sclera: Conjunctivae normal.      Pupils: Pupils are equal, round, and reactive to light. Cardiovascular:      Rate and Rhythm: Normal rate and regular rhythm. Pulses: Normal pulses. Heart sounds: Normal heart sounds. Pulmonary:      Effort: Pulmonary effort is normal. No respiratory distress. Breath sounds: Normal breath sounds. No wheezing or rales. Abdominal:      Palpations: Abdomen is soft. Tenderness: There is no abdominal tenderness. There is no rebound. Musculoskeletal:         General: No signs of injury. Normal range of motion. Cervical back: Normal range of motion and neck supple. Skin:     General: Skin is warm and dry. Coloration: Skin is not jaundiced. Neurological:      General: No focal deficit present. Mental Status: She is alert and oriented to person, place, and time. Psychiatric:         Behavior: Behavior normal.         Thought Content: Thought content normal.         Judgment: Judgment normal.       ASSESSMENT/ PLAN:  1. CKD (chronic kidney disease) stage 4, GFR 15-29 ml/min ([facility])  - Will F/U Dr. Lori Gallegos. BP well controlled. On lasix. - Comprehensive Metabolic Panel; Future  - CBC; Future    2.  Other hyperlipidemia  -  No Rx.    - Comprehensive Metabolic Panel; Future  - Lipid, Fasting; Future     Orders Placed This Encounter   Procedures    Comprehensive Metabolic Panel    CBC    Lipid, Fasting        Orders Placed This Encounter   Medications    diclofenac sodium (VOLTAREN) 1 % GEL     Sig: Apply 2 g topically 4 times daily     Dispense:  150 g     Refill:  3        There are no discontinued medications. Return in about 3 months (around 2/17/2023) for AWV. Miki Alex MD    This dictation was generated by voice recognition computer software. Although all attempts are made to edit the dictation for accuracy, there may be errors in the transcription that are not intended.

## 2023-06-07 ENCOUNTER — TELEPHONE (OUTPATIENT)
Dept: INTERNAL MEDICINE CLINIC | Age: 83
End: 2023-06-07

## 2023-06-07 NOTE — TELEPHONE ENCOUNTER
According to Marian Quinteros, Mrs. Ashwini Adams was supposed to call her pain doctor for this medication. Mrs. Jose Roberto Gomez would like to know if you can fill this prescription for Mrs. Stephens. Please advise.

## 2023-06-07 NOTE — TELEPHONE ENCOUNTER
Patient is requesting a refill on her percocet but the [facility] pharmacy she normally uses is  completely out at this time. Patient would like the prescription sent to a pharmacy that has the medication. Patient would like a call back when medication is sent to knew pharmacy. Patient states she will go to any pharmacy that has the medication today.     Please advise

## 2025-01-13 NOTE — PROGRESS NOTES
Have Your Skin Lesions Been Treated?: not been treated [facility]  [address], [place], [address]  [phone] (S)  [phone] (f)    11/3/17        Cheri Huffman  1940      Stephanie Blackwell MD      Chief Complain:   Chief Complaint   Patient presents with    Lower Back Pain     f/u on lumbar pain that radiates down to the Lt leg to the calf        History of Present Illness   HPI    Seen us since 09/2017 -  Chronic episodic low back pain since 1970; progressive for the past 2 years. Pain flared after hospital admission for a fall 7 months ago; no weakness acute changes in bladder or bowel. Pain is achy, throbbing, sharp with radiation to the legs (L>R); no weakness. Pain worse in the morning, lifting, bending, twisting, changing position and helped by pain medication. Tried PT, medications and spinal injections in the past without help. RED FLAG symptoms (Symptoms may include, but not limited to, acute trauma, fever, drug use, malignancy, weakness, perianal numbness, bladder/bowel changes) since last visit - No    Changes since last visit:   Recently completed MRI - here for further options      Past Medical History:   Diagnosis Date    Anxiety     Colon polyps     check q5yrs    HTN (hypertension) 10/11    Hyperlipidemia     Proteinuria 10/11    Screening mammogram 2010    benign       Past Surgical History:   Procedure Laterality Date    COLONOSCOPY  2010    Dr. Maude Giordano - recheck 5 yrs    ERCP  5/8/2015    sphincter and stent    WRIST FRACTURE SURGERY Left 12/23/2016    OPEN REDUCTION INTERNAL FIXATION LEFT DISTAL RADIUS FRACTURE       Allergies   Allergen Reactions    Penicillins Swelling     Swelling in vaginal area only  Vaginal swelling       Current Outpatient Prescriptions   Medication Sig Dispense Refill    oxyCODONE-acetaminophen (PERCOCET)  MG per tablet Take 1 tablet by mouth 3 times daily as needed (for severe pain) .  Earliest Fill Date: 10/23/17 90 tablet 0    ALPRAZolam Is This A New Presentation, Or A Follow-Up?: Skin Lesions How Severe Is Your Skin Lesion?: mild pain. Negative for falls, joint pain, myalgias and neck pain. Skin: Negative for itching and rash. Neurological: Positive for tingling. Negative for dizziness, sensory change (in legs), focal weakness, seizures, weakness and headaches. Endo/Heme/Allergies: Does not bruise/bleed easily. Psychiatric/Behavioral: Negative for depression, substance abuse and suicidal ideas. The patient is not nervous/anxious and does not have insomnia. Physical Exam:   Physical Exam   Constitutional: She is oriented to person, place, and time. No distress. In mild distress  Ambulates with cane   HENT:   Head: Normocephalic. Eyes: EOM are normal. Pupils are equal, round, and reactive to light. Neck: Normal range of motion. Neck supple. No thyromegaly present. Cardiovascular: Normal rate, regular rhythm, normal heart sounds and intact distal pulses. Pulmonary/Chest: Effort normal and breath sounds normal. No respiratory distress. She exhibits no tenderness. Abdominal: Soft. Bowel sounds are normal. She exhibits no mass. There is no tenderness. There is no guarding. Musculoskeletal: Normal range of motion. She exhibits no tenderness or deformity. Mild lumbar paraspinal tenderness present   Lymphadenopathy:     She has no cervical adenopathy. Neurological: She is alert and oriented to person, place, and time. She has normal strength and normal reflexes. A sensory deficit (diminished in left leg) is present. No cranial nerve deficit. She exhibits normal muscle tone. Gait (compensated) abnormal. Coordination normal. She displays no Babinski's sign on the right side. She displays no Babinski's sign on the left side. Reflex Scores:       Tricep reflexes are 2+ on the right side and 2+ on the left side. Bicep reflexes are 2+ on the right side and 2+ on the left side. Brachioradialis reflexes are 2+ on the right side and 2+ on the left side.        Patellar reflexes are 2+ on the right side and 2+ on the left side. Achilles reflexes are 2+ on the right side and 2+ on the left side. Skin: Skin is warm. No rash noted. She is not diaphoretic. Psychiatric: She has a normal mood and affect. Her behavior is normal. Thought content normal.       Vitals:    11/03/17 1245   BP: (!) 175/84   Site: Left Arm   Position: Sitting   Cuff Size: Medium Adult   Pulse: 80   Weight: 137 lb (62.1 kg)   Height: 5' 5\" (1.651 m)       Body mass index is 22.8 kg/m². Pain Score:   8 /10      Medication Effects: Analgesia:     Average level of pain for the past month = 8/10    Percentage of pain relief = 40%    Activities Improved:    Physical functioning = 30%    Family relationships = 30%    Social relationships = 30%    Mood = 20%    Sleep = 20%    Overall functioning = 20%    Adverse Effects: Any adverse effects: Yes    Type/Severity of side effect: poor appetite   Aberrant Behavior:    Requests for frequent early refills: No    Increased dose without authorization: No    Reports lost or stolen prescriptions: No    Attempts to obtain prescriptions from other providers: No    Use of pain medication in response to situational stressor: No     Increasingly unkempt or impaired: No    Negative mood changes: No    Abusing alcohol or illicit drugs: No    Appears intoxicated: No    Other: NA    Benefit from Other Treatments (since last visit):      Physical Therapy: Yes / N/A   Counseling: No / N/A   Injections: No / N/A      Compliance Monitoring:      ORT Score =  2   Current MEDD = 45   OARRS:    Checked today: Yes    Adverse report: No   UDS:    Date of last UDS: 06/21/2017    Results appropriate: Yes      Assessment:    1. Spinal stenosis of lumbar region with neurogenic claudication  Chronic  - External Referral To Neurosurgery    2. Spondylolisthesis, lumbar region  Chronic  - External Referral To Neurosurgery    3. Chronic, continuous use of opioids  ORT score = Low; risk factors - depression, advanced age.     Plan: 1. Chronic low back pain with tingling in legs; no focal changes. 2. Reviewed MRI results and films with patient in detail - degenerative spondylolisthesis at L4-L5 and L5-S1 levels, moderate sponylotic changes with moderate central canal and neural foraminal narrowing at these levels. 3. Encouraged to continue home exercises; she has failed PT, medications and spinal injections through another provider. 4. Continues NSAIDs and percocet 10 mg TID PRN with moderate help; no aberrancy. 5. Discussed spinal stenosis and management options in detail. May try caudal CASSIE vs. Surgical consultation. 6. She wishes to consider surgical consultation - referral placed. May call us back after the consultation for further options. RTC x 2 months. Failed conservative care:    - Failed conservative care with PT and medications. - Based on an analysis of risks, benefits, and alternatives, prescription of opioid appears appropriate for management of his chronic      Analgesic Plan:   Continue present regimen: Yes   Adjust dose of present analgesic: No   Switch analgesics: No   Add/Adjust concomitant therapy: No     - Encouraged regular home exercises and back strengthening as long-term goals. - Counseled on role and limitations of medications and injections. - Counseled on dual effects, limitations, side effects and long-term side effects of opioids including but not limited to opioid induced hormonal suppression, hyperalgesia, altered immune system, and elevated cardiac risk, dependence and addiction. Discussed proper use and potential life threatening side effects of inappropriate use. - Educational material provided on multidisciplinary chronic pain management, safe opioid use. Controlled Substances Monitoring: Attestation: The Prescription Monitoring Report for this patient was reviewed today.  Santiago Aldana MD)  Documentation: Possible medication side effects, risk of tolerance and/or dependence, and alternative treatments discussed., Obtaining appropriate analgesic effect of treatment., No signs of potential drug abuse or diversion identified., Existing medication contract. Govind Damon MD)    The entire treatment plan was discussed with patient and agreed.        Magdiel Martinez MD  Board Certified in Anesthesiology and Pain Medicine

## 2025-02-25 ENCOUNTER — TELEPHONE (OUTPATIENT)
Dept: INTERNAL MEDICINE CLINIC | Age: 85
End: 2025-02-25

## 2025-02-25 NOTE — TELEPHONE ENCOUNTER
Patient is needing home health care ordered.    She is scheduled to see you in March however she is declining and in need of assistance.    Please let her know if you could see her sooner than her scheduled appointment.    Can use either number listed for patient or for Gisel.    Gisel Gooden [phone].

## 2025-02-25 NOTE — TELEPHONE ENCOUNTER
Okay to place home health orders.  Can we get more information about how specifically she is declining, and what symptoms we hope to address with an earlier visit.

## 2025-04-07 ENCOUNTER — OFFICE VISIT (OUTPATIENT)
Dept: INTERNAL MEDICINE CLINIC | Age: 85
End: 2025-04-07
Payer: MEDICARE

## 2025-04-07 VITALS
TEMPERATURE: 98.6 F | HEIGHT: 61 IN | SYSTOLIC BLOOD PRESSURE: 106 MMHG | HEART RATE: 63 BPM | BODY MASS INDEX: 20.39 KG/M2 | WEIGHT: 108 LBS | DIASTOLIC BLOOD PRESSURE: 64 MMHG | OXYGEN SATURATION: 98 %

## 2025-04-07 DIAGNOSIS — Z00.00 PREVENTATIVE HEALTH CARE: ICD-10-CM

## 2025-04-07 DIAGNOSIS — E04.1 THYROID NODULE: ICD-10-CM

## 2025-04-07 DIAGNOSIS — Z00.00 MEDICARE ANNUAL WELLNESS VISIT, SUBSEQUENT: Primary | ICD-10-CM

## 2025-04-07 DIAGNOSIS — N18.4 CKD (CHRONIC KIDNEY DISEASE) STAGE 4, GFR 15-29 ML/MIN (HCC): ICD-10-CM

## 2025-04-07 DIAGNOSIS — J43.8 OTHER EMPHYSEMA (HCC): ICD-10-CM

## 2025-04-07 DIAGNOSIS — I10 ESSENTIAL HYPERTENSION: ICD-10-CM

## 2025-04-07 DIAGNOSIS — C66.1 MALIGNANT NEOPLASM OF RIGHT URETER: ICD-10-CM

## 2025-04-07 PROCEDURE — 3023F SPIROM DOC REV: CPT | Performed by: HOSPITALIST

## 2025-04-07 PROCEDURE — 1123F ACP DISCUSS/DSCN MKR DOCD: CPT | Performed by: HOSPITALIST

## 2025-04-07 PROCEDURE — 99214 OFFICE O/P EST MOD 30 MIN: CPT | Performed by: HOSPITALIST

## 2025-04-07 PROCEDURE — 1090F PRES/ABSN URINE INCON ASSESS: CPT | Performed by: HOSPITALIST

## 2025-04-07 PROCEDURE — G8427 DOCREV CUR MEDS BY ELIG CLIN: HCPCS | Performed by: HOSPITALIST

## 2025-04-07 PROCEDURE — 4004F PT TOBACCO SCREEN RCVD TLK: CPT | Performed by: HOSPITALIST

## 2025-04-07 PROCEDURE — G8399 PT W/DXA RESULTS DOCUMENT: HCPCS | Performed by: HOSPITALIST

## 2025-04-07 PROCEDURE — 3078F DIAST BP <80 MM HG: CPT | Performed by: HOSPITALIST

## 2025-04-07 PROCEDURE — 3074F SYST BP LT 130 MM HG: CPT | Performed by: HOSPITALIST

## 2025-04-07 PROCEDURE — 1160F RVW MEDS BY RX/DR IN RCRD: CPT | Performed by: HOSPITALIST

## 2025-04-07 PROCEDURE — G2211 COMPLEX E/M VISIT ADD ON: HCPCS | Performed by: HOSPITALIST

## 2025-04-07 PROCEDURE — G8420 CALC BMI NORM PARAMETERS: HCPCS | Performed by: HOSPITALIST

## 2025-04-07 PROCEDURE — 1159F MED LIST DOCD IN RCRD: CPT | Performed by: HOSPITALIST

## 2025-04-07 PROCEDURE — G0439 PPPS, SUBSEQ VISIT: HCPCS | Performed by: HOSPITALIST

## 2025-04-07 SDOH — ECONOMIC STABILITY: FOOD INSECURITY: WITHIN THE PAST 12 MONTHS, THE FOOD YOU BOUGHT JUST DIDN'T LAST AND YOU DIDN'T HAVE MONEY TO GET MORE.: NEVER TRUE

## 2025-04-07 SDOH — ECONOMIC STABILITY: FOOD INSECURITY: WITHIN THE PAST 12 MONTHS, YOU WORRIED THAT YOUR FOOD WOULD RUN OUT BEFORE YOU GOT MONEY TO BUY MORE.: NEVER TRUE

## 2025-04-07 ASSESSMENT — PATIENT HEALTH QUESTIONNAIRE - PHQ9
SUM OF ALL RESPONSES TO PHQ QUESTIONS 1-9: 0
SUM OF ALL RESPONSES TO PHQ QUESTIONS 1-9: 0
3. TROUBLE FALLING OR STAYING ASLEEP: NOT AT ALL
6. FEELING BAD ABOUT YOURSELF - OR THAT YOU ARE A FAILURE OR HAVE LET YOURSELF OR YOUR FAMILY DOWN: NOT AT ALL
9. THOUGHTS THAT YOU WOULD BE BETTER OFF DEAD, OR OF HURTING YOURSELF: NOT AT ALL
2. FEELING DOWN, DEPRESSED OR HOPELESS: NOT AT ALL
1. LITTLE INTEREST OR PLEASURE IN DOING THINGS: NOT AT ALL
7. TROUBLE CONCENTRATING ON THINGS, SUCH AS READING THE NEWSPAPER OR WATCHING TELEVISION: NOT AT ALL
SUM OF ALL RESPONSES TO PHQ QUESTIONS 1-9: 0
8. MOVING OR SPEAKING SO SLOWLY THAT OTHER PEOPLE COULD HAVE NOTICED. OR THE OPPOSITE, BEING SO FIGETY OR RESTLESS THAT YOU HAVE BEEN MOVING AROUND A LOT MORE THAN USUAL: NOT AT ALL
10. IF YOU CHECKED OFF ANY PROBLEMS, HOW DIFFICULT HAVE THESE PROBLEMS MADE IT FOR YOU TO DO YOUR WORK, TAKE CARE OF THINGS AT HOME, OR GET ALONG WITH OTHER PEOPLE: NOT DIFFICULT AT ALL
SUM OF ALL RESPONSES TO PHQ QUESTIONS 1-9: 0
4. FEELING TIRED OR HAVING LITTLE ENERGY: NOT AT ALL
5. POOR APPETITE OR OVEREATING: NOT AT ALL

## 2025-04-07 ASSESSMENT — LIFESTYLE VARIABLES
HOW OFTEN DURING THE LAST YEAR HAVE YOU FOUND THAT YOU WERE NOT ABLE TO STOP DRINKING ONCE YOU HAD STARTED: NEVER
HAS A RELATIVE, FRIEND, DOCTOR, OR ANOTHER HEALTH PROFESSIONAL EXPRESSED CONCERN ABOUT YOUR DRINKING OR SUGGESTED YOU CUT DOWN: NO
HOW MANY STANDARD DRINKS CONTAINING ALCOHOL DO YOU HAVE ON A TYPICAL DAY: 3 OR 4
HOW OFTEN DURING THE LAST YEAR HAVE YOU NEEDED AN ALCOHOLIC DRINK FIRST THING IN THE MORNING TO GET YOURSELF GOING AFTER A NIGHT OF HEAVY DRINKING: NEVER
HAVE YOU OR SOMEONE ELSE BEEN INJURED AS A RESULT OF YOUR DRINKING: NO
HOW OFTEN DURING THE LAST YEAR HAVE YOU BEEN UNABLE TO REMEMBER WHAT HAPPENED THE NIGHT BEFORE BECAUSE YOU HAD BEEN DRINKING: NEVER
HOW OFTEN DO YOU HAVE A DRINK CONTAINING ALCOHOL: 4 OR MORE TIMES A WEEK
HOW OFTEN DURING THE LAST YEAR HAVE YOU HAD A FEELING OF GUILT OR REMORSE AFTER DRINKING: NEVER
HOW OFTEN DURING THE LAST YEAR HAVE YOU FAILED TO DO WHAT WAS NORMALLY EXPECTED FROM YOU BECAUSE OF DRINKING: NEVER

## 2025-04-07 NOTE — PROGRESS NOTES
Medicare Annual Wellness Visit    Silvina Stephens is here for Medicare AWV and Other (Discuss home health care services )    Assessment & Plan   Medicare annual wellness visit, subsequent  Essential hypertension  -     CBC; Future  -     Comprehensive Metabolic Panel; Future  CKD (chronic kidney disease) stage 4, GFR 15-29 ml/min (HCC)  -     CBC; Future  -     Comprehensive Metabolic Panel; Future  -     Lipid, Fasting; Future  Malignant neoplasm of right ureter  Other emphysema (HCC)  Thyroid nodule  -     TSH reflex to FT4; Future  Preventative health care  -     Hemoglobin A1C; Future       Return in about 1 year (around 4/7/2026) for AWV.     Subjective   The following acute and/or chronic problems were also addressed today:    Insomnia:  The patient has had difficulty with sleeping. She did not do well with remeron.  Using sleep-ez.       The patient's friend/care giver who sees her three to four times weekly and helps with housekeeping, driving.  She gave collateral history.       -She sees a pain management doctor ([facility].  Dr. Langley) and is receiving percocet for multilevel lumbar disease (lower back pain radiating into the left leg).  -She sees Kanu Kaur for ureteral obstruction/urothelial carcinoma   -She has stage III-IV CKD and follows with Dr. Schroeder.    Patient's complete Health Risk Assessment and screening values have been reviewed and are found in Flowsheets. The following problems were reviewed today and where indicated follow up appointments were made and/or referrals ordered.    Positive Risk Factor Screenings with Interventions:     Cognitive:   Clock Drawing Test (CDT): (!) Abnormal  Words recalled: 2 Words Recalled  Total Score: (!) 2  Total Score Interpretation: Abnormal Mini-Cog  Interventions:  Patient declines any further evaluation or treatment     Alcohol Screening:  AUDIT-C Score: 8  AUDIT Total Score: 8  Total Score Interpretation: 8-12 suggests

## 2025-04-08 ENCOUNTER — RESULTS FOLLOW-UP (OUTPATIENT)
Dept: INTERNAL MEDICINE CLINIC | Age: 85
End: 2025-04-08

## 2025-04-08 LAB
ALBUMIN SERPL-MCNC: 4.2 G/DL (ref 3.4–5)
ALBUMIN/GLOB SERPL: 1.7 {RATIO} (ref 1.1–2.2)
ALP SERPL-CCNC: 53 U/L (ref 40–129)
ALT SERPL-CCNC: 11 U/L (ref 10–40)
ANION GAP SERPL CALCULATED.3IONS-SCNC: 9 MMOL/L (ref 3–16)
AST SERPL-CCNC: 20 U/L (ref 15–37)
BILIRUB SERPL-MCNC: 0.3 MG/DL (ref 0–1)
BUN SERPL-MCNC: 36 MG/DL (ref 7–20)
CALCIUM SERPL-MCNC: 9.4 MG/DL (ref 8.3–10.6)
CHLORIDE SERPL-SCNC: 106 MMOL/L (ref 99–110)
CHOLEST SERPL-MCNC: 214 MG/DL (ref 0–199)
CO2 SERPL-SCNC: 23 MMOL/L (ref 21–32)
CREAT SERPL-MCNC: 1.6 MG/DL (ref 0.6–1.2)
DEPRECATED RDW RBC AUTO: 14.6 % (ref 12.4–15.4)
EST. AVERAGE GLUCOSE BLD GHB EST-MCNC: 96.8 MG/DL
GFR SERPLBLD CREATININE-BSD FMLA CKD-EPI: 32 ML/MIN/{1.73_M2}
GLUCOSE SERPL-MCNC: 85 MG/DL (ref 70–99)
HBA1C MFR BLD: 5 %
HCT VFR BLD AUTO: 36.6 % (ref 36–48)
HDLC SERPL-MCNC: 84 MG/DL (ref 40–60)
HGB BLD-MCNC: 12.2 G/DL (ref 12–16)
LDL CHOLESTEROL: 106 MG/DL
MCH RBC QN AUTO: 31.4 PG (ref 26–34)
MCHC RBC AUTO-ENTMCNC: 33.3 G/DL (ref 31–36)
MCV RBC AUTO: 94.3 FL (ref 80–100)
PLATELET # BLD AUTO: 223 K/UL (ref 135–450)
PMV BLD AUTO: 9 FL (ref 5–10.5)
POTASSIUM SERPL-SCNC: 5.4 MMOL/L (ref 3.5–5.1)
PROT SERPL-MCNC: 6.7 G/DL (ref 6.4–8.2)
RBC # BLD AUTO: 3.88 M/UL (ref 4–5.2)
SODIUM SERPL-SCNC: 138 MMOL/L (ref 136–145)
TRIGL SERPL-MCNC: 120 MG/DL (ref 0–150)
TSH SERPL DL<=0.005 MIU/L-ACNC: 0.73 UIU/ML (ref 0.27–4.2)
VLDLC SERPL CALC-MCNC: 24 MG/DL
WBC # BLD AUTO: 5.1 K/UL (ref 4–11)

## 2025-04-29 ENCOUNTER — TELEPHONE (OUTPATIENT)
Dept: INTERNAL MEDICINE CLINIC | Age: 85
End: 2025-04-29

## 2025-04-29 NOTE — TELEPHONE ENCOUNTER
[facility] for the PT that need signed by PCP and faxed back.  Please call and advise  [phone]   DC instructions

## 2025-05-01 NOTE — TELEPHONE ENCOUNTER
Spoke with pt. She did not request any back or knee braces. She states they keep calling her at home and bothering her. This is a scam.

## 2025-06-24 ENCOUNTER — TELEPHONE (OUTPATIENT)
Dept: INTERNAL MEDICINE CLINIC | Age: 85
End: 2025-06-24

## 2025-06-24 DIAGNOSIS — F17.200 SMOKING ADDICTION: Primary | ICD-10-CM

## 2025-06-24 RX ORDER — NICOTINE 21 MG/24HR
1 PATCH, TRANSDERMAL 24 HOURS TRANSDERMAL DAILY
Qty: 42 PATCH | Refills: 0 | Status: SHIPPED | OUTPATIENT
Start: 2025-06-24 | End: 2025-08-05

## 2025-06-24 NOTE — TELEPHONE ENCOUNTER
I ordered patches to [facility] on [address].  However, I am fairly certain that Medicare will not cover this.  She may have to purchase over-the-counter.

## 2025-06-24 NOTE — TELEPHONE ENCOUNTER
Pt states that she is moving into assistant living and has to stop smoking and is asking for a prescription for nicotine patches 21 mg to start with sent to pharmacy    [phone]  Pls call and advise    [facility] [id number] - [facility] [address] - P [phone] - F [phone]

## 2025-07-16 ENCOUNTER — OFFICE VISIT (OUTPATIENT)
Dept: INTERNAL MEDICINE CLINIC | Age: 85
End: 2025-07-16
Payer: MEDICARE

## 2025-07-16 VITALS
BODY MASS INDEX: 18.89 KG/M2 | HEART RATE: 95 BPM | WEIGHT: 100 LBS | DIASTOLIC BLOOD PRESSURE: 74 MMHG | TEMPERATURE: 98.6 F | SYSTOLIC BLOOD PRESSURE: 124 MMHG | OXYGEN SATURATION: 98 %

## 2025-07-16 DIAGNOSIS — G89.29 CHRONIC BILATERAL LOW BACK PAIN WITHOUT SCIATICA: ICD-10-CM

## 2025-07-16 DIAGNOSIS — M54.50 CHRONIC BILATERAL LOW BACK PAIN WITHOUT SCIATICA: ICD-10-CM

## 2025-07-16 DIAGNOSIS — Z86.73 HISTORY OF STROKE: ICD-10-CM

## 2025-07-16 DIAGNOSIS — I63.9 CEREBROVASCULAR ACCIDENT (CVA), UNSPECIFIED MECHANISM (HCC): Primary | ICD-10-CM

## 2025-07-16 PROCEDURE — G8399 PT W/DXA RESULTS DOCUMENT: HCPCS | Performed by: HOSPITALIST

## 2025-07-16 PROCEDURE — G8427 DOCREV CUR MEDS BY ELIG CLIN: HCPCS | Performed by: HOSPITALIST

## 2025-07-16 PROCEDURE — G2211 COMPLEX E/M VISIT ADD ON: HCPCS | Performed by: HOSPITALIST

## 2025-07-16 PROCEDURE — 1159F MED LIST DOCD IN RCRD: CPT | Performed by: HOSPITALIST

## 2025-07-16 PROCEDURE — 4004F PT TOBACCO SCREEN RCVD TLK: CPT | Performed by: HOSPITALIST

## 2025-07-16 PROCEDURE — 1090F PRES/ABSN URINE INCON ASSESS: CPT | Performed by: HOSPITALIST

## 2025-07-16 PROCEDURE — G8420 CALC BMI NORM PARAMETERS: HCPCS | Performed by: HOSPITALIST

## 2025-07-16 PROCEDURE — 3078F DIAST BP <80 MM HG: CPT | Performed by: HOSPITALIST

## 2025-07-16 PROCEDURE — 1123F ACP DISCUSS/DSCN MKR DOCD: CPT | Performed by: HOSPITALIST

## 2025-07-16 PROCEDURE — 3074F SYST BP LT 130 MM HG: CPT | Performed by: HOSPITALIST

## 2025-07-16 PROCEDURE — 99214 OFFICE O/P EST MOD 30 MIN: CPT | Performed by: HOSPITALIST

## 2025-07-16 ASSESSMENT — ENCOUNTER SYMPTOMS
SINUS PAIN: 0
TROUBLE SWALLOWING: 0
SORE THROAT: 0
CHEST TIGHTNESS: 0
VOMITING: 0
COUGH: 0
NAUSEA: 0
VOICE CHANGE: 0
CONSTIPATION: 0
DIARRHEA: 0
BLOOD IN STOOL: 0
SINUS PRESSURE: 0
ABDOMINAL DISTENTION: 0
ABDOMINAL PAIN: 0
WHEEZING: 0
SHORTNESS OF BREATH: 0
BACK PAIN: 0

## 2025-07-16 NOTE — PROGRESS NOTES
There is no rebound.   Musculoskeletal:         General: No signs of injury. Normal range of motion.      Cervical back: Normal range of motion and neck supple.   Skin:     General: Skin is warm and dry.      Coloration: Skin is not jaundiced.   Neurological:      General: No focal deficit present.      Mental Status: She is alert and oriented to person, place, and time.   Psychiatric:         Behavior: Behavior normal.         Thought Content: Thought content normal.         Judgment: Judgment normal.         ASSESSMENT/ PLAN:  1. Cerebrovascular accident (CVA), unspecified mechanism (HCC)  - Non [facility] - External Referral To Home Health    2. History of stroke  - Non [facility] - External Referral To Home Health    3. Chronic bilateral low back pain without sciatica  - Non [facility] - External Referral To Home Health       Orders Placed This Encounter   Procedures    Non [facility] - External Referral To Home Health      No orders of the defined types were placed in this encounter.     There are no discontinued medications.     No follow-ups on file.    ELSI YIP MD    This dictation was generated by voice recognition computer software.  Although all attempts are made to edit the dictation for accuracy, there may be errors in the transcription that are not intended.

## 2025-07-17 ENCOUNTER — TELEPHONE (OUTPATIENT)
Dept: INTERNAL MEDICINE CLINIC | Age: 85
End: 2025-07-17

## 2025-07-17 DIAGNOSIS — F17.200 SMOKING ADDICTION: ICD-10-CM

## 2025-07-17 RX ORDER — NICOTINE 21 MG/24HR
1 PATCH, TRANSDERMAL 24 HOURS TRANSDERMAL DAILY
Qty: 42 PATCH | Refills: 0 | Status: SHIPPED | OUTPATIENT
Start: 2025-07-17 | End: 2025-07-17 | Stop reason: SDUPTHER

## 2025-07-17 RX ORDER — NICOTINE 21 MG/24HR
1 PATCH, TRANSDERMAL 24 HOURS TRANSDERMAL DAILY
Qty: 42 PATCH | Refills: 0 | Status: CANCELLED | OUTPATIENT
Start: 2025-07-17 | End: 2025-08-28

## 2025-07-17 RX ORDER — NICOTINE 21 MG/24HR
1 PATCH, TRANSDERMAL 24 HOURS TRANSDERMAL DAILY
Qty: 42 PATCH | Refills: 0 | Status: SHIPPED | OUTPATIENT
Start: 2025-07-17 | End: 2025-08-28

## 2025-07-17 NOTE — TELEPHONE ENCOUNTER
[place] from [facility] is calling to check on the pt's paperwork she needs to be able to move in on 7/25  Resent paperwork today.   Please advise

## 2025-07-18 ENCOUNTER — TELEPHONE (OUTPATIENT)
Dept: INTERNAL MEDICINE CLINIC | Age: 85
End: 2025-07-18

## 2025-07-18 DIAGNOSIS — J43.8 OTHER EMPHYSEMA (HCC): ICD-10-CM

## 2025-07-18 DIAGNOSIS — N18.4 CKD (CHRONIC KIDNEY DISEASE) STAGE 4, GFR 15-29 ML/MIN (HCC): Primary | ICD-10-CM

## 2025-07-18 NOTE — TELEPHONE ENCOUNTER
Maday Wheeler is calling due to receiving 2 referrals for one for [facility] another for [facility] as well as they need more detail for the narrative for the diagnosis for care   CALL BACK [phone]

## 2025-07-18 NOTE — TELEPHONE ENCOUNTER
He is calling about the request from 07/16/25 to get a hospital bed for Ms Stephens.    She is moving to [facility] on 07/25/25.    Please call him with a status of the bed, as the patient fell last night and they need this asap.    He needs a referral to [facility] for skilled nursing until she is moved tp [facility].

## 2025-07-18 NOTE — TELEPHONE ENCOUNTER
Pt would like to cancel the order for the bed due to it taking to long.     Please call if need be

## 2025-07-18 NOTE — TELEPHONE ENCOUNTER
Orders have been placed however, the Hospital bed may take a significant amount of time to be process 3-12 months .

## 2025-07-18 NOTE — TELEPHONE ENCOUNTER
Patient is now renting a bed from [email or web address] since the other option would take to long.    Please send referral to:    [facility] [phone]     [phone]    Please call and advise when this is done.  Move in date is 07/25/25 or 07/28/25 at [facility].

## (undated) DEVICE — PACK,CYSTOSCOPY,PK III,AURORA: Brand: MEDLINE

## (undated) DEVICE — NON-STICK BIPOLAR COAGULATION FORCEPS 7 1/2" (19.1CM) GERALD BAYONET, INSULATED, 1MM TIP: Brand: KIRWAN

## (undated) DEVICE — CONTAINER,SPECIMEN,PNEU TUBE,3OZ,OR STRL: Brand: MEDLINE

## (undated) DEVICE — SYRINGE MED 10ML LUERLOCK TIP W/O SFTY DISP

## (undated) DEVICE — SINGLE ACTION PUMPING SYSTEM

## (undated) DEVICE — BIGOPSY BACKLOADING BIOPSY FORCEPS: Brand: BIGOPSY

## (undated) DEVICE — MEDICINE CUP, GRADUATED, STER: Brand: MEDLINE

## (undated) DEVICE — GUIDEWIRE ENDOSCP L150CM DIA0.035IN TIP 3CM PTFE NIT

## (undated) DEVICE — GARMENT,MEDLINE,DVT,INT,CALF,MED, GEN2: Brand: MEDLINE

## (undated) DEVICE — GAUZE,SPONGE,4"X4",16PLY,XRAY,STRL,LF: Brand: MEDLINE

## (undated) DEVICE — GLOVE ORANGE PI 7 1/2   MSG9075

## (undated) DEVICE — GUIDEWIRE URO L145CM DIA0.035IN TIP L7CM S STL PTFE BENT

## (undated) DEVICE — OPEN-END FLEXI-TIP URETERAL CATHETER: Brand: FLEXI-TIP

## (undated) DEVICE — TOWEL,STOP FLAG GOLD N-W: Brand: MEDLINE

## (undated) DEVICE — SEAL ENDOSCP INSTR BX PRT FOR ACC UPTO 3FR

## (undated) DEVICE — NCIRCLE, NITINOL TIPLESS STONE EXTRACTOR DELTA WIRE, BASKET: Brand: NCIRCLE

## (undated) DEVICE — DUAL LUMEN URETERAL CATHETER

## (undated) DEVICE — MEDIA CONTRAST INJ OPTIRAY 300 50 ML

## (undated) DEVICE — Z INACTIVE USE 2660664 SOLUTION IRRIG 3000ML 0.9% SOD CHL USP UROMATIC PLAS CONT

## (undated) DEVICE — PAD,NON-ADHERENT,3X8,STERILE,LF,1/PK: Brand: MEDLINE

## (undated) DEVICE — Z DISCONTINUED BY MEDLINE USE 2711682 TRAY SKIN PREP DRY W/ PREM GLV

## (undated) DEVICE — NITINOL STONE RETRIEVAL BASKET: Brand: ZERO TIP

## (undated) DEVICE — SOLUTION IV IRRIG WATER 1000ML POUR BRL 2F7114

## (undated) DEVICE — SOLUTION IRRIG 3000ML 0.9% SOD CHL USP UROMATIC PLAS CONT

## (undated) DEVICE — Device

## (undated) DEVICE — SET,IRRIGATION,CYSTO,Y-TYPE,90": Brand: MEDLINE

## (undated) DEVICE — CYSTO: Brand: MEDLINE INDUSTRIES, INC.

## (undated) DEVICE — SET IRRIG L94IN ID0.281IN W/ 4.5IN DST FLX CONN 2 LD ON OFF